# Patient Record
Sex: MALE | Race: WHITE | NOT HISPANIC OR LATINO | Employment: UNEMPLOYED | URBAN - METROPOLITAN AREA
[De-identification: names, ages, dates, MRNs, and addresses within clinical notes are randomized per-mention and may not be internally consistent; named-entity substitution may affect disease eponyms.]

---

## 2020-09-02 ENCOUNTER — HOSPITAL ENCOUNTER (EMERGENCY)
Facility: HOSPITAL | Age: 60
Discharge: HOME | End: 2020-09-02
Attending: FAMILY MEDICINE
Payer: MEDICAID

## 2020-09-02 VITALS
TEMPERATURE: 99 F | HEART RATE: 74 BPM | RESPIRATION RATE: 16 BRPM | HEIGHT: 68 IN | BODY MASS INDEX: 21.98 KG/M2 | DIASTOLIC BLOOD PRESSURE: 90 MMHG | OXYGEN SATURATION: 100 % | SYSTOLIC BLOOD PRESSURE: 166 MMHG | WEIGHT: 145 LBS

## 2020-09-02 DIAGNOSIS — K52.9 GASTROENTERITIS: Primary | ICD-10-CM

## 2020-09-02 LAB — SARS-COV-2 RDRP RESP QL NAA+PROBE: NEGATIVE

## 2020-09-02 PROCEDURE — 25000003 PHARM REV CODE 250: Performed by: FAMILY MEDICINE

## 2020-09-02 PROCEDURE — 96374 THER/PROPH/DIAG INJ IV PUSH: CPT

## 2020-09-02 PROCEDURE — U0002 COVID-19 LAB TEST NON-CDC: HCPCS

## 2020-09-02 PROCEDURE — 99284 EMERGENCY DEPT VISIT MOD MDM: CPT | Mod: 25

## 2020-09-02 PROCEDURE — 63600175 PHARM REV CODE 636 W HCPCS: Performed by: FAMILY MEDICINE

## 2020-09-02 RX ORDER — GLIPIZIDE 10 MG/1
10 TABLET ORAL
COMMUNITY

## 2020-09-02 RX ORDER — GABAPENTIN 300 MG/1
600 CAPSULE ORAL 3 TIMES DAILY
Status: DISCONTINUED | OUTPATIENT
Start: 2020-09-02 | End: 2020-09-02 | Stop reason: HOSPADM

## 2020-09-02 RX ORDER — ONDANSETRON 2 MG/ML
4 INJECTION INTRAMUSCULAR; INTRAVENOUS
Status: COMPLETED | OUTPATIENT
Start: 2020-09-02 | End: 2020-09-02

## 2020-09-02 RX ORDER — PROMETHAZINE HYDROCHLORIDE 25 MG/1
50 TABLET ORAL EVERY 6 HOURS PRN
Qty: 8 TABLET | Refills: 0 | Status: ON HOLD | OUTPATIENT
Start: 2020-09-02 | End: 2023-04-06 | Stop reason: HOSPADM

## 2020-09-02 RX ORDER — METFORMIN HYDROCHLORIDE 1000 MG/1
1000 TABLET ORAL 2 TIMES DAILY WITH MEALS
Status: ON HOLD | COMMUNITY
End: 2023-04-06 | Stop reason: SDUPTHER

## 2020-09-02 RX ORDER — GABAPENTIN 800 MG/1
800 TABLET ORAL 3 TIMES DAILY
Status: ON HOLD | COMMUNITY
End: 2023-04-06 | Stop reason: SDUPTHER

## 2020-09-02 RX ADMIN — ONDANSETRON HYDROCHLORIDE 4 MG: 2 SOLUTION INTRAMUSCULAR; INTRAVENOUS at 09:09

## 2020-09-02 RX ADMIN — SODIUM CHLORIDE 1000 ML: 9 INJECTION, SOLUTION INTRAVENOUS at 09:09

## 2020-09-04 NOTE — ED PROVIDER NOTES
Encounter Date: 9/2/2020       History     Chief Complaint   Patient presents with    Diarrhea     Onset of symptoms yesterday.    Abdominal Cramping     Complaint of upper abdominal cramping. States pain radiates throughout the left and right side. Describes pain as intermittent cramping sensation. Rates pain 4/10. Last oral intake yesterday 1500. LBM x 8 hours ago.    Chills    Nausea     Intermittent.    Emesis     Reports 0 episodes today; 3 episodes yesterday.     60-year-old male presents complaining upper abdominal cramping associated with nausea chills and some loose bowel movements no nicole diarrhea, patient has diabetes hypertension and neuropathy        Review of patient's allergies indicates:  No Known Allergies  Past Medical History:   Diagnosis Date    Diabetes mellitus     Hypertension     Neuropathy      History reviewed. No pertinent surgical history.  No family history on file.  Social History     Tobacco Use    Smoking status: Former Smoker    Smokeless tobacco: Current User   Substance Use Topics    Alcohol use: Not Currently    Drug use: Never     Review of Systems   Constitutional: Negative for fever.   HENT: Negative for sore throat.    Respiratory: Negative for shortness of breath.    Cardiovascular: Negative for chest pain.   Gastrointestinal: Positive for abdominal distention and abdominal pain. Negative for nausea.   Genitourinary: Negative for dysuria.   Musculoskeletal: Negative for back pain.   Skin: Negative for rash.   Neurological: Negative for weakness.   Hematological: Does not bruise/bleed easily.       Physical Exam     Initial Vitals [09/02/20 0814]   BP Pulse Resp Temp SpO2   (!) 166/90 74 16 98.5 °F (36.9 °C) 100 %      MAP       --         Physical Exam    Nursing note and vitals reviewed.  Constitutional: He appears well-developed and well-nourished. He is not diaphoretic. No distress.   HENT:   Head: Normocephalic and atraumatic.   Right Ear: External ear  normal.   Left Ear: External ear normal.   Nose: Nose normal.   Mouth/Throat: Oropharynx is clear and moist. No oropharyngeal exudate.   Eyes: EOM are normal.   Neck: Normal range of motion. Neck supple. No tracheal deviation present.   Cardiovascular: Normal rate and regular rhythm.   No murmur heard.  Pulmonary/Chest: Breath sounds normal. No stridor. No respiratory distress. He has no rales.   Abdominal: Soft. He exhibits no distension and no mass. There is no abdominal tenderness. There is no rebound.   Musculoskeletal: Normal range of motion. No edema.   Lymphadenopathy:     He has no cervical adenopathy.   Neurological: He is alert and oriented to person, place, and time. He has normal strength.   Skin: Skin is warm and dry. Capillary refill takes less than 2 seconds. No pallor.   Psychiatric: He has a normal mood and affect.         ED Course   Procedures  Labs Reviewed   SARS-COV-2 RNA AMPLIFICATION, QUAL          Imaging Results    None                                          Clinical Impression:       ICD-10-CM ICD-9-CM   1. Gastroenteritis  K52.9 558.9             ED Disposition Condition    Discharge Stable        ED Prescriptions     Medication Sig Dispense Start Date End Date Auth. Provider    promethazine (PHENERGAN) 25 MG tablet Take 2 tablets (50 mg total) by mouth every 6 (six) hours as needed for Nausea. 8 tablet 9/2/2020  Khanh Jorge MD        Follow-up Information    None                                    Khanh Jorge MD  09/04/20 7506

## 2023-03-31 ENCOUNTER — HOSPITAL ENCOUNTER (INPATIENT)
Facility: HOSPITAL | Age: 63
LOS: 5 days | Discharge: NURSING, REHAB OR LONG-TERM CARE | End: 2023-04-07
Attending: FAMILY MEDICINE | Admitting: HOSPITALIST
Payer: MEDICAID

## 2023-03-31 DIAGNOSIS — T14.90XA TRAUMA: ICD-10-CM

## 2023-03-31 DIAGNOSIS — I95.1 ORTHOSTATIC HYPOTENSION: ICD-10-CM

## 2023-03-31 DIAGNOSIS — R50.9 FEVER: ICD-10-CM

## 2023-03-31 DIAGNOSIS — L03.031 CELLULITIS AND ABSCESS OF TOE OF RIGHT FOOT: ICD-10-CM

## 2023-03-31 DIAGNOSIS — I21.4 NSTEMI (NON-ST ELEVATED MYOCARDIAL INFARCTION): ICD-10-CM

## 2023-03-31 DIAGNOSIS — L03.115 CELLULITIS OF RIGHT LOWER EXTREMITY: ICD-10-CM

## 2023-03-31 DIAGNOSIS — R09.02 HYPOXIA: ICD-10-CM

## 2023-03-31 DIAGNOSIS — L02.611 CELLULITIS AND ABSCESS OF TOE OF RIGHT FOOT: ICD-10-CM

## 2023-03-31 DIAGNOSIS — S99.191A OTHER PHYSEAL FRACTURE OF RIGHT METATARSAL, INITIAL ENCOUNTER FOR CLOSED FRACTURE: ICD-10-CM

## 2023-03-31 DIAGNOSIS — M86.371 CHRONIC MULTIFOCAL OSTEOMYELITIS OF RIGHT FOOT: Primary | ICD-10-CM

## 2023-03-31 DIAGNOSIS — S93.106A DISLOCATION OF PHALANX OF FOOT, UNSPECIFIED LATERALITY, INITIAL ENCOUNTER: ICD-10-CM

## 2023-03-31 DIAGNOSIS — N17.9 AKI (ACUTE KIDNEY INJURY): ICD-10-CM

## 2023-03-31 DIAGNOSIS — R06.02 SOB (SHORTNESS OF BREATH): ICD-10-CM

## 2023-03-31 DIAGNOSIS — S93.104A TOE DISLOCATION, RIGHT, INITIAL ENCOUNTER: ICD-10-CM

## 2023-03-31 DIAGNOSIS — E11.649 TYPE 2 DIABETES MELLITUS WITH HYPOGLYCEMIA WITHOUT COMA, WITHOUT LONG-TERM CURRENT USE OF INSULIN: ICD-10-CM

## 2023-03-31 DIAGNOSIS — R79.89 ELEVATED TROPONIN: ICD-10-CM

## 2023-03-31 LAB
ALBUMIN SERPL BCP-MCNC: 2.9 G/DL (ref 3.5–5.2)
ALP SERPL-CCNC: 63 U/L (ref 55–135)
ALT SERPL W/O P-5'-P-CCNC: 21 U/L (ref 10–44)
ANION GAP SERPL CALC-SCNC: 8 MMOL/L (ref 8–16)
AST SERPL-CCNC: 30 U/L (ref 10–40)
BASOPHILS # BLD AUTO: 0.03 K/UL (ref 0–0.2)
BASOPHILS NFR BLD: 0.3 % (ref 0–1.9)
BILIRUB SERPL-MCNC: 0.8 MG/DL (ref 0.1–1)
BNP SERPL-MCNC: 55 PG/ML (ref 0–99)
BUN SERPL-MCNC: 32 MG/DL (ref 8–23)
CALCIUM SERPL-MCNC: 9.2 MG/DL (ref 8.7–10.5)
CHLORIDE SERPL-SCNC: 101 MMOL/L (ref 95–110)
CK SERPL-CCNC: 184 U/L (ref 20–200)
CO2 SERPL-SCNC: 24 MMOL/L (ref 23–29)
CREAT SERPL-MCNC: 2.3 MG/DL (ref 0.5–1.4)
DIFFERENTIAL METHOD: ABNORMAL
EOSINOPHIL # BLD AUTO: 0 K/UL (ref 0–0.5)
EOSINOPHIL NFR BLD: 0.3 % (ref 0–8)
ERYTHROCYTE [DISTWIDTH] IN BLOOD BY AUTOMATED COUNT: 13.1 % (ref 11.5–14.5)
EST. GFR  (NO RACE VARIABLE): 30.9 ML/MIN/1.73 M^2
ESTIMATED AVG GLUCOSE: 140 MG/DL (ref 68–131)
GLUCOSE SERPL-MCNC: 151 MG/DL (ref 70–110)
HBA1C MFR BLD: 6.5 % (ref 4–5.6)
HCT VFR BLD AUTO: 31.5 % (ref 40–54)
HGB BLD-MCNC: 10.5 G/DL (ref 14–18)
IMM GRANULOCYTES # BLD AUTO: 0.04 K/UL (ref 0–0.04)
IMM GRANULOCYTES NFR BLD AUTO: 0.3 % (ref 0–0.5)
INFLUENZA A, MOLECULAR: NEGATIVE
INFLUENZA B, MOLECULAR: NEGATIVE
LACTATE SERPL-SCNC: 1.2 MMOL/L (ref 0.5–2.2)
LIPASE SERPL-CCNC: 26 U/L (ref 4–60)
LYMPHOCYTES # BLD AUTO: 0.8 K/UL (ref 1–4.8)
LYMPHOCYTES NFR BLD: 6.8 % (ref 18–48)
MCH RBC QN AUTO: 29.3 PG (ref 27–31)
MCHC RBC AUTO-ENTMCNC: 33.3 G/DL (ref 32–36)
MCV RBC AUTO: 88 FL (ref 82–98)
MONOCYTES # BLD AUTO: 1.1 K/UL (ref 0.3–1)
MONOCYTES NFR BLD: 9.3 % (ref 4–15)
NEUTROPHILS # BLD AUTO: 9.9 K/UL (ref 1.8–7.7)
NEUTROPHILS NFR BLD: 83 % (ref 38–73)
NRBC BLD-RTO: 0 /100 WBC
PLATELET # BLD AUTO: 266 K/UL (ref 150–450)
PMV BLD AUTO: 10.7 FL (ref 9.2–12.9)
POCT GLUCOSE: 189 MG/DL (ref 70–110)
POTASSIUM SERPL-SCNC: 4.4 MMOL/L (ref 3.5–5.1)
PROT SERPL-MCNC: 7.3 G/DL (ref 6–8.4)
RBC # BLD AUTO: 3.58 M/UL (ref 4.6–6.2)
SARS-COV-2 RDRP RESP QL NAA+PROBE: NEGATIVE
SODIUM SERPL-SCNC: 133 MMOL/L (ref 136–145)
SPECIMEN SOURCE: NORMAL
TROPONIN I SERPL DL<=0.01 NG/ML-MCNC: 0.12 NG/ML (ref 0–0.03)
TROPONIN I SERPL DL<=0.01 NG/ML-MCNC: 0.12 NG/ML (ref 0–0.03)
WBC # BLD AUTO: 11.89 K/UL (ref 3.9–12.7)

## 2023-03-31 PROCEDURE — 70450 CT HEAD/BRAIN W/O DYE: CPT | Mod: TC

## 2023-03-31 PROCEDURE — 87502 INFLUENZA DNA AMP PROBE: CPT | Performed by: FAMILY MEDICINE

## 2023-03-31 PROCEDURE — 82550 ASSAY OF CK (CPK): CPT | Performed by: FAMILY MEDICINE

## 2023-03-31 PROCEDURE — 84484 ASSAY OF TROPONIN QUANT: CPT | Performed by: FAMILY MEDICINE

## 2023-03-31 PROCEDURE — 73630 X-RAY EXAM OF FOOT: CPT | Mod: TC,RT

## 2023-03-31 PROCEDURE — G0378 HOSPITAL OBSERVATION PER HR: HCPCS

## 2023-03-31 PROCEDURE — U0002 COVID-19 LAB TEST NON-CDC: HCPCS | Performed by: FAMILY MEDICINE

## 2023-03-31 PROCEDURE — 25000003 PHARM REV CODE 250: Performed by: FAMILY MEDICINE

## 2023-03-31 PROCEDURE — 83605 ASSAY OF LACTIC ACID: CPT | Performed by: FAMILY MEDICINE

## 2023-03-31 PROCEDURE — 85025 COMPLETE CBC W/AUTO DIFF WBC: CPT | Performed by: FAMILY MEDICINE

## 2023-03-31 PROCEDURE — 96360 HYDRATION IV INFUSION INIT: CPT

## 2023-03-31 PROCEDURE — 73630 XR FOOT COMPLETE 3 VIEW RIGHT: ICD-10-PCS | Mod: 26,RT,, | Performed by: RADIOLOGY

## 2023-03-31 PROCEDURE — 71045 X-RAY EXAM CHEST 1 VIEW: CPT | Mod: TC

## 2023-03-31 PROCEDURE — 93010 EKG 12-LEAD: ICD-10-PCS | Mod: ,,, | Performed by: INTERNAL MEDICINE

## 2023-03-31 PROCEDURE — 93005 ELECTROCARDIOGRAM TRACING: CPT

## 2023-03-31 PROCEDURE — 83880 ASSAY OF NATRIURETIC PEPTIDE: CPT | Performed by: FAMILY MEDICINE

## 2023-03-31 PROCEDURE — 70450 CT HEAD WITHOUT CONTRAST: ICD-10-PCS | Mod: 26,,, | Performed by: RADIOLOGY

## 2023-03-31 PROCEDURE — 86803 HEPATITIS C AB TEST: CPT | Performed by: FAMILY MEDICINE

## 2023-03-31 PROCEDURE — 87389 HIV-1 AG W/HIV-1&-2 AB AG IA: CPT | Performed by: FAMILY MEDICINE

## 2023-03-31 PROCEDURE — 70450 CT HEAD/BRAIN W/O DYE: CPT | Mod: 26,,, | Performed by: RADIOLOGY

## 2023-03-31 PROCEDURE — 71045 X-RAY EXAM CHEST 1 VIEW: CPT | Mod: 26,,, | Performed by: RADIOLOGY

## 2023-03-31 PROCEDURE — 83036 HEMOGLOBIN GLYCOSYLATED A1C: CPT | Performed by: FAMILY MEDICINE

## 2023-03-31 PROCEDURE — 81000 URINALYSIS NONAUTO W/SCOPE: CPT | Performed by: FAMILY MEDICINE

## 2023-03-31 PROCEDURE — 83690 ASSAY OF LIPASE: CPT | Performed by: FAMILY MEDICINE

## 2023-03-31 PROCEDURE — 87040 BLOOD CULTURE FOR BACTERIA: CPT | Performed by: FAMILY MEDICINE

## 2023-03-31 PROCEDURE — 99285 EMERGENCY DEPT VISIT HI MDM: CPT | Mod: 25

## 2023-03-31 PROCEDURE — 80053 COMPREHEN METABOLIC PANEL: CPT | Performed by: FAMILY MEDICINE

## 2023-03-31 PROCEDURE — 82962 GLUCOSE BLOOD TEST: CPT

## 2023-03-31 PROCEDURE — 71045 XR CHEST AP PORTABLE: ICD-10-PCS | Mod: 26,,, | Performed by: RADIOLOGY

## 2023-03-31 PROCEDURE — 73630 X-RAY EXAM OF FOOT: CPT | Mod: 26,RT,, | Performed by: RADIOLOGY

## 2023-03-31 PROCEDURE — 93010 ELECTROCARDIOGRAM REPORT: CPT | Mod: ,,, | Performed by: INTERNAL MEDICINE

## 2023-03-31 PROCEDURE — 84484 ASSAY OF TROPONIN QUANT: CPT | Mod: 91 | Performed by: FAMILY MEDICINE

## 2023-03-31 RX ORDER — SODIUM CHLORIDE 9 MG/ML
INJECTION, SOLUTION INTRAVENOUS CONTINUOUS
Status: DISCONTINUED | OUTPATIENT
Start: 2023-03-31 | End: 2023-04-01

## 2023-03-31 RX ORDER — ASPIRIN 325 MG
325 TABLET ORAL
Status: COMPLETED | OUTPATIENT
Start: 2023-03-31 | End: 2023-03-31

## 2023-03-31 RX ORDER — ACETAMINOPHEN 325 MG/1
650 TABLET ORAL
Status: COMPLETED | OUTPATIENT
Start: 2023-03-31 | End: 2023-03-31

## 2023-03-31 RX ADMIN — SODIUM CHLORIDE 1000 ML: 9 INJECTION, SOLUTION INTRAVENOUS at 10:03

## 2023-03-31 RX ADMIN — ACETAMINOPHEN 650 MG: 325 TABLET ORAL at 08:03

## 2023-03-31 RX ADMIN — ASPIRIN 325 MG ORAL TABLET 325 MG: 325 PILL ORAL at 08:03

## 2023-03-31 RX ADMIN — SODIUM CHLORIDE: 9 INJECTION, SOLUTION INTRAVENOUS at 11:03

## 2023-03-31 RX ADMIN — SODIUM CHLORIDE 1000 ML: 9 INJECTION, SOLUTION INTRAVENOUS at 07:03

## 2023-03-31 NOTE — Clinical Note
Diagnosis: SOB (shortness of breath) [[id number]]   Future Attending Provider: LADARIUS GONZALEZ [[id number]]   Admitting Provider:: CHAYITO RODRIGUEZ [[id number]]   Special Needs:: No Special Needs [1]

## 2023-03-31 NOTE — ED PROVIDER NOTES
"Encounter Date: 3/31/2023       History     Chief Complaint   Patient presents with    Dizziness     Pt reports dizziness with associated falls/balance issues for "weeks".      62-year-old male presents the ED ambulatory brought brought in by his brother states that he is had problems with balance dizziness and falls 4 weeks but today he is fallen 3 or 4 times denies any headache he denies any nicole vertigo any pain in the ears tinnitus no nausea vomiting or diarrhea he has a known history of neuropathy a partial amputation of the right foot apparently secondary to diabetic neuropathy and diabetic lesion of the toe    Review of patient's allergies indicates:  No Known Allergies  Past Medical History:   Diagnosis Date    Diabetes mellitus     Hypertension     Neuropathy      History reviewed. No pertinent surgical history.  History reviewed. No pertinent family history.  Social History     Tobacco Use    Smoking status: Former    Smokeless tobacco: Current   Substance Use Topics    Alcohol use: Not Currently    Drug use: Never     Review of Systems   Constitutional:  Positive for fatigue. Negative for fever.   HENT:  Negative for sore throat.    Respiratory:  Negative for shortness of breath.    Cardiovascular:  Negative for chest pain.   Gastrointestinal:  Negative for nausea.   Genitourinary:  Negative for dysuria.   Musculoskeletal:  Negative for back pain.   Skin:  Negative for rash.   Neurological:  Positive for weakness.   Hematological:  Does not bruise/bleed easily.   Psychiatric/Behavioral:  Negative for agitation.      Physical Exam     Initial Vitals [03/31/23 1648]   BP Pulse Resp Temp SpO2   116/69 96 20 (!) 101.9 °F (38.8 °C) 96 %      MAP       --         Physical Exam    Nursing note and vitals reviewed.  Constitutional: He appears well-developed and well-nourished. He is not diaphoretic. No distress.   HENT:   Head: Normocephalic and atraumatic.   Nose: Nose normal.   Mouth/Throat: Oropharynx is " clear and moist. No oropharyngeal exudate.   Eyes: EOM are normal.   Neck: Neck supple. No tracheal deviation present.   Normal range of motion.  Cardiovascular:  Normal rate and regular rhythm.           No murmur heard.  Pulmonary/Chest: Breath sounds normal. No stridor. No respiratory distress. He has no rales.   Abdominal: Abdomen is soft. He exhibits no distension and no mass. There is no abdominal tenderness. There is no rebound.   Musculoskeletal:         General: Edema present. Normal range of motion.      Cervical back: Normal range of motion and neck supple.      Comments: Patient has some edema to the right ankle which she states is chronic he also has a amputation to the right great toe has slight sensation decreased to the right foot compared to the left but has palpable dorsalis pedis pulses     Lymphadenopathy:     He has no cervical adenopathy.   Neurological: He is alert and oriented to person, place, and time. He has normal strength.   Skin: Skin is warm and dry. Capillary refill takes less than 2 seconds. No pallor.   Psychiatric: He has a normal mood and affect.   I have discussed case with incoming ED physician Dr. Mendoza   who will finish care and disposition of patient.     ED Course   Procedures  Labs Reviewed   CBC W/ AUTO DIFFERENTIAL - Abnormal; Notable for the following components:       Result Value    RBC 3.58 (*)     Hemoglobin 10.5 (*)     Hematocrit 31.5 (*)     Gran # (ANC) 9.9 (*)     Lymph # 0.8 (*)     Mono # 1.1 (*)     Gran % 83.0 (*)     Lymph % 6.8 (*)     All other components within normal limits   COMPREHENSIVE METABOLIC PANEL - Abnormal; Notable for the following components:    Sodium 133 (*)     Glucose 151 (*)     BUN 32 (*)     Creatinine 2.3 (*)     Albumin 2.9 (*)     eGFR 30.9 (*)     All other components within normal limits   HEMOGLOBIN A1C - Abnormal; Notable for the following components:    Hemoglobin A1C 6.5 (*)     Estimated Avg Glucose 140 (*)     All other  components within normal limits   TROPONIN I - Abnormal; Notable for the following components:    Troponin I 0.119 (*)     All other components within normal limits   TROPONIN I - Abnormal; Notable for the following components:    Troponin I 0.125 (*)     All other components within normal limits   POCT GLUCOSE - Abnormal; Notable for the following components:    POCT Glucose 189 (*)     All other components within normal limits   INFLUENZA A & B BY MOLECULAR   CULTURE, BLOOD   CULTURE, BLOOD   LIPASE   B-TYPE NATRIURETIC PEPTIDE   SARS-COV-2 RNA AMPLIFICATION, QUAL    Narrative:     Is the patient symptomatic?->No   LACTIC ACID, PLASMA   TROPONIN I   CK   HIV 1 / 2 ANTIBODY   HEPATITIS C ANTIBODY   URINALYSIS, REFLEX TO URINE CULTURE     EKG Readings: (Independently Interpreted)   Initial Reading: No STEMI. Rhythm: Normal Sinus Rhythm. Heart Rate: 90. Ectopy: No Ectopy. Conduction: RBBB. ST Segments: Normal ST Segments. T Waves: Normal.     Imaging Results              X-Ray Foot Complete Right (In process)                      X-Ray Chest AP Portable (In process)                      CT Head Without Contrast (In process)                      Medications   0.9%  NaCl infusion (has no administration in time range)   sodium chloride 0.9% bolus 1,000 mL 1,000 mL (1,000 mLs Intravenous New Bag 3/31/23 2219)   sodium chloride 0.9% bolus 1,000 mL 1,000 mL (0 mLs Intravenous Stopped 3/31/23 2020)   aspirin tablet 325 mg (325 mg Oral Given 3/31/23 2008)   acetaminophen tablet 650 mg (650 mg Oral Given 3/31/23 2015)     Medical Decision Making:   ED Management:  I assume care of the patient who reports recent falls over the last week and several falls today.  Patient states that he was more stumbling with lack of control of his right foot.  His right foot does have neuropathy and does have foot drop.  Patient has previous great toe amputation in his right foot.  On exam, right foot is mildly swollen with ecchymotic 2nd toe.   Patient has pain on standing on right foot despite neuropathy.  X-rays were obtained and showed impacted 3rd metatarsal as well dislocated 2nd toe at the MTP joint.  Patient was also noted to have a fever on initial arrival.  This started resolve spontaneously.  Patient is given Tylenol.  Lactic acid was within normal limits.  Blood cultures were obtained.  Patient was unable to urinate and we are awaiting urinalysis currently.  I have given patient IV fluids.  We will continue give further IV fluids.  Obtain urine sample.  Patient was noted to have orthostatic hypotension with drop in systolic blood pressure greater than 30 on 2 different occasions.  Second occasion was after IV fluid bolus.  Patient denies any chest pain, palpitations or current dizziness when standing.  Additionally, patient had mildly elevated troponin I.  Follow-up troponins showed elevation as well.  Patient does have noted renal insufficiency.  EKG shows no STEMI.  There is no ST depressions.  There is noted T-wave inversions in V1, V3 and V4.    Patient was given aspirin.  Discussed case with hospitalist.  Patient will be admitted for observation due to renal insufficiency, orthostasis, likely dehydration, will trend out his cardiac markers.  We will attempt to have orthopedics evaluate patient while he is being observed.  Patient may likely need echocardiogram.  We will defer orders to hospitalist.                          Clinical Impression:   Final diagnoses:  [R06.02] SOB (shortness of breath)  [R50.9] Fever  [T14.90XA] Trauma  [T14.90XA] Trauma - eval for osteo also  [S99.191A] Other physeal fracture of right metatarsal, initial encounter for closed fracture (Primary)  [S93.104A] Toe dislocation, right, initial encounter  [I95.1] Orthostatic hypotension  [R77.8] Elevated troponin        ED Disposition Condition    Observation Stable                Collin Mendoza MD  03/31/23 1845

## 2023-04-01 PROBLEM — R79.89 ELEVATED TROPONIN: Status: ACTIVE | Noted: 2023-04-01

## 2023-04-01 PROBLEM — S93.106A DISLOCATED TOE: Status: ACTIVE | Noted: 2023-04-01

## 2023-04-01 PROBLEM — R29.6 MULTIPLE FALLS: Status: ACTIVE | Noted: 2023-04-01

## 2023-04-01 PROBLEM — I21.4 NSTEMI (NON-ST ELEVATED MYOCARDIAL INFARCTION): Status: ACTIVE | Noted: 2023-04-01

## 2023-04-01 PROBLEM — N17.9 AKI (ACUTE KIDNEY INJURY): Status: ACTIVE | Noted: 2023-04-01

## 2023-04-01 PROBLEM — I95.1 ORTHOSTATIC HYPOTENSION: Status: ACTIVE | Noted: 2023-04-01

## 2023-04-01 PROBLEM — L03.115 CELLULITIS OF RIGHT LOWER EXTREMITY: Status: ACTIVE | Noted: 2023-04-01

## 2023-04-01 PROBLEM — E11.649 TYPE 2 DIABETES MELLITUS WITH HYPOGLYCEMIA WITHOUT COMA, WITHOUT LONG-TERM CURRENT USE OF INSULIN: Status: ACTIVE | Noted: 2023-04-01

## 2023-04-01 PROBLEM — R50.9 FEVER: Status: ACTIVE | Noted: 2023-04-01

## 2023-04-01 LAB
AMORPH CRY URNS QL MICRO: ABNORMAL
ANION GAP SERPL CALC-SCNC: 10 MMOL/L (ref 8–16)
BACTERIA #/AREA URNS HPF: ABNORMAL /HPF
BASOPHILS # BLD AUTO: 0.03 K/UL (ref 0–0.2)
BASOPHILS NFR BLD: 0.3 % (ref 0–1.9)
BILIRUB UR QL STRIP: NEGATIVE
BUN SERPL-MCNC: 32 MG/DL (ref 8–23)
CALCIUM SERPL-MCNC: 8.1 MG/DL (ref 8.7–10.5)
CHLORIDE SERPL-SCNC: 109 MMOL/L (ref 95–110)
CK SERPL-CCNC: 128 U/L (ref 20–200)
CLARITY UR: CLEAR
CO2 SERPL-SCNC: 21 MMOL/L (ref 23–29)
COLOR UR: YELLOW
CREAT SERPL-MCNC: 2 MG/DL (ref 0.5–1.4)
CRP SERPL-MCNC: 198.6 MG/L (ref 0–8.2)
DIFFERENTIAL METHOD: ABNORMAL
EOSINOPHIL # BLD AUTO: 0.2 K/UL (ref 0–0.5)
EOSINOPHIL NFR BLD: 1.5 % (ref 0–8)
ERYTHROCYTE [DISTWIDTH] IN BLOOD BY AUTOMATED COUNT: 13.1 % (ref 11.5–14.5)
EST. GFR  (NO RACE VARIABLE): 36.6 ML/MIN/1.73 M^2
GLUCOSE SERPL-MCNC: 62 MG/DL (ref 70–110)
GLUCOSE UR QL STRIP: NEGATIVE
HCT VFR BLD AUTO: 26.8 % (ref 40–54)
HCV AB SERPL QL IA: NORMAL
HGB BLD-MCNC: 8.8 G/DL (ref 14–18)
HGB UR QL STRIP: NEGATIVE
HIV 1+2 AB+HIV1 P24 AG SERPL QL IA: NORMAL
HYALINE CASTS #/AREA URNS LPF: 2 /LPF
IMM GRANULOCYTES # BLD AUTO: 0.06 K/UL (ref 0–0.04)
IMM GRANULOCYTES NFR BLD AUTO: 0.5 % (ref 0–0.5)
KETONES UR QL STRIP: NEGATIVE
LEUKOCYTE ESTERASE UR QL STRIP: NEGATIVE
LYMPHOCYTES # BLD AUTO: 1.1 K/UL (ref 1–4.8)
LYMPHOCYTES NFR BLD: 9.6 % (ref 18–48)
MAGNESIUM SERPL-MCNC: 2 MG/DL (ref 1.6–2.6)
MCH RBC QN AUTO: 29.2 PG (ref 27–31)
MCHC RBC AUTO-ENTMCNC: 32.8 G/DL (ref 32–36)
MCV RBC AUTO: 89 FL (ref 82–98)
MICROSCOPIC COMMENT: ABNORMAL
MONOCYTES # BLD AUTO: 1.3 K/UL (ref 0.3–1)
MONOCYTES NFR BLD: 10.7 % (ref 4–15)
NEUTROPHILS # BLD AUTO: 9 K/UL (ref 1.8–7.7)
NEUTROPHILS NFR BLD: 77.4 % (ref 38–73)
NITRITE UR QL STRIP: NEGATIVE
NRBC BLD-RTO: 0 /100 WBC
PH UR STRIP: 6 [PH] (ref 5–8)
PHOSPHATE SERPL-MCNC: 3.7 MG/DL (ref 2.7–4.5)
PLATELET # BLD AUTO: 237 K/UL (ref 150–450)
PMV BLD AUTO: 11.1 FL (ref 9.2–12.9)
POCT GLUCOSE: 111 MG/DL (ref 70–110)
POCT GLUCOSE: 124 MG/DL (ref 70–110)
POCT GLUCOSE: 186 MG/DL (ref 70–110)
POCT GLUCOSE: 65 MG/DL (ref 70–110)
POTASSIUM SERPL-SCNC: 4 MMOL/L (ref 3.5–5.1)
PROT UR QL STRIP: ABNORMAL
RBC # BLD AUTO: 3.01 M/UL (ref 4.6–6.2)
RBC #/AREA URNS HPF: 0 /HPF (ref 0–4)
SODIUM SERPL-SCNC: 140 MMOL/L (ref 136–145)
SP GR UR STRIP: 1.01 (ref 1–1.03)
TROPONIN I SERPL DL<=0.01 NG/ML-MCNC: 0.06 NG/ML (ref 0–0.03)
TROPONIN I SERPL DL<=0.01 NG/ML-MCNC: 0.07 NG/ML (ref 0–0.03)
TROPONIN I SERPL DL<=0.01 NG/ML-MCNC: 0.1 NG/ML (ref 0–0.03)
URN SPEC COLLECT METH UR: ABNORMAL
UROBILINOGEN UR STRIP-ACNC: 1 EU/DL
WBC # BLD AUTO: 11.65 K/UL (ref 3.9–12.7)
WBC #/AREA URNS HPF: 0 /HPF (ref 0–5)

## 2023-04-01 PROCEDURE — 96372 THER/PROPH/DIAG INJ SC/IM: CPT | Performed by: HOSPITALIST

## 2023-04-01 PROCEDURE — 83735 ASSAY OF MAGNESIUM: CPT | Performed by: HOSPITALIST

## 2023-04-01 PROCEDURE — 85025 COMPLETE CBC W/AUTO DIFF WBC: CPT | Performed by: HOSPITALIST

## 2023-04-01 PROCEDURE — 99499 UNLISTED E&M SERVICE: CPT | Mod: ,,, | Performed by: STUDENT IN AN ORGANIZED HEALTH CARE EDUCATION/TRAINING PROGRAM

## 2023-04-01 PROCEDURE — 25000003 PHARM REV CODE 250: Performed by: FAMILY MEDICINE

## 2023-04-01 PROCEDURE — S0030 INJECTION, METRONIDAZOLE: HCPCS | Performed by: STUDENT IN AN ORGANIZED HEALTH CARE EDUCATION/TRAINING PROGRAM

## 2023-04-01 PROCEDURE — 99223 PR INITIAL HOSPITAL CARE,LEVL III: ICD-10-PCS | Mod: GT,,, | Performed by: HOSPITALIST

## 2023-04-01 PROCEDURE — 99223 1ST HOSP IP/OBS HIGH 75: CPT | Mod: GT,,, | Performed by: HOSPITALIST

## 2023-04-01 PROCEDURE — 25000003 PHARM REV CODE 250

## 2023-04-01 PROCEDURE — G0378 HOSPITAL OBSERVATION PER HR: HCPCS

## 2023-04-01 PROCEDURE — 82550 ASSAY OF CK (CPK): CPT | Performed by: HOSPITALIST

## 2023-04-01 PROCEDURE — 63600175 PHARM REV CODE 636 W HCPCS: Performed by: STUDENT IN AN ORGANIZED HEALTH CARE EDUCATION/TRAINING PROGRAM

## 2023-04-01 PROCEDURE — 86140 C-REACTIVE PROTEIN: CPT | Performed by: HOSPITALIST

## 2023-04-01 PROCEDURE — 25000003 PHARM REV CODE 250: Performed by: STUDENT IN AN ORGANIZED HEALTH CARE EDUCATION/TRAINING PROGRAM

## 2023-04-01 PROCEDURE — 63600175 PHARM REV CODE 636 W HCPCS: Performed by: HOSPITALIST

## 2023-04-01 PROCEDURE — 84100 ASSAY OF PHOSPHORUS: CPT | Performed by: HOSPITALIST

## 2023-04-01 PROCEDURE — 25000003 PHARM REV CODE 250: Performed by: HOSPITALIST

## 2023-04-01 PROCEDURE — 80048 BASIC METABOLIC PNL TOTAL CA: CPT | Performed by: HOSPITALIST

## 2023-04-01 PROCEDURE — 84484 ASSAY OF TROPONIN QUANT: CPT | Performed by: HOSPITALIST

## 2023-04-01 PROCEDURE — 99499 NO LOS: ICD-10-PCS | Mod: ,,, | Performed by: STUDENT IN AN ORGANIZED HEALTH CARE EDUCATION/TRAINING PROGRAM

## 2023-04-01 RX ORDER — GABAPENTIN 100 MG/1
CAPSULE ORAL
Status: COMPLETED
Start: 2023-04-01 | End: 2023-04-01

## 2023-04-01 RX ORDER — GABAPENTIN 300 MG/1
CAPSULE ORAL
Status: DISPENSED
Start: 2023-04-01 | End: 2023-04-01

## 2023-04-01 RX ORDER — METRONIDAZOLE 500 MG/100ML
500 INJECTION, SOLUTION INTRAVENOUS
Status: DISCONTINUED | OUTPATIENT
Start: 2023-04-01 | End: 2023-04-02

## 2023-04-01 RX ORDER — CLINDAMYCIN PHOSPHATE 900 MG/50ML
900 INJECTION, SOLUTION INTRAVENOUS
Status: DISCONTINUED | OUTPATIENT
Start: 2023-04-01 | End: 2023-04-01

## 2023-04-01 RX ORDER — SODIUM CHLORIDE 9 MG/ML
INJECTION, SOLUTION INTRAVENOUS CONTINUOUS
Status: ACTIVE | OUTPATIENT
Start: 2023-04-01 | End: 2023-04-02

## 2023-04-01 RX ORDER — DEXTROSE 40 %
30 GEL (GRAM) ORAL
Status: DISCONTINUED | OUTPATIENT
Start: 2023-04-01 | End: 2023-04-07 | Stop reason: HOSPADM

## 2023-04-01 RX ORDER — SODIUM CHLORIDE 0.9 % (FLUSH) 0.9 %
10 SYRINGE (ML) INJECTION
Status: DISCONTINUED | OUTPATIENT
Start: 2023-04-01 | End: 2023-04-07 | Stop reason: HOSPADM

## 2023-04-01 RX ORDER — INSULIN ASPART 100 [IU]/ML
0-5 INJECTION, SOLUTION INTRAVENOUS; SUBCUTANEOUS
Status: DISCONTINUED | OUTPATIENT
Start: 2023-04-01 | End: 2023-04-07 | Stop reason: HOSPADM

## 2023-04-01 RX ORDER — NALOXONE HCL 0.4 MG/ML
0.02 VIAL (ML) INJECTION
Status: DISCONTINUED | OUTPATIENT
Start: 2023-04-01 | End: 2023-04-07 | Stop reason: HOSPADM

## 2023-04-01 RX ORDER — GABAPENTIN 100 MG/1
400 CAPSULE ORAL 3 TIMES DAILY
Status: DISCONTINUED | OUTPATIENT
Start: 2023-04-01 | End: 2023-04-01

## 2023-04-01 RX ORDER — HEPARIN SODIUM 5000 [USP'U]/ML
5000 INJECTION, SOLUTION INTRAVENOUS; SUBCUTANEOUS EVERY 8 HOURS
Status: DISCONTINUED | OUTPATIENT
Start: 2023-04-01 | End: 2023-04-02

## 2023-04-01 RX ORDER — GLUCAGON 1 MG
1 KIT INJECTION
Status: DISCONTINUED | OUTPATIENT
Start: 2023-04-01 | End: 2023-04-01

## 2023-04-01 RX ORDER — CLINDAMYCIN PHOSPHATE 900 MG/50ML
900 INJECTION, SOLUTION INTRAVENOUS
Status: DISCONTINUED | OUTPATIENT
Start: 2023-04-01 | End: 2023-04-02

## 2023-04-01 RX ORDER — DEXTROSE 40 %
15 GEL (GRAM) ORAL
Status: DISCONTINUED | OUTPATIENT
Start: 2023-04-01 | End: 2023-04-07 | Stop reason: HOSPADM

## 2023-04-01 RX ADMIN — GABAPENTIN 400 MG: 100 CAPSULE ORAL at 08:04

## 2023-04-01 RX ADMIN — VANCOMYCIN HYDROCHLORIDE 750 MG: 750 INJECTION, POWDER, LYOPHILIZED, FOR SOLUTION INTRAVENOUS at 05:04

## 2023-04-01 RX ADMIN — GABAPENTIN 400 MG: 100 CAPSULE ORAL at 03:04

## 2023-04-01 RX ADMIN — SODIUM CHLORIDE: 9 INJECTION, SOLUTION INTRAVENOUS at 06:04

## 2023-04-01 RX ADMIN — GABAPENTIN 400 MG: 100 CAPSULE ORAL at 02:04

## 2023-04-01 RX ADMIN — HEPARIN SODIUM 5000 UNITS: 5000 INJECTION, SOLUTION INTRAVENOUS; SUBCUTANEOUS at 02:04

## 2023-04-01 RX ADMIN — HEPARIN SODIUM 5000 UNITS: 5000 INJECTION, SOLUTION INTRAVENOUS; SUBCUTANEOUS at 05:04

## 2023-04-01 RX ADMIN — CEFEPIME 2 G: 2 INJECTION, POWDER, FOR SOLUTION INTRAMUSCULAR; INTRAVENOUS at 09:04

## 2023-04-01 RX ADMIN — SODIUM CHLORIDE: 9 INJECTION, SOLUTION INTRAVENOUS at 05:04

## 2023-04-01 RX ADMIN — GABAPENTIN 400 MG: 100 CAPSULE ORAL at 07:04

## 2023-04-01 RX ADMIN — METRONIDAZOLE 500 MG: 5 INJECTION, SOLUTION INTRAVENOUS at 06:04

## 2023-04-01 RX ADMIN — CLINDAMYCIN IN 5 PERCENT DEXTROSE 900 MG: 18 INJECTION, SOLUTION INTRAVENOUS at 07:04

## 2023-04-01 RX ADMIN — SODIUM CHLORIDE: 9 INJECTION, SOLUTION INTRAVENOUS at 12:04

## 2023-04-01 NOTE — HOSPITAL COURSE
Admitted to hospital for orthostatic hypotension, elevated troponin, and multiple falls several times a day as well.  He had a mildly elvated troponin which down trended, TTE ordered and pending. On further questioning, patient is adamant he is falling because he is unstable on his feet, is not passing out, and denies chest pain.  He has history of diabetic foot infection s/p amputation of right great toe with diabetic neuropathy.  His 2nd toe found to be ecchymotic, swollen, warm to touch but not painful.  Had fever noted in ED visit, BCx drawn, lactate wnl, antibiotics deferred on admission. Xrays showed 2nd digit dislocation as well as soft tissue swelling and questionable gas concerning for possible cellulitis or early necrotizing fasciitis.  Orthopedic surgery consulted in am, recommended IV abx, MRI, and vascular surgery evaluation for possible deep space infection and surgical debridement/amputation.  Obtained inflammatory markers, which were significantly elevated.  Started on broad spectrum antibiotics, CT foot ordered to evaluate deep space infection, which showed abscess and possible early osteomyelitis, but no evidence of gas gangrene or necrotizing infection.  Continued to spike fevers while on antibiotics. Podiatry consulted for surgical evaluation and infection source control, and plan to taken to OR for transmetatarsal amputation of right foot. Cultures taken. Eventually grew staph sensitive to moxifloxacin. Podiatry recommended patient being sent out on this antibiotic for 4 weeks. Patient with evidence of volume overload with acute on chronic HFpEF exacerbation causing acute hypoxic respiratory failure. This has resolved with IV lasix with cardiology guidance. Blood pressure medications have been uptitrated while patient has been admitted. Will require further uptitration in outpatient setting. Patient is cleared medically for discharge. Needs repeat BMP, Mg in 1 week to assess electrolytes and  renal function. Results should be faxed to PCP and to cardiologist if able.

## 2023-04-01 NOTE — HPI
The patient is a 63 y/o male with PMH of T2DM, neuropathy, and HTN who presented with dizziness and falls. He states falling about 8 times today. He reports dizziness and balance issues over the past 4 weeks. On presentation to the ER he was found to be orthostatic. Patient also found to have 3rd metatarsal fracture and dislocation of the 2nd toe. The second toe also appears to be ecchymotic and dusky. He reports that the toe had been like this for several months. He also had a temp of 101 in the ER.

## 2023-04-01 NOTE — NURSING
Report received.  Assessment done.  VSS.  Awake and oriented.  Respirations even and unlabored.  No complaints at this time.  A.M.  assessment unchanged.

## 2023-04-01 NOTE — PROGRESS NOTES
[facility] Medicine  Progress Note    Patient Name: Juan Pablo Chang  MRN: [id number]  Patient Class: OP- Observation   Admission Date: 3/31/2023  Length of Stay: 0 days  Attending Physician: Lary Cabrera MD  Primary Care Provider: EDNA Butler        Subjective:     Principal Problem:Cellulitis of right lower extremity        HPI:  The patient is a 61 y/o male with PMH of T2DM, neuropathy, and HTN who presented with dizziness and falls. He states falling about 8 times today. He reports dizziness and balance issues over the past 4 weeks. On presentation to the ER he was found to be orthostatic. Patient also found to have 3rd metatarsal fracture and dislocation of the 2nd toe. The second toe also appears to be ecchymotic and dusky. He reports that the toe had been like this for several months. He also had a temp of 101 in the ER.       Overview/Hospital Course:  Admitted to hospital for orthostatic hypotension, elevated troponin, and multiple falls several times a day as well.  He had a mildly elvated troponin which down trended, TTE ordered and pending. On further questioning, patient is adamant he is falling because he is unstable on his feet, is not passing out, and denies chest pain.  He has history of diabetic foot infection s/p amputation of right great toe with diabetic neuropathy.  His 2nd toe found to be ecchymotic, swollen, warm to touch but not painful.  Had fever noted in ED visit, BCx drawn, lactate wnl, but antibiotics deferred. Xrays showed 2nd digit dislocation as well as soft tissue swelling and questionable gas concerning for possible cellulitis or early necrotizing fasciitis.  Orthopedic surgery consulted in am, recommended IV abx, MRI, and vascular surgery evaluation for possible deep space infection and surgical debridement/amputation.  Obtained inflammatory markers, which were significantly elevated.  Started on broad spectrum antibiotics, CT and MRI foot ordered  to evaluate deep space infection, US arterial ordered for vascular status but has easily palpable pulses.  Podiatry consulted for surgical evaluation.      Interval History: This morning denies chest pain, worsening foot pain or swelling.  Actually asking if he can be discharged. BP normal, no subjective ortho stasis this morning.  Episode of asymptomatic hypoglycemia this am that corrected with oral sugar. Did spike another fever this afternoon.    Review of Systems  Objective:     Vital Signs (Most Recent):  Temp: (!) 100.6 °F (38.1 °C) (04/01/23 1645)  Pulse: 85 (04/01/23 1645)  Resp: 18 (04/01/23 1645)  BP: (!) 151/67 (04/01/23 1645)  SpO2: 96 % (04/01/23 1645)   Vital Signs (24h Range):  Temp:  [96.9 °F (36.1 °C)-100.6 °F (38.1 °C)] 100.6 °F (38.1 °C)  Pulse:  [64-90] 85  Resp:  [12-20] 18  SpO2:  [94 %-99 %] 96 %  BP: ()/(44-89) 151/67     Weight: 69 kg (152 lb 1.9 oz)  Body mass index is 23.13 kg/m².    Intake/Output Summary (Last 24 hours) at 4/1/2023 1716  Last data filed at 4/1/2023 1157  Gross per 24 hour   Intake 60 ml   Output 1250 ml   Net -1190 ml      Physical Exam  Vitals reviewed  General: NAD, Well developed, Well Nourished  Head: NC/AT  Eyes: EOMI, JAY  Cardiovascular: Pulses intact distally, Regular Rate and rhythm  Pulmonary: Normal Respiratory Rate, No respiratory distress  Gi: Soft, Non-tender  Extremities: Warm, surgical stump from RGT amputation well healed, right second toe ecchymotic, swollen, erythematous, no crepitus or bullae on palpation  Skin: Warm, dry  Neuro: Alert, Oriented x3, No focal Deficit  Psych: Appropriate mood and affect    Significant Labs: All pertinent labs within the past 24 hours have been reviewed.    Significant Imaging: I have reviewed all pertinent imaging results/findings within the past 24 hours.      Assessment/Plan:      * Cellulitis of right lower extremity  Time course of swollen foot/toe uncertain, it is not acutely worsening since admission   BCx  ordered and pending  Broad spectrum abx (vanc, cefepime, flagyl renally dosed)  Xrays with soft tissue swelling, questionable gas  CT, MRI ordered to evaluate deep space infection (nec fas, osteo)  Podiatry consults for surgical evaluation, will eval in am, appreciate assistance  LINREC score 6 pts, intermediate risk, added clinda pending CT      Multiple falls  Orthostasis improved.   Suspect mechanical component due to neuropathy and RGT amputation   PT/OT eval on Monday       Type 2 diabetes mellitus with hypoglycemia without coma, without long-term current use of insulin  Patient's FSGs are controlled on current medication regimen.  Last A1c reviewed-   Lab Results   Component Value Date    HGBA1C 6.5 (H) 03/31/2023     Most recent fingerstick glucose reviewed-   Recent Labs   Lab 04/01/23  0730 04/01/23  1114 04/01/23  1623   POCTGLUCOSE 65* 124* 111*     Current correctional scale  Low  Maintain anti-hyperglycemic dose as follows-   Antihyperglycemics (From admission, onward)    Start     Stop Route Frequency Ordered    04/01/23 1214  insulin aspart U-100 pen 0-5 Units         -- SubQ Before meals & nightly PRN 04/01/23 1115        Hold Oral hypoglycemics while patient is in the hospital.    Episode of asymptomatic hypoglycemia this am that corrected with oral sugar    Dislocated toe  Orthopedics consult, appreciate recs  NWB RLE for now.       Orthostatic hypotension  Possibly in setting of sepsis/infection   Now improved after IVF  Hold lisinopril-HCTZ, restart as able with STERLING  Telemetry     Some T wave inversions noted on EKG  Troponin peaked, asymptomatic   Low suspicion for STEMI/NSTEMI  Given ASA  2D echo     STERLING (acute kidney injury)  Lab Results   Component Value Date    CREATININE 2.0 (H) 04/01/2023     Sr Cr elevated, unknown baseline (suspect some CKD), improved with IVF  Daily CMP  Avoid nephrotoxins.   Renally dose all medications   Monitor events that may lead to decreased renal perfusion  (hypovolemia, hypotension, sepsis).   Monitor urine output (goal 0.5 mL/kg/hr) to assure that no obstruction precipitates worsening in GFR.          Fever  See cellulitis management      Elevated troponin  See orthostatic hypotension      VTE Risk Mitigation (From admission, onward)         Ordered     heparin (porcine) injection 5,000 Units  Every 8 hours         04/01/23 0025                Discharge Planning   MARION:      Code Status: Full Code   Is the patient medically ready for discharge?:     Reason for patient still in hospital (select all that apply): Treatment, Imaging, Consult recommendations and PT / OT recommendations             Lary Cabrera MD  Department of Hospital Medicine   [facility]

## 2023-04-01 NOTE — CONSULTS
Past Medical History:   Diagnosis Date    Diabetes mellitus     Hypertension     Neuropathy        History reviewed. No pertinent surgical history.      Current Facility-Administered Medications:     0.9%  NaCl infusion, , Intravenous, Continuous, Collin Mendoza MD, Last Rate: 150 mL/hr at 04/01/23 0606, New Bag at 04/01/23 0606    dextrose 10% bolus 125 mL 125 mL, 12.5 g, Intravenous, PRN, Sindy Anderson MD    dextrose 10% bolus 250 mL 250 mL, 25 g, Intravenous, PRN, Sindy Anderson MD    dextrose 40 % gel 15,000 mg, 15 g, Oral, PRN, Sindy Anderson MD    dextrose 40 % gel 30,000 mg, 30 g, Oral, PRN, Sindy Anderson MD    gabapentin (NEURONTIN) 300 MG capsule, , , ,     gabapentin capsule 400 mg, 400 mg, Oral, TID, Sindy Anderson MD, 400 mg at 04/01/23 0857    glucagon (human recombinant) injection 1 mg, 1 mg, Intramuscular, PRN, Sindy Anderson MD    heparin (porcine) injection 5,000 Units, 5,000 Units, Subcutaneous, Q8H, Sindy Anderson MD, 5,000 Units at 04/01/23 0556    naloxone 0.4 mg/mL injection 0.02 mg, 0.02 mg, Intravenous, PRN, Sindy Anderson MD    sodium chloride 0.9% flush 10 mL, 10 mL, Intravenous, PRN, Sindy Anderson MD    Allergies as of 03/31/2023    (No Known Allergies)       History reviewed. No pertinent family history.    Social History     Socioeconomic History    Marital status: Single   Tobacco Use    Smoking status: Former    Smokeless tobacco: Current   Substance and Sexual Activity    Alcohol use: Not Currently    Drug use: Never         CC:  Chronic right foot pain    HPI:  62-year-old male with history of diabetic neuropathy status post amputation of his right great toe he states approximately 2 years ago performed in [place].  He states it is since that time he is had pain swelling in his right foot.  Her pain is not really so much an issue as he has very little sensation in his foot he states that he is takes care of his foot was daily bathing and lotions and creams.   He is had no drainage from his foot but he is had swelling and redness for many months.  There has been no trauma recently they can appreciate.    Review of Systems   Constitution: Negative for chills and fever.   HENT: Negative for headaches and blurry vision.   Cardiovascular: Negative for chest pain, irregular heartbeat, leg swelling and palpitations.   Respiratory: Negative for cough and shortness of breath.   Endocrine: Negative for polyuria.   Hematologic/Lymphatic: Negative for bleeding problem. Does not bruise/bleed easily.   Skin: Negative for poor wound healing and rash.   Musculoskeletal: Negative for joint pain. Negative for arthritis, joint swelling, muscle weakness and myalgias.   Gastrointestinal: Negative for abdominal pain, heartburn, melena, nausea and vomiting.   Genitourinary: Negative for bladder incontinence and dysuria.   Neurological: Negative for numbness.   Psychiatric/Behavioral: Negative for depression. The patient is not nervous/anxious.     PE:  Temp:  [96.9 °F (36.1 °C)-101.9 °F (38.8 °C)] 98 °F (36.7 °C)  Pulse:  [64-96] 83  Resp:  [12-20] 18  SpO2:  [94 %-99 %] 94 %  BP: ()/(44-89) 139/64    Constitutional:   Patient is alert  and oriented in no acute distress  HEENT:  normocephalic atraumatic; PERRL EOMI  Neck:  Supple without adenopathy  Cardiovascular:  Normal rate and rhythm  Pulmonary:  Normal respiratory effort normal chest wall expansion  Abdominal:  Nonprotuberant nondistended  Musculoskeletal:  Patient has obvious absence of his great toe.  There swelling and faint erythema of the 2nd toe callus on the plantar aspect of his foot overlying the 2nd MTP joint there is no fluctuance or drainage there is no lymphangitis or proximal adenopathy.  Neurological:  No focal defect; cranial nerves 2-12 grossly intact  Psychiatric/behavioral:  Mood and behavior normal    Xrays:  Radiographs consistent with absence of the great toe probable chronic dislocation of his 2nd MTP joint  chronic degenerative changes of the 3rd metatarsal mild degenerative changes generally within the foot.  No acute abnormalities noted.    Labs:    Recent Results (from the past 24 hour(s))   POCT glucose    Collection Time: 03/31/23  4:51 PM   Result Value Ref Range    POCT Glucose 189 (H) 70 - 110 mg/dL   Complete Blood Count (CBC)    Collection Time: 03/31/23  5:50 PM   Result Value Ref Range    WBC 11.89 3.90 - 12.70 K/uL    RBC 3.58 (L) 4.60 - 6.20 M/uL    Hemoglobin 10.5 (L) 14.0 - 18.0 g/dL    Hematocrit 31.5 (L) 40.0 - 54.0 %    MCV 88 82 - 98 fL    MCH 29.3 27.0 - 31.0 pg    MCHC 33.3 32.0 - 36.0 g/dL    RDW 13.1 11.5 - 14.5 %    Platelets 266 150 - 450 K/uL    MPV 10.7 9.2 - 12.9 fL    Immature Granulocytes 0.3 0.0 - 0.5 %    Gran # (ANC) 9.9 (H) 1.8 - 7.7 K/uL    Immature Grans (Abs) 0.04 0.00 - 0.04 K/uL    Lymph # 0.8 (L) 1.0 - 4.8 K/uL    Mono # 1.1 (H) 0.3 - 1.0 K/uL    Eos # 0.0 0.0 - 0.5 K/uL    Baso # 0.03 0.00 - 0.20 K/uL    nRBC 0 0 /100 WBC    Gran % 83.0 (H) 38.0 - 73.0 %    Lymph % 6.8 (L) 18.0 - 48.0 %    Mono % 9.3 4.0 - 15.0 %    Eosinophil % 0.3 0.0 - 8.0 %    Basophil % 0.3 0.0 - 1.9 %    Differential Method Automated    Comprehensive Metabolic Panel (CMP)    Collection Time: 03/31/23  5:50 PM   Result Value Ref Range    Sodium 133 (L) 136 - 145 mmol/L    Potassium 4.4 3.5 - 5.1 mmol/L    Chloride 101 95 - 110 mmol/L    CO2 24 23 - 29 mmol/L    Glucose 151 (H) 70 - 110 mg/dL    BUN 32 (H) 8 - 23 mg/dL    Creatinine 2.3 (H) 0.5 - 1.4 mg/dL    Calcium 9.2 8.7 - 10.5 mg/dL    Total Protein 7.3 6.0 - 8.4 g/dL    Albumin 2.9 (L) 3.5 - 5.2 g/dL    Total Bilirubin 0.8 0.1 - 1.0 mg/dL    Alkaline Phosphatase 63 55 - 135 U/L    AST 30 10 - 40 U/L    ALT 21 10 - 44 U/L    Anion Gap 8 8 - 16 mmol/L    eGFR 30.9 (A) >60 mL/min/1.73 m^2   Lipase    Collection Time: 03/31/23  5:50 PM   Result Value Ref Range    Lipase 26 4 - 60 U/L   Brain natriuretic peptide    Collection Time: 03/31/23  5:50 PM    Result Value Ref Range    BNP 55 0 - 99 pg/mL   Hemoglobin A1c    Collection Time: 03/31/23  5:50 PM   Result Value Ref Range    Hemoglobin A1C 6.5 (H) 4.0 - 5.6 %    Estimated Avg Glucose 140 (H) 68 - 131 mg/dL   Troponin I    Collection Time: 03/31/23  5:50 PM   Result Value Ref Range    Troponin I 0.119 (H) 0.000 - 0.026 ng/mL   Influenza A & B by Molecular    Collection Time: 03/31/23  7:21 PM    Specimen: Nasopharyngeal Swab   Result Value Ref Range    Influenza A, Molecular Negative Negative    Influenza B, Molecular Negative Negative    Flu A & B Source Nasal Swab    COVID-19 Rapid Screening    Collection Time: 03/31/23  7:21 PM   Result Value Ref Range    SARS-CoV-2 RNA, Amplification, Qual Negative Negative   Lactic acid, plasma    Collection Time: 03/31/23  7:25 PM   Result Value Ref Range    Lactate (Lactic Acid) 1.2 0.5 - 2.2 mmol/L   CPK    Collection Time: 03/31/23  8:09 PM   Result Value Ref Range     20 - 200 U/L   Troponin I    Collection Time: 03/31/23  8:09 PM   Result Value Ref Range    Troponin I 0.125 (H) 0.000 - 0.026 ng/mL   Urinalysis, Reflex to Urine Culture Urine, Clean Catch    Collection Time: 03/31/23 11:38 PM    Specimen: Urine, Clean Catch   Result Value Ref Range    Specimen UA Urine, Unspecified     Color, UA Yellow Yellow, Straw, Dalila    Appearance, UA Clear Clear    pH, UA 6.0 5.0 - 8.0    Specific Gravity, UA 1.010 1.005 - 1.030    Protein, UA 1+ (A) Negative    Glucose, UA Negative Negative    Ketones, UA Negative Negative    Bilirubin (UA) Negative Negative    Occult Blood UA Negative Negative    Nitrite, UA Negative Negative    Urobilinogen, UA 1.0 Negative EU/dL    Leukocytes, UA Negative Negative   Urinalysis Microscopic    Collection Time: 03/31/23 11:38 PM   Result Value Ref Range    RBC, UA 0 0 - 4 /hpf    WBC, UA 0 0 - 5 /hpf    Bacteria Occasional None-Occ /hpf    Hyaline Casts, UA 2 (A) 0-1/lpf /lpf    Amorphous, UA Few None-Moderate    Microscopic Comment  SEE COMMENT    Troponin I    Collection Time: 04/01/23  2:45 AM   Result Value Ref Range    Troponin I 0.096 (H) 0.000 - 0.026 ng/mL   CBC with Automated Differential    Collection Time: 04/01/23  4:35 AM   Result Value Ref Range    WBC 11.65 3.90 - 12.70 K/uL    RBC 3.01 (L) 4.60 - 6.20 M/uL    Hemoglobin 8.8 (L) 14.0 - 18.0 g/dL    Hematocrit 26.8 (L) 40.0 - 54.0 %    MCV 89 82 - 98 fL    MCH 29.2 27.0 - 31.0 pg    MCHC 32.8 32.0 - 36.0 g/dL    RDW 13.1 11.5 - 14.5 %    Platelets 237 150 - 450 K/uL    MPV 11.1 9.2 - 12.9 fL    Immature Granulocytes 0.5 0.0 - 0.5 %    Gran # (ANC) 9.0 (H) 1.8 - 7.7 K/uL    Immature Grans (Abs) 0.06 (H) 0.00 - 0.04 K/uL    Lymph # 1.1 1.0 - 4.8 K/uL    Mono # 1.3 (H) 0.3 - 1.0 K/uL    Eos # 0.2 0.0 - 0.5 K/uL    Baso # 0.03 0.00 - 0.20 K/uL    nRBC 0 0 /100 WBC    Gran % 77.4 (H) 38.0 - 73.0 %    Lymph % 9.6 (L) 18.0 - 48.0 %    Mono % 10.7 4.0 - 15.0 %    Eosinophil % 1.5 0.0 - 8.0 %    Basophil % 0.3 0.0 - 1.9 %    Differential Method Automated    Basic Metabolic Panel (BMP)    Collection Time: 04/01/23  4:35 AM   Result Value Ref Range    Sodium 140 136 - 145 mmol/L    Potassium 4.0 3.5 - 5.1 mmol/L    Chloride 109 95 - 110 mmol/L    CO2 21 (L) 23 - 29 mmol/L    Glucose 62 (L) 70 - 110 mg/dL    BUN 32 (H) 8 - 23 mg/dL    Creatinine 2.0 (H) 0.5 - 1.4 mg/dL    Calcium 8.1 (L) 8.7 - 10.5 mg/dL    Anion Gap 10 8 - 16 mmol/L    eGFR 36.6 (A) >60 mL/min/1.73 m^2   Magnesium    Collection Time: 04/01/23  4:35 AM   Result Value Ref Range    Magnesium 2.0 1.6 - 2.6 mg/dL   Phosphorus    Collection Time: 04/01/23  4:35 AM   Result Value Ref Range    Phosphorus 3.7 2.7 - 4.5 mg/dL   POCT glucose    Collection Time: 04/01/23  7:30 AM   Result Value Ref Range    POCT Glucose 65 (L) 70 - 110 mg/dL       A:  Chronic right foot pain/diabetic neuropathy      P:  I have discussed medical condition treatment options with him at length.  I have suggested IV antibiotics close observation elevation of  extremity and an MRI of his right foot.  He will likely need an evaluation by a foot and ankle vascular specialist for consideration for more definitive treatment of a likely chronic infection in his foot to determine whether suppressive antibiotics is reasonable for him whether this might require amputation to help determine the amputation level.

## 2023-04-01 NOTE — ASSESSMENT & PLAN NOTE
One time fever noted but unclear source  2nd R toe does not appear to have drainage  UA pending  Will defer antibiotic therapy at this time and monitor

## 2023-04-01 NOTE — NURSING
Dr. Donaldson consulted for possible amputation of second toe, right foot. He stated that he will come in the morning to see the patient.

## 2023-04-01 NOTE — ASSESSMENT & PLAN NOTE
Lab Results   Component Value Date    CREATININE 2.3 (H) 03/31/2023     Sr Cr elevated  Secondary to volume depletion  Continue IVF   Cont to monitor with daily labs   Avoid nephrotoxins.   Renally dose all medications   Monitor events that may lead to decreased renal perfusion (hypovolemia, hypotension, sepsis).   Monitor urine output (goal 0.5 mL/kg/hr) to assure that no obstruction precipitates worsening in GFR.

## 2023-04-01 NOTE — H&P
"[facility] Medicine  History & Physical    Patient Name: Juan Pablo Chang  MRN: [id number]  Admission Date: 3/31/2023  Attending Physician: Devin Baker MD   Primary Care Provider: Primary Doctor No         Patient information was obtained from patient and ER records.       Subjective:     Principal Problem:Orthostatic hypotension    Chief Complaint:   Chief Complaint   Patient presents with    Dizziness     Pt reports dizziness with associated falls/balance issues for "weeks".         HPI: The patient is a 63 y/o male with PMH of T2DM, neuropathy, and HTN who presented with dizziness and falls. He states falling about 8 times today. He reports dizziness and balance issues over the past 4 weeks. On presentation to the ER he was found to be orthostatic. Patient also found to have 3rd metatarsal fracture and dislocation of the 2nd toe. The second toe also appears to be ecchymotic and dusky. He reports that the toe had been like this for several months. He also had a temp of 101 in the ER.       Past Medical History:   Diagnosis Date    Diabetes mellitus     Hypertension     Neuropathy        History reviewed. No pertinent surgical history.    Review of patient's allergies indicates:  No Known Allergies    No current facility-administered medications on file prior to encounter.     Current Outpatient Medications on File Prior to Encounter   Medication Sig    gabapentin (NEURONTIN) 800 MG tablet Take 800 mg by mouth 3 (three) times daily.    glipiZIDE (GLUCOTROL) 10 MG tablet Take 10 mg by mouth 2 (two) times daily before meals.    INV LISINOPRIL-HCTZ 20-25 Take 1 tablet by mouth once daily. FOR INVESTIGATIONAL USE ONLY    metFORMIN (GLUCOPHAGE) 1000 MG tablet Take 1,000 mg by mouth 2 (two) times daily with meals.    promethazine (PHENERGAN) 25 MG tablet Take 2 tablets (50 mg total) by mouth every 6 (six) hours as needed for Nausea.     Family History    None       Tobacco Use    Smoking " status: Former    Smokeless tobacco: Current   Substance and Sexual Activity    Alcohol use: Not Currently    Drug use: Never    Sexual activity: Not on file     Review of Systems   Cardiovascular:  Negative for chest pain.   Neurological:  Positive for dizziness and syncope.   Objective:     Vital Signs (Most Recent):  Temp: 96.9 °F (36.1 °C) (03/31/23 2331)  Pulse: 64 (03/31/23 2331)  Resp: 14 (03/31/23 2331)  BP: (!) 110/56 (03/31/23 2331)  SpO2: 97 % (03/31/23 2331)   Vital Signs (24h Range):  Temp:  [96.9 °F (36.1 °C)-101.9 °F (38.8 °C)] 96.9 °F (36.1 °C)  Pulse:  [64-96] 64  Resp:  [12-20] 14  SpO2:  [95 %-99 %] 97 %  BP: ()/(44-89) 110/56     Weight: 69 kg (152 lb 1.9 oz)  Body mass index is 23.13 kg/m².    Physical Exam  Cardiovascular:      Rate and Rhythm: Normal rate.   Pulmonary:      Effort: Pulmonary effort is normal. No respiratory distress.   Musculoskeletal:      Comments: Amputation of R great toe  Ecchymotic appearance to 2nd R toe    Neurological:      Mental Status: He is alert.           Significant Labs: All pertinent labs within the past 24 hours have been reviewed.    Significant Imaging: I have reviewed all pertinent imaging results/findings within the past 24 hours.    Assessment/Plan:     * Orthostatic hypotension  Hold lisinopril-HCTZ  Continue IVF  Telemetry       STERLING (acute kidney injury)  Lab Results   Component Value Date    CREATININE 2.3 (H) 03/31/2023     Sr Cr elevated  Secondary to volume depletion  Continue IVF   Cont to monitor with daily labs   Avoid nephrotoxins.   Renally dose all medications   Monitor events that may lead to decreased renal perfusion (hypovolemia, hypotension, sepsis).   Monitor urine output (goal 0.5 mL/kg/hr) to assure that no obstruction precipitates worsening in GFR.          Fever  One time fever noted but unclear source  2nd R toe does not appear to have drainage  UA pending  Will defer antibiotic therapy at this time and monitor         Elevated troponin  Some T wave inversions noted on EKG  Trend troponin   Given ASA  No CP reported  2D echo     Dislocated toe     Orthopedics consult     VTE Risk Mitigation (From admission, onward)           Ordered     heparin (porcine) injection 5,000 Units  Every 8 hours         04/01/23 0025                         The attending portion of this evaluation, treatment, and documentation was performed per Sindy Anderson MD via Telemedicine AudioVisual using the secure [employer] software platform with 2 way audio/video. The provider was located off-site and the patient is located in the hospital. The aforementioned video software was utilized to document the relevant history and physical exam      Sindy Anderson MD  Department of Hospital Medicine   [facility] - Intensive Care

## 2023-04-01 NOTE — ASSESSMENT & PLAN NOTE
Lab Results   Component Value Date    CREATININE 2.0 (H) 04/01/2023     Sr Cr elevated, unknown baseline (suspect some CKD), improved with IVF  Daily CMP  Avoid nephrotoxins.   Renally dose all medications   Monitor events that may lead to decreased renal perfusion (hypovolemia, hypotension, sepsis).   Monitor urine output (goal 0.5 mL/kg/hr) to assure that no obstruction precipitates worsening in GFR.

## 2023-04-01 NOTE — SUBJECTIVE & OBJECTIVE
Interval History: This morning denies chest pain, worsening foot pain or swelling.  Actually asking if he can be discharged. BP normal, no subjective ortho stasis this morning.  Episode of asymptomatic hypoglycemia this am that corrected with oral sugar. Did spike another fever this afternoon.    Review of Systems  Objective:     Vital Signs (Most Recent):  Temp: (!) 100.6 °F (38.1 °C) (04/01/23 1645)  Pulse: 85 (04/01/23 1645)  Resp: 18 (04/01/23 1645)  BP: (!) 151/67 (04/01/23 1645)  SpO2: 96 % (04/01/23 1645)   Vital Signs (24h Range):  Temp:  [96.9 °F (36.1 °C)-100.6 °F (38.1 °C)] 100.6 °F (38.1 °C)  Pulse:  [64-90] 85  Resp:  [12-20] 18  SpO2:  [94 %-99 %] 96 %  BP: ()/(44-89) 151/67     Weight: 69 kg (152 lb 1.9 oz)  Body mass index is 23.13 kg/m².    Intake/Output Summary (Last 24 hours) at 4/1/2023 1716  Last data filed at 4/1/2023 1157  Gross per 24 hour   Intake 60 ml   Output 1250 ml   Net -1190 ml      Physical Exam  Vitals reviewed  General: NAD, Well developed, Well Nourished  Head: NC/AT  Eyes: EOMI, JAY  Cardiovascular: Pulses intact distally, Regular Rate and rhythm  Pulmonary: Normal Respiratory Rate, No respiratory distress  Gi: Soft, Non-tender  Extremities: Warm, surgical stump from RGT amputation well healed, right second toe ecchymotic, swollen, erythematous, no crepitus or bullae on palpation, easily palpable DP and PT pulses  Skin: Warm, dry  Neuro: Alert, Oriented x3, No focal Deficit  Psych: Appropriate mood and affect    Significant Labs: All pertinent labs within the past 24 hours have been reviewed.    Significant Imaging: I have reviewed all pertinent imaging results/findings within the past 24 hours.

## 2023-04-01 NOTE — ASSESSMENT & PLAN NOTE
Time course of swollen foot/toe uncertain, it is not acutely worsening since admission   BCx ordered and pending  Xrays with soft tissue swelling, questionable gas  Broad spectrum abx (vanc, cefepime, flagyl renally dosed)  CT, MRI ordered to evaluate deep space infection (nec fas, osteo)  Podiatry consulted for surgical evaluation, will eval in am, appreciate assistance  LINREC score 6 pts, intermediate risk, added clinda pending CT

## 2023-04-01 NOTE — PROGRESS NOTES
Pharmacokinetic Initial Assessment: IV Vancomycin    Assessment/Plan:    Initiate intravenous vancomycin with loading dose of 750  mg once followed by a maintenance dose of vancomycin 750mg IV every 24 hours  Desired empiric serum trough concentration is 10 to 15 mcg/mL  Draw vancomycin trough level 30 min prior to third dose on 04/03/2023 at approximately 1700  Pharmacy will continue to follow and monitor vancomycin.      Please contact pharmacy at extension [phone] with any questions regarding this assessment.     Thank you for the consult,   Erik Gurrola       Patient brief summary:  Juan Pablo Chang is a 62 y.o. male initiated on antimicrobial therapy with IV Vancomycin for treatment of suspected skin & soft tissue infection    Drug Allergies:   Review of patient's allergies indicates:  No Known Allergies    Actual Body Weight:   69 Kg    Renal Function:   Estimated Creatinine Clearance: 36.1 mL/min (A) (based on SCr of 2 mg/dL (H)).,     Dialysis Method (if applicable):  N/A    CBC (last 72 hours):  Recent Labs   Lab Result Units 03/31/23  1750 04/01/23  0435   WBC K/uL 11.89 11.65   Hemoglobin g/dL 10.5* 8.8*   Hemoglobin A1C % 6.5*  --    Hematocrit % 31.5* 26.8*   Platelets K/uL 266 237   Gran % % 83.0* 77.4*   Lymph % % 6.8* 9.6*   Mono % % 9.3 10.7   Eosinophil % % 0.3 1.5   Basophil % % 0.3 0.3   Differential Method  Automated Automated       Metabolic Panel (last 72 hours):  Recent Labs   Lab Result Units 03/31/23  1750 03/31/23  2338 04/01/23  0435   Sodium mmol/L 133*  --  140   Potassium mmol/L 4.4  --  4.0   Chloride mmol/L 101  --  109   CO2 mmol/L 24  --  21*   Glucose mg/dL 151*  --  62*   Glucose, UA   --  Negative  --    BUN mg/dL 32*  --  32*   Creatinine mg/dL 2.3*  --  2.0*   Albumin g/dL 2.9*  --   --    Total Bilirubin mg/dL 0.8  --   --    Alkaline Phosphatase U/L 63  --   --    AST U/L 30  --   --    ALT U/L 21  --   --    Magnesium mg/dL  --   --  2.0   Phosphorus mg/dL  --   --  3.7        Drug levels (last 3 results):  No results for input(s): VANCOMYCINRA, VANCORANDOM, VANCOMYCINPE, VANCOPEAK, VANCOMYCINTR, VANCOTROUGH in the last 72 hours.    Microbiologic Results:  Microbiology Results (last 7 days)       Procedure Component Value Units Date/Time    Blood Culture #2 **CANNOT BE ORDERED STAT** [[id number]] Collected: 03/31/23 1924    Order Status: Sent Specimen: Blood from Peripheral, Antecubital, Left Updated: 04/01/23 1543    Blood Culture #1 **CANNOT BE ORDERED STAT** [[id number]] Collected: 03/31/23 1925    Order Status: Sent Specimen: Blood from Peripheral, Antecubital, Right Updated: 04/01/23 1543    Influenza A & B by Molecular [[id number]] Collected: 03/31/23 1921    Order Status: Completed Specimen: Nasopharyngeal Swab Updated: 03/31/23 2015     Influenza A, Molecular Negative     Influenza B, Molecular Negative     Flu A & B Source Nasal Swab

## 2023-04-01 NOTE — NURSING
Per radiology, unable to obtain CT with contrast at this time.  GFR 36.6.  Dr. Cabrera notified.  Orders received.

## 2023-04-01 NOTE — SUBJECTIVE & OBJECTIVE
Past Medical History:   Diagnosis Date    Diabetes mellitus     Hypertension     Neuropathy        History reviewed. No pertinent surgical history.    Review of patient's allergies indicates:  No Known Allergies    No current facility-administered medications on file prior to encounter.     Current Outpatient Medications on File Prior to Encounter   Medication Sig    gabapentin (NEURONTIN) 800 MG tablet Take 800 mg by mouth 3 (three) times daily.    glipiZIDE (GLUCOTROL) 10 MG tablet Take 10 mg by mouth 2 (two) times daily before meals.    INV LISINOPRIL-HCTZ 20-25 Take 1 tablet by mouth once daily. FOR INVESTIGATIONAL USE ONLY    metFORMIN (GLUCOPHAGE) 1000 MG tablet Take 1,000 mg by mouth 2 (two) times daily with meals.    promethazine (PHENERGAN) 25 MG tablet Take 2 tablets (50 mg total) by mouth every 6 (six) hours as needed for Nausea.     Family History    None       Tobacco Use    Smoking status: Former    Smokeless tobacco: Current   Substance and Sexual Activity    Alcohol use: Not Currently    Drug use: Never    Sexual activity: Not on file     Review of Systems   Cardiovascular:  Negative for chest pain.   Neurological:  Positive for dizziness and syncope.   Objective:     Vital Signs (Most Recent):  Temp: 96.9 °F (36.1 °C) (03/31/23 2331)  Pulse: 64 (03/31/23 2331)  Resp: 14 (03/31/23 2331)  BP: (!) 110/56 (03/31/23 2331)  SpO2: 97 % (03/31/23 2331)   Vital Signs (24h Range):  Temp:  [96.9 °F (36.1 °C)-101.9 °F (38.8 °C)] 96.9 °F (36.1 °C)  Pulse:  [64-96] 64  Resp:  [12-20] 14  SpO2:  [95 %-99 %] 97 %  BP: ()/(44-89) 110/56     Weight: 69 kg (152 lb 1.9 oz)  Body mass index is 23.13 kg/m².    Physical Exam  Cardiovascular:      Rate and Rhythm: Normal rate.   Pulmonary:      Effort: Pulmonary effort is normal. No respiratory distress.   Musculoskeletal:      Comments: Amputation of R great toe  Ecchymotic appearance to 2nd R toe    Neurological:      Mental Status: He is alert.            Significant Labs: All pertinent labs within the past 24 hours have been reviewed.    Significant Imaging: I have reviewed all pertinent imaging results/findings within the past 24 hours.

## 2023-04-01 NOTE — PLAN OF CARE
Problem: Adult Inpatient Plan of Care  Goal: Plan of Care Review  Outcome: Ongoing, Progressing  Goal: Patient-Specific Goal (Individualized)  Outcome: Ongoing, Progressing  Goal: Absence of Hospital-Acquired Illness or Injury  Outcome: Ongoing, Progressing  Goal: Optimal Comfort and Wellbeing  Outcome: Ongoing, Progressing  Intervention: Monitor Pain and Promote Comfort  Flowsheets (Taken 4/1/2023 5671)  Pain Management Interventions: medication offered     Problem: Impaired Wound Healing  Goal: Optimal Wound Healing  Outcome: Ongoing, Progressing     Problem: Infection  Goal: Absence of Infection Signs and Symptoms  Outcome: Ongoing, Progressing     Problem: Fluid and Electrolyte Imbalance (Acute Kidney Injury/Impairment)  Goal: Fluid and Electrolyte Balance  Outcome: Ongoing, Progressing

## 2023-04-01 NOTE — ASSESSMENT & PLAN NOTE
Orthostasis improved.   Suspect mechanical component due to neuropathy and RGT amputation   PT/OT eval on Monday

## 2023-04-01 NOTE — ASSESSMENT & PLAN NOTE
Possibly in setting of sepsis/infection   Now improved after IVF  Hold lisinopril-HCTZ, restart as able with STERLING  Telemetry     Some T wave inversions noted on EKG  Troponin peaked, asymptomatic   Low suspicion for STEMI/NSTEMI  Given ASA  2D echo

## 2023-04-01 NOTE — PROGRESS NOTES
Pharmacist Renal Dose Adjustment Note    Juan Pablo Chang is a 62 y.o. male being treated with the medication gabapentin    Patient Data:    Vital Signs (Most Recent):  Temp: 96.9 °F (36.1 °C) (03/31/23 2331)  Pulse: 64 (03/31/23 2331)  Resp: 14 (03/31/23 2331)  BP: (!) 110/56 (03/31/23 2331)  SpO2: 97 % (03/31/23 2331)   Vital Signs (72h Range):  Temp:  [96.9 °F (36.1 °C)-101.9 °F (38.8 °C)]   Pulse:  [64-96]   Resp:  [12-20]   BP: ()/(44-89)   SpO2:  [95 %-99 %]      Recent Labs   Lab 03/31/23 1750   CREATININE 2.3*     Serum creatinine: 2.3 mg/dL (H) 03/31/23 1750  Estimated creatinine clearance: 31.4 mL/min (A)    Medication:gabapentin dose: 800mg frequency tid will be changed to medication:gabapentin dose:400mg frequency:tid    Pharmacist's Name: Hi Ayon

## 2023-04-01 NOTE — ASSESSMENT & PLAN NOTE
Patient's FSGs are controlled on current medication regimen.  Last A1c reviewed-   Lab Results   Component Value Date    HGBA1C 6.5 (H) 03/31/2023     Most recent fingerstick glucose reviewed-   Recent Labs   Lab 04/01/23  0730 04/01/23  1114 04/01/23  1623   POCTGLUCOSE 65* 124* 111*     Current correctional scale  Low  Maintain anti-hyperglycemic dose as follows-   Antihyperglycemics (From admission, onward)    Start     Stop Route Frequency Ordered    04/01/23 1214  insulin aspart U-100 pen 0-5 Units         -- SubQ Before meals & nightly PRN 04/01/23 1115        Hold Oral hypoglycemics while patient is in the hospital.    Episode of asymptomatic hypoglycemia this am that corrected with oral sugar

## 2023-04-02 ENCOUNTER — ANESTHESIA EVENT (OUTPATIENT)
Dept: SURGERY | Facility: HOSPITAL | Age: 63
End: 2023-04-02
Payer: MEDICAID

## 2023-04-02 ENCOUNTER — ANESTHESIA (OUTPATIENT)
Dept: SURGERY | Facility: HOSPITAL | Age: 63
End: 2023-04-02
Payer: MEDICAID

## 2023-04-02 PROBLEM — L03.115 CELLULITIS OF RIGHT LOWER EXTREMITY: Status: ACTIVE | Noted: 2023-04-02

## 2023-04-02 PROBLEM — L02.611 CELLULITIS AND ABSCESS OF TOE OF RIGHT FOOT: Status: ACTIVE | Noted: 2023-04-01

## 2023-04-02 PROBLEM — L03.031 CELLULITIS AND ABSCESS OF TOE OF RIGHT FOOT: Status: ACTIVE | Noted: 2023-04-01

## 2023-04-02 PROBLEM — J20.9 ACUTE BRONCHITIS: Status: ACTIVE | Noted: 2023-04-02

## 2023-04-02 PROBLEM — M71.071 ABSCESS OF BURSA OF RIGHT FOOT: Status: ACTIVE | Noted: 2023-04-02

## 2023-04-02 PROBLEM — M86.371 CHRONIC MULTIFOCAL OSTEOMYELITIS OF RIGHT FOOT: Status: ACTIVE | Noted: 2023-04-02

## 2023-04-02 PROBLEM — J40 BRONCHITIS: Status: ACTIVE | Noted: 2023-04-02

## 2023-04-02 LAB
ALBUMIN SERPL BCP-MCNC: 2.4 G/DL (ref 3.5–5.2)
ALP SERPL-CCNC: 58 U/L (ref 55–135)
ALT SERPL W/O P-5'-P-CCNC: 18 U/L (ref 10–44)
ANION GAP SERPL CALC-SCNC: 12 MMOL/L (ref 8–16)
AST SERPL-CCNC: 24 U/L (ref 10–40)
BASOPHILS # BLD AUTO: 0.05 K/UL (ref 0–0.2)
BASOPHILS NFR BLD: 0.3 % (ref 0–1.9)
BILIRUB SERPL-MCNC: 0.7 MG/DL (ref 0.1–1)
BUN SERPL-MCNC: 21 MG/DL (ref 8–23)
CALCIUM SERPL-MCNC: 8.5 MG/DL (ref 8.7–10.5)
CHLORIDE SERPL-SCNC: 110 MMOL/L (ref 95–110)
CO2 SERPL-SCNC: 18 MMOL/L (ref 23–29)
CREAT SERPL-MCNC: 1.7 MG/DL (ref 0.5–1.4)
DIFFERENTIAL METHOD: ABNORMAL
EOSINOPHIL # BLD AUTO: 0.1 K/UL (ref 0–0.5)
EOSINOPHIL NFR BLD: 0.4 % (ref 0–8)
ERYTHROCYTE [DISTWIDTH] IN BLOOD BY AUTOMATED COUNT: 13.1 % (ref 11.5–14.5)
ERYTHROCYTE [SEDIMENTATION RATE] IN BLOOD BY WESTERGREN METHOD: >90 MM/HR (ref 0–10)
EST. GFR  (NO RACE VARIABLE): 44.5 ML/MIN/1.73 M^2
GLUCOSE SERPL-MCNC: 128 MG/DL (ref 70–110)
HCT VFR BLD AUTO: 28.8 % (ref 40–54)
HGB BLD-MCNC: 9.6 G/DL (ref 14–18)
IMM GRANULOCYTES # BLD AUTO: 0.05 K/UL (ref 0–0.04)
IMM GRANULOCYTES NFR BLD AUTO: 0.3 % (ref 0–0.5)
LYMPHOCYTES # BLD AUTO: 0.8 K/UL (ref 1–4.8)
LYMPHOCYTES NFR BLD: 5.4 % (ref 18–48)
MCH RBC QN AUTO: 29.3 PG (ref 27–31)
MCHC RBC AUTO-ENTMCNC: 33.3 G/DL (ref 32–36)
MCV RBC AUTO: 88 FL (ref 82–98)
MONOCYTES # BLD AUTO: 1 K/UL (ref 0.3–1)
MONOCYTES NFR BLD: 6.1 % (ref 4–15)
NEUTROPHILS # BLD AUTO: 13.7 K/UL (ref 1.8–7.7)
NEUTROPHILS NFR BLD: 87.5 % (ref 38–73)
NRBC BLD-RTO: 0 /100 WBC
PLATELET # BLD AUTO: 300 K/UL (ref 150–450)
PMV BLD AUTO: 11 FL (ref 9.2–12.9)
POCT GLUCOSE: 131 MG/DL (ref 70–110)
POCT GLUCOSE: 140 MG/DL (ref 70–110)
POCT GLUCOSE: 159 MG/DL (ref 70–110)
POTASSIUM SERPL-SCNC: 4.4 MMOL/L (ref 3.5–5.1)
PROT SERPL-MCNC: 6.4 G/DL (ref 6–8.4)
RBC # BLD AUTO: 3.28 M/UL (ref 4.6–6.2)
SODIUM SERPL-SCNC: 140 MMOL/L (ref 136–145)
TROPONIN I SERPL DL<=0.01 NG/ML-MCNC: 0.35 NG/ML (ref 0–0.03)
TROPONIN I SERPL DL<=0.01 NG/ML-MCNC: 0.58 NG/ML (ref 0–0.03)
WBC # BLD AUTO: 15.64 K/UL (ref 3.9–12.7)

## 2023-04-02 PROCEDURE — 85651 RBC SED RATE NONAUTOMATED: CPT | Performed by: STUDENT IN AN ORGANIZED HEALTH CARE EDUCATION/TRAINING PROGRAM

## 2023-04-02 PROCEDURE — D9220A PRA ANESTHESIA: ICD-10-PCS | Mod: ANES,,, | Performed by: ANESTHESIOLOGY

## 2023-04-02 PROCEDURE — D9220A PRA ANESTHESIA: Mod: CRNA,,, | Performed by: NURSE ANESTHETIST, CERTIFIED REGISTERED

## 2023-04-02 PROCEDURE — 25000003 PHARM REV CODE 250: Performed by: HOSPITALIST

## 2023-04-02 PROCEDURE — 88305 TISSUE EXAM BY PATHOLOGIST: CPT | Mod: 26,,, | Performed by: STUDENT IN AN ORGANIZED HEALTH CARE EDUCATION/TRAINING PROGRAM

## 2023-04-02 PROCEDURE — 99223 1ST HOSP IP/OBS HIGH 75: CPT | Mod: 57,,, | Performed by: PODIATRIST

## 2023-04-02 PROCEDURE — 14041 TIS TRNFR F/C/C/M/N/A/G/H/F: CPT | Mod: ,,, | Performed by: PODIATRIST

## 2023-04-02 PROCEDURE — 63600175 PHARM REV CODE 636 W HCPCS: Mod: UD | Performed by: PODIATRIST

## 2023-04-02 PROCEDURE — 87070 CULTURE OTHR SPECIMN AEROBIC: CPT | Performed by: STUDENT IN AN ORGANIZED HEALTH CARE EDUCATION/TRAINING PROGRAM

## 2023-04-02 PROCEDURE — 88305 TISSUE EXAM BY PATHOLOGIST: CPT | Performed by: STUDENT IN AN ORGANIZED HEALTH CARE EDUCATION/TRAINING PROGRAM

## 2023-04-02 PROCEDURE — 87186 SC STD MICRODIL/AGAR DIL: CPT | Performed by: STUDENT IN AN ORGANIZED HEALTH CARE EDUCATION/TRAINING PROGRAM

## 2023-04-02 PROCEDURE — 93005 ELECTROCARDIOGRAM TRACING: CPT

## 2023-04-02 PROCEDURE — 84484 ASSAY OF TROPONIN QUANT: CPT | Performed by: STUDENT IN AN ORGANIZED HEALTH CARE EDUCATION/TRAINING PROGRAM

## 2023-04-02 PROCEDURE — 99233 PR SUBSEQUENT HOSPITAL CARE,LEVL III: ICD-10-PCS | Mod: ,,, | Performed by: STUDENT IN AN ORGANIZED HEALTH CARE EDUCATION/TRAINING PROGRAM

## 2023-04-02 PROCEDURE — 25000003 PHARM REV CODE 250: Performed by: PODIATRIST

## 2023-04-02 PROCEDURE — 63600175 PHARM REV CODE 636 W HCPCS: Performed by: PODIATRIST

## 2023-04-02 PROCEDURE — 99223 PR INITIAL HOSPITAL CARE,LEVL III: ICD-10-PCS | Mod: 57,,, | Performed by: PODIATRIST

## 2023-04-02 PROCEDURE — 28805 PR AMPUTATION FOOT,TRANSMETATARSAL: ICD-10-PCS | Mod: 51,RT,, | Performed by: PODIATRIST

## 2023-04-02 PROCEDURE — 36415 COLL VENOUS BLD VENIPUNCTURE: CPT | Performed by: STUDENT IN AN ORGANIZED HEALTH CARE EDUCATION/TRAINING PROGRAM

## 2023-04-02 PROCEDURE — 71000033 HC RECOVERY, INTIAL HOUR: Performed by: PODIATRIST

## 2023-04-02 PROCEDURE — 93010 ELECTROCARDIOGRAM REPORT: CPT | Mod: ,,, | Performed by: INTERNAL MEDICINE

## 2023-04-02 PROCEDURE — 37000009 HC ANESTHESIA EA ADD 15 MINS: Performed by: PODIATRIST

## 2023-04-02 PROCEDURE — 85025 COMPLETE CBC W/AUTO DIFF WBC: CPT | Performed by: STUDENT IN AN ORGANIZED HEALTH CARE EDUCATION/TRAINING PROGRAM

## 2023-04-02 PROCEDURE — 27201423 OPTIME MED/SURG SUP & DEVICES STERILE SUPPLY: Performed by: PODIATRIST

## 2023-04-02 PROCEDURE — 14041 PR ADJ TISS XFER HEAD,FAC,HAND 10.1-30 SQCM: ICD-10-PCS | Mod: ,,, | Performed by: PODIATRIST

## 2023-04-02 PROCEDURE — 99233 SBSQ HOSP IP/OBS HIGH 50: CPT | Mod: ,,, | Performed by: STUDENT IN AN ORGANIZED HEALTH CARE EDUCATION/TRAINING PROGRAM

## 2023-04-02 PROCEDURE — 63600175 PHARM REV CODE 636 W HCPCS: Performed by: NURSE ANESTHETIST, CERTIFIED REGISTERED

## 2023-04-02 PROCEDURE — 25000003 PHARM REV CODE 250: Performed by: INTERNAL MEDICINE

## 2023-04-02 PROCEDURE — 36000707: Performed by: PODIATRIST

## 2023-04-02 PROCEDURE — 25000003 PHARM REV CODE 250: Performed by: STUDENT IN AN ORGANIZED HEALTH CARE EDUCATION/TRAINING PROGRAM

## 2023-04-02 PROCEDURE — 28805 AMPUTATION THRU METATARSAL: CPT | Mod: 51,RT,, | Performed by: PODIATRIST

## 2023-04-02 PROCEDURE — 20000000 HC ICU ROOM

## 2023-04-02 PROCEDURE — D9220A PRA ANESTHESIA: ICD-10-PCS | Mod: CRNA,,, | Performed by: NURSE ANESTHETIST, CERTIFIED REGISTERED

## 2023-04-02 PROCEDURE — 88305 TISSUE EXAM BY PATHOLOGIST: ICD-10-PCS | Mod: 26,,, | Performed by: STUDENT IN AN ORGANIZED HEALTH CARE EDUCATION/TRAINING PROGRAM

## 2023-04-02 PROCEDURE — 93010 EKG 12-LEAD: ICD-10-PCS | Mod: ,,, | Performed by: INTERNAL MEDICINE

## 2023-04-02 PROCEDURE — S0030 INJECTION, METRONIDAZOLE: HCPCS | Performed by: STUDENT IN AN ORGANIZED HEALTH CARE EDUCATION/TRAINING PROGRAM

## 2023-04-02 PROCEDURE — 80053 COMPREHEN METABOLIC PANEL: CPT | Performed by: STUDENT IN AN ORGANIZED HEALTH CARE EDUCATION/TRAINING PROGRAM

## 2023-04-02 PROCEDURE — 87077 CULTURE AEROBIC IDENTIFY: CPT | Performed by: STUDENT IN AN ORGANIZED HEALTH CARE EDUCATION/TRAINING PROGRAM

## 2023-04-02 PROCEDURE — 27000221 HC OXYGEN, UP TO 24 HOURS

## 2023-04-02 PROCEDURE — D9220A PRA ANESTHESIA: Mod: ANES,,, | Performed by: ANESTHESIOLOGY

## 2023-04-02 PROCEDURE — 37000008 HC ANESTHESIA 1ST 15 MINUTES: Performed by: PODIATRIST

## 2023-04-02 PROCEDURE — 87075 CULTR BACTERIA EXCEPT BLOOD: CPT | Performed by: STUDENT IN AN ORGANIZED HEALTH CARE EDUCATION/TRAINING PROGRAM

## 2023-04-02 PROCEDURE — 36000706: Performed by: PODIATRIST

## 2023-04-02 RX ORDER — MORPHINE SULFATE 2 MG/ML
2 INJECTION, SOLUTION INTRAMUSCULAR; INTRAVENOUS EVERY 5 MIN PRN
Status: DISCONTINUED | OUTPATIENT
Start: 2023-04-02 | End: 2023-04-02 | Stop reason: HOSPADM

## 2023-04-02 RX ORDER — GUAIFENESIN 100 MG/5ML
200 SOLUTION ORAL EVERY 4 HOURS PRN
Status: DISCONTINUED | OUTPATIENT
Start: 2023-04-02 | End: 2023-04-07 | Stop reason: HOSPADM

## 2023-04-02 RX ORDER — LIDOCAINE HYDROCHLORIDE 10 MG/ML
1 INJECTION, SOLUTION EPIDURAL; INFILTRATION; INTRACAUDAL; PERINEURAL ONCE
Status: DISCONTINUED | OUTPATIENT
Start: 2023-04-02 | End: 2023-04-02 | Stop reason: HOSPADM

## 2023-04-02 RX ORDER — SODIUM CHLORIDE, SODIUM LACTATE, POTASSIUM CHLORIDE, CALCIUM CHLORIDE 600; 310; 30; 20 MG/100ML; MG/100ML; MG/100ML; MG/100ML
INJECTION, SOLUTION INTRAVENOUS CONTINUOUS PRN
Status: DISCONTINUED | OUTPATIENT
Start: 2023-04-02 | End: 2023-04-02

## 2023-04-02 RX ORDER — OXYCODONE HYDROCHLORIDE 5 MG/1
5 TABLET ORAL EVERY 6 HOURS PRN
Status: DISCONTINUED | OUTPATIENT
Start: 2023-04-02 | End: 2023-04-02

## 2023-04-02 RX ORDER — FENTANYL CITRATE 50 UG/ML
INJECTION, SOLUTION INTRAMUSCULAR; INTRAVENOUS
Status: DISCONTINUED | OUTPATIENT
Start: 2023-04-02 | End: 2023-04-02

## 2023-04-02 RX ORDER — DIPHENHYDRAMINE HYDROCHLORIDE 50 MG/ML
12.5 INJECTION INTRAMUSCULAR; INTRAVENOUS
Status: DISCONTINUED | OUTPATIENT
Start: 2023-04-02 | End: 2023-04-02 | Stop reason: HOSPADM

## 2023-04-02 RX ORDER — BUPIVACAINE HYDROCHLORIDE 5 MG/ML
INJECTION, SOLUTION EPIDURAL; INTRACAUDAL
Status: DISCONTINUED | OUTPATIENT
Start: 2023-04-02 | End: 2023-04-02 | Stop reason: HOSPADM

## 2023-04-02 RX ORDER — ACETAMINOPHEN 325 MG/1
650 TABLET ORAL EVERY 6 HOURS PRN
Status: DISCONTINUED | OUTPATIENT
Start: 2023-04-02 | End: 2023-04-07 | Stop reason: HOSPADM

## 2023-04-02 RX ORDER — SODIUM CHLORIDE, SODIUM LACTATE, POTASSIUM CHLORIDE, CALCIUM CHLORIDE 600; 310; 30; 20 MG/100ML; MG/100ML; MG/100ML; MG/100ML
INJECTION, SOLUTION INTRAVENOUS CONTINUOUS
Status: DISCONTINUED | OUTPATIENT
Start: 2023-04-02 | End: 2023-04-02

## 2023-04-02 RX ORDER — OXYCODONE HYDROCHLORIDE 5 MG/1
5 TABLET ORAL EVERY 4 HOURS PRN
Status: DISCONTINUED | OUTPATIENT
Start: 2023-04-02 | End: 2023-04-07 | Stop reason: HOSPADM

## 2023-04-02 RX ORDER — FUROSEMIDE 20 MG/1
20 TABLET ORAL ONCE
Status: COMPLETED | OUTPATIENT
Start: 2023-04-02 | End: 2023-04-02

## 2023-04-02 RX ORDER — ONDANSETRON 2 MG/ML
4 INJECTION INTRAMUSCULAR; INTRAVENOUS DAILY PRN
Status: DISCONTINUED | OUTPATIENT
Start: 2023-04-02 | End: 2023-04-02 | Stop reason: HOSPADM

## 2023-04-02 RX ORDER — MIDAZOLAM HYDROCHLORIDE 1 MG/ML
INJECTION INTRAMUSCULAR; INTRAVENOUS
Status: DISCONTINUED | OUTPATIENT
Start: 2023-04-02 | End: 2023-04-02

## 2023-04-02 RX ORDER — METRONIDAZOLE 250 MG/1
500 TABLET ORAL EVERY 8 HOURS
Status: DISCONTINUED | OUTPATIENT
Start: 2023-04-02 | End: 2023-04-05

## 2023-04-02 RX ORDER — SODIUM CHLORIDE, SODIUM LACTATE, POTASSIUM CHLORIDE, CALCIUM CHLORIDE 600; 310; 30; 20 MG/100ML; MG/100ML; MG/100ML; MG/100ML
125 INJECTION, SOLUTION INTRAVENOUS CONTINUOUS
Status: DISCONTINUED | OUTPATIENT
Start: 2023-04-02 | End: 2023-04-02

## 2023-04-02 RX ORDER — ONDANSETRON 2 MG/ML
INJECTION INTRAMUSCULAR; INTRAVENOUS
Status: DISCONTINUED | OUTPATIENT
Start: 2023-04-02 | End: 2023-04-02

## 2023-04-02 RX ORDER — LIDOCAINE HYDROCHLORIDE 10 MG/ML
INJECTION INFILTRATION; PERINEURAL
Status: DISCONTINUED | OUTPATIENT
Start: 2023-04-02 | End: 2023-04-02 | Stop reason: HOSPADM

## 2023-04-02 RX ADMIN — MIDAZOLAM HYDROCHLORIDE 1 MG: 1 INJECTION, SOLUTION INTRAMUSCULAR; INTRAVENOUS at 10:04

## 2023-04-02 RX ADMIN — FENTANYL CITRATE 25 MCG: 50 INJECTION, SOLUTION INTRAMUSCULAR; INTRAVENOUS at 11:04

## 2023-04-02 RX ADMIN — INSULIN ASPART 1 UNITS: 100 INJECTION, SOLUTION INTRAVENOUS; SUBCUTANEOUS at 08:04

## 2023-04-02 RX ADMIN — FENTANYL CITRATE 25 MCG: 50 INJECTION, SOLUTION INTRAMUSCULAR; INTRAVENOUS at 10:04

## 2023-04-02 RX ADMIN — GABAPENTIN 400 MG: 100 CAPSULE ORAL at 08:04

## 2023-04-02 RX ADMIN — METRONIDAZOLE 500 MG: 250 TABLET ORAL at 08:04

## 2023-04-02 RX ADMIN — ACETAMINOPHEN 650 MG: 325 TABLET ORAL at 08:04

## 2023-04-02 RX ADMIN — METRONIDAZOLE 500 MG: 5 INJECTION, SOLUTION INTRAVENOUS at 02:04

## 2023-04-02 RX ADMIN — CEFEPIME 2 G: 2 INJECTION, POWDER, FOR SOLUTION INTRAVENOUS at 08:04

## 2023-04-02 RX ADMIN — ACETAMINOPHEN 650 MG: 325 TABLET ORAL at 11:04

## 2023-04-02 RX ADMIN — VANCOMYCIN HYDROCHLORIDE 750 MG: 750 INJECTION, POWDER, LYOPHILIZED, FOR SOLUTION INTRAVENOUS at 04:04

## 2023-04-02 RX ADMIN — METRONIDAZOLE 500 MG: 5 INJECTION, SOLUTION INTRAVENOUS at 09:04

## 2023-04-02 RX ADMIN — ONDANSETRON 4 MG: 2 INJECTION INTRAMUSCULAR; INTRAVENOUS at 10:04

## 2023-04-02 RX ADMIN — CLINDAMYCIN IN 5 PERCENT DEXTROSE 900 MG: 18 INJECTION, SOLUTION INTRAVENOUS at 03:04

## 2023-04-02 RX ADMIN — SODIUM CHLORIDE, POTASSIUM CHLORIDE, SODIUM LACTATE AND CALCIUM CHLORIDE: 600; 310; 30; 20 INJECTION, SOLUTION INTRAVENOUS at 10:04

## 2023-04-02 RX ADMIN — OXYCODONE HYDROCHLORIDE 5 MG: 5 TABLET ORAL at 04:04

## 2023-04-02 RX ADMIN — FUROSEMIDE 20 MG: 20 TABLET ORAL at 02:04

## 2023-04-02 RX ADMIN — GABAPENTIN 400 MG: 100 CAPSULE ORAL at 09:04

## 2023-04-02 NOTE — ASSESSMENT & PLAN NOTE
Possibly in setting of sepsis/infection   Now improved after IVF  Hold lisinopril-HCTZ, restart as able with STERLING  Telemetry

## 2023-04-02 NOTE — PLAN OF CARE
Pt meets criteria for transfer back to room. Pt transported via hospital bed, with O2 via NC. Awake and alert with no complaints. Bedside report given to DENNISE Christy. Call light placed in reach. Connected pt to O2 in room. Right foot elevated and cold packs present on foot.

## 2023-04-02 NOTE — ANESTHESIA POSTPROCEDURE EVALUATION
Anesthesia Post Evaluation    Patient: Juan Pablo Chang    Procedure(s) Performed: Procedure(s) (LRB):  AMPUTATION, FOOT, TRANSMETATARSAL (Right)    Final Anesthesia Type: general      Patient location during evaluation: PACU  Patient participation: Yes- Able to Participate  Level of consciousness: awake and alert  Post-procedure vital signs: reviewed and stable  Pain management: adequate  Airway patency: patent    PONV status at discharge: No PONV  Anesthetic complications: no      Cardiovascular status: blood pressure returned to baseline  Respiratory status: unassisted  Hydration status: euvolemic  Follow-up not needed.          Vitals Value Taken Time   /63 04/02/23 1227   Temp 36.6 °C (97.9 °F) 04/02/23 1210   Pulse 72 04/02/23 1231   Resp 35 04/02/23 1231   SpO2 93 % 04/02/23 1231   Vitals shown include unvalidated device data.      No case tracking events are documented in the log.      Pain/Maria Esther Score: Pain Rating Prior to Med Admin: 0 (4/2/2023  8:04 AM)  Pain Rating Post Med Admin: 2 (4/2/2023  8:46 AM)  Maria Esther Score: 9 (4/2/2023 12:25 PM)

## 2023-04-02 NOTE — PLAN OF CARE
"Received pt to pre-op area. Connected to monitors. Pt O2 sat 83-88% on RA, increasing to 91-92% with encouragement of deep breathing. Dry cough on command. (pt had not been using O2 prior to transport to dept). Notified Dr Feliciano and order rec'd for O2 via NC. Pt reports " I feel good with this O2 on".   "

## 2023-04-02 NOTE — BRIEF OP NOTE
[facility] - Surgery  Brief Operative Note    SUMMARY     Surgery Date: 4/2/2023     Surgeon(s) and Role:     * Deni Donaldson, DPM - Primary    Assisting Surgeon: None    Pre-op Diagnosis:  Type 2 diabetes mellitus with hypoglycemia without coma, without long-term current use of insulin [E11.649]  Chronic multifocal osteomyelitis of right foot [M86.371]    Post-op Diagnosis:  Post-Op Diagnosis Codes:     * Type 2 diabetes mellitus with hypoglycemia without coma, without long-term current use of insulin [E11.649]     * Chronic multifocal osteomyelitis of right foot [M86.371]    Procedure(s) (LRB):  AMPUTATION, FOOT, TRANSMETATARSAL (Right)    Anesthesia: Choice    Operative Findings:     Estimated Blood Loss: * No values recorded between 4/2/2023 11:04 AM and 4/2/2023 12:07 PM *    Estimated Blood Loss has been documented.         Specimens:   Specimen (24h ago, onward)       Start     Ordered    04/02/23 1122  Specimen to Pathology, Surgery Other (Podiatry)  Once        Comments: Pre-op Diagnosis: Type 2 diabetes mellitus with hypoglycemia without coma, without long-term current use of insulin [E11.649]Chronic multifocal osteomyelitis of right foot [M86.371]Procedure(s):AMPUTATION, FOOT, TRANSMETATARSAL Number of specimens: 1Name of specimens: 1. Right Forefoot Osteomyelitis     References:    Click here for ordering Quick Tip   Question Answer Comment   Procedure Type: Other Podiatry   Which provider would you like to cc? DENI DONALDSON    Release to patient Immediate        04/02/23 1126                    [id number]

## 2023-04-02 NOTE — ANESTHESIA PREPROCEDURE EVALUATION
04/02/2023  Juan Pablo Chang is a 62 y.o., male.      Pre-op Assessment    I have reviewed the Patient Summary Reports.     I have reviewed the Nursing Notes. I have reviewed the NPO Status.   I have reviewed the Medications.     Review of Systems  Social:  Former Smoker  Tobacco Use:  of chewing tobacco / snuff use   Hematology/Oncology:         -- Anemia:   EENT/Dental:EENT/Dental Normal   Cardiovascular:   Hypertension Past MI (Elevated troponin now, NSTEMI)     Pulmonary:  Pulmonary Normal    Renal/:   Chronic Renal Disease, ARF, CKD    Musculoskeletal:  Musculoskeletal Normal    Neurological:   Orthostatic near syncope  Peripheral Neuropathy    Endocrine:   Diabetes    Dermatological:  Skin Normal    Psych:  Psychiatric Normal           Physical Exam    Airway:  Mallampati: II   Mouth Opening: Normal  TM Distance: Normal  Tongue: Normal  Neck ROM: Normal ROM    Dental:Carious, very poor repair  Chest/Lungs:  Clear to auscultation    Heart:  Rate: Normal  Rhythm: Regular Rhythm        Anesthesia Plan  Type of Anesthesia, risks & benefits discussed:    Anesthesia Type: Gen Natural Airway  Intra-op Monitoring Plan: Standard ASA Monitors  Post Op Pain Control Plan: multimodal analgesia and peripheral nerve block  Induction:  IV  Informed Consent: Informed consent signed with the Patient and all parties understand the risks and agree with anesthesia plan.  All questions answered.   ASA Score: 4 Emergent  Day of Surgery Review of History & Physical: H&P Update referred to the surgeon/provider.    Ready For Surgery From Anesthesia Perspective.     .

## 2023-04-02 NOTE — ASSESSMENT & PLAN NOTE
Patient's FSGs are controlled on current medication regimen.  Last A1c reviewed-   Lab Results   Component Value Date    HGBA1C 6.5 (H) 03/31/2023     Most recent fingerstick glucose reviewed-   Recent Labs   Lab 04/01/23  1114 04/01/23  1623 04/01/23 2028 04/02/23  0723   POCTGLUCOSE 124* 111* 186* 140*     Current correctional scale  Low  Maintain anti-hyperglycemic dose as follows-   Antihyperglycemics (From admission, onward)    Start     Stop Route Frequency Ordered    04/01/23 1214  insulin aspart U-100 pen 0-5 Units         -- SubQ Before meals & nightly PRN 04/01/23 1115        Hold Oral hypoglycemics while patient is in the hospital.    Not requiring any insulin

## 2023-04-02 NOTE — ASSESSMENT & PLAN NOTE
Lab Results   Component Value Date    CREATININE 1.7 (H) 04/02/2023     Sr Cr elevated, unknown baseline (suspect some CKD), improved with IVF  Daily CMP  Avoid nephrotoxins.   Renally dose all medications   Monitor events that may lead to decreased renal perfusion (hypovolemia, hypotension, sepsis).   Monitor urine output (goal 0.5 mL/kg/hr) to assure that no obstruction precipitates worsening in GFR.

## 2023-04-02 NOTE — TRANSFER OF CARE
"Anesthesia Transfer of Care Note    Patient: Juan Pablo Chang    Procedure(s) Performed: Procedure(s) (LRB):  AMPUTATION, FOOT, TRANSMETATARSAL (Right)    Patient location: PACU    Anesthesia Type: MAC    Transport from OR: Transported from OR on room air with adequate spontaneous ventilation    Post pain: adequate analgesia    Post assessment: no apparent anesthetic complications and tolerated procedure well    Post vital signs: stable    Level of consciousness: awake, alert and oriented    Nausea/Vomiting: no nausea/vomiting    Complications: none    Transfer of care protocol was followed      Last vitals:   Visit Vitals  /60   Pulse 80   Temp 36.6 °C (97.9 °F)   Resp 18   Ht 5' 8" (1.727 m)   Wt 69 kg (152 lb 1.9 oz)   SpO2 (!) 94%   BMI 23.13 kg/m²     "

## 2023-04-02 NOTE — NURSING
Called Dr. Marsh as on call for cardiology consultation.  Dr. Marsh stated he would be right over.

## 2023-04-02 NOTE — SUBJECTIVE & OBJECTIVE
Interval History: Worsening cough overnight, started on symptomatic treatment.  States he continues to have pain in his feet that he attributes to neuropathy, restarted gabapentin and added taylor prn.  Spiked fever again this am while on antibiotics. CT foot concerning for abscess and osteo (no gas), podiatry evaluated patient this am with plan to take to surgery today for source control.  Troponin trended up, EKG with stable TWI, cards consulted to come evaluate patient.       Review of Systems  Objective:     Vital Signs (Most Recent):  Temp: 97.9 °F (36.6 °C) (04/02/23 0910)  Pulse: 79 (04/02/23 0910)  Resp: 16 (04/02/23 0910)  BP: (!) 121/56 (04/02/23 0736)  SpO2: (!) 94 % (04/02/23 0910)   Vital Signs (24h Range):  Temp:  [97.8 °F (36.6 °C)-101.7 °F (38.7 °C)] 97.9 °F (36.6 °C)  Pulse:  [] 79  Resp:  [16-19] 16  SpO2:  [93 %-97 %] 94 %  BP: (121-160)/(56-76) 121/56     Weight: 69 kg (152 lb 1.9 oz)  Body mass index is 23.13 kg/m².    Intake/Output Summary (Last 24 hours) at 4/2/2023 1009  Last data filed at 4/2/2023 0914  Gross per 24 hour   Intake 2799.71 ml   Output 2000 ml   Net 799.71 ml      Physical Exam  Vitals reviewed  General: NAD, Well developed, Well Nourished  Head: NC/AT  Eyes: EOMI, JAY  Cardiovascular: Pulses intact distally, Regular Rate and rhythm  Pulmonary: Normal Respiratory Rate, frequent dry cough with mild O2 sats, able to speak in full sentences on RA  Gi: Soft, Non-tender  Extremities: Warm, surgical stump from RGT amputation well healed, right second toe ecchymotic, swollen, erythematous, no crepitus or bullae on palpation, callus to 2nd met head unroofed to reveal plantar ulcer that tracks distally  Skin: Warm, dry  Neuro: Alert, Oriented x3, No focal Deficit  Psych: Appropriate mood and affect        Significant Labs: All pertinent labs within the past 24 hours have been reviewed.    Significant Imaging: I have reviewed all pertinent imaging results/findings within the past 24  hours.

## 2023-04-02 NOTE — OP NOTE
[facility] - Surgery  Operative Note     SUMMARY     Surgery Date: 4/2/2023       Pre-op Diagnosis:  Type 2 diabetes mellitus with hypoglycemia without coma, without long-term current use of insulin [E11.649]  Chronic multifocal osteomyelitis of right foot [M86.371]    Post-op Diagnosis:  Post-Op Diagnosis Codes:     * Type 2 diabetes mellitus with hypoglycemia without coma, without long-term current use of insulin [E11.649]     * Chronic multifocal osteomyelitis of right foot [M86.371]    Procedure(s) (LRB):  AMPUTATION, FOOT, TRANSMETATARSAL (Right)  Transmetatarsal amputation right foot procedure code 97845  Rotational skin flap for primary closure following amputation right foot procedure code 86045  Surgeon(s) and Role:     * Orion Donaldson DPM - Primary      Estimated Blood Loss:  20 cc  Hemostasis:  Ankle tourniquet placed at 250 mmHg for a period of 30 minutes    Anesthesia:  LMA in conjunction with local infiltrate equaling 30 cc of 1% lidocaine plain  Injectables 30 cc of 0.5% Marcaine plain  Materials sterile irrigant 2.0 nylon # 2 Nylon bone wax Surgicel powder  Pathology bone and soft tissue consistent with forefoot right  Condition stable  Complications none    Description of the findings of the procedure:  Type 2 diabetes mellitus with hypoglycemia without coma, without long-term current use of insulin [E11.649]  Chronic multifocal osteomyelitis of right foot [M86.371]         Specimens (From admission, onward)       Start     Ordered    04/02/23 1122  Specimen to Pathology, Surgery Other (Podiatry)  Once        Comments: Pre-op Diagnosis: Type 2 diabetes mellitus with hypoglycemia without coma, without long-term current use of insulin [E11.649]Chronic multifocal osteomyelitis of right foot [M86.371]Procedure(s):AMPUTATION, FOOT, TRANSMETATARSAL Number of specimens: 1Name of specimens: 1. Right Forefoot Osteomyelitis     References:    Click here for ordering Quick Tip   Question Answer Comment    Procedure Type: Other Podiatry   Which provider would you like to cc? DENI WESTBROOK    Release to patient Immediate        04/02/23 1125                     Procedure:  Patient can and placed in supine position on the OR table attention was then directed towards the patient's right ankle where Webril cast padding was placed on the patient's right ankle as was an ankle tourniquet.  Following this patient was locally anesthetized in a regional block of the forefoot right utilizing a total of 30 cc of 1% lidocaine plain.  Patient's foot was then prepped and draped in the usual sterile manner patient's foot was not exsanguinated utilizing an Esmarch bandage rather the ankle tourniquet was raised to 250 mmHg.  An initial incision was created overlying the plantar ulceration upon making the incision copious amounts of purulent drainage emitted from the area approximately 15 cc of purulent drainage came out of this area initially a deep culture and sensitivity both aerobic and anaerobic were performed even with the patient on the antibiotics any additional information as to bacterial infection will be helpful following this the area was evaluated there was significant amount of nonviable liquified necrotic tissue in this area therefore it was deemed necessary to proceed with a transmetatarsal amputation as there was significant tracking both dorsally medial and lateral from the plantar ulceration.  Because of the amount of tracking including all the way to the 4th metatarsal the transmetatarsal amputation was necessary a large fishmouth incision was created to create a dorsal and plantar flap of viable tissue for primary closure following dissection down to the metatarsals a sagittal saw was used to resect the metatarsals as far proximal as deemed appropriate of the 1st through 5th metatarsals of the right foot the infected bone and soft tissue of the forefoot was removed and sent to pathology for further  evaluation following bony resection utilizing the sagittal saw any visualized vessels were cauterized utilizing the Bovie a Pulsavac was used to thoroughly flush and irrigate the surgical site bone wax was applied to the area where the bone had been resected of each of the metatarsals to control bleeding the tourniquet was subsequently lowered and the harmonic focus was used to cauterize in control areas of bleeding Surgicel powder was also utilized to help control bleeding at the surgical site.  Once bleeding had been well-controlled I did fabricate a rotational skin flap utilizing both a dorsal and plantar skin flap to facilitate primary closure at the site of amputation.  The skin flap itself was approximately 12 cm long by 4 cm wide and was utilized for primary closure utilizing simple interrupted sutures of 2.0 nylon and # 2 Nylon.  Complete closure was achieved with minimal tension on the incision site dog ears both medially and laterally were resected to allow for primary closure.  Following this patient was injected with additional 30 cc of 0.5% Marcaine plain to create a long-term postoperative anesthetic.  The patient's right foot was then cleaned with sterile saline thoroughly dried Adaptic nonadhesive dressing was applied over the incision as were sterile 4x4s multiple ABDs multiple rolls of Kerlix and a lightly applied Ace bandage.  Patient left the operating room with vital signs stable and patient is vascular status having return to his preoperative levels.  Patient understands he will be completely nonweightbearing for the next 4 weeks and must keep the dressing dry and intact I anticipate patient being ready for discharge in the next 24-48 hours depending his progress I will follow up with the patient for initial postoperative dressing changes as an inpatient than subsequent follow-up care in clinic.  Separate evaluation and management was performed initially today to evaluate the patient for a new  patient inpatient consultation before making the decision to pursue surgery due to the severity of his current infection.  This note was created using [employer] voice recognition software that occasionally misinterpreted phrases or words.

## 2023-04-02 NOTE — ASSESSMENT & PLAN NOTE
Repeat trop did increase overnight, patient remains asymptomatic   Repeat EKG with stable TWI and non pathologic q waves per my read, no STEMI criteria  TTE ordered and pending  Cards consulted, appreciate recs  May need stress

## 2023-04-02 NOTE — CONSULTS
Consults  [facility] Intensive Care  Podiatry  H&P    Patient Name: Juan Pablo Chang  MRN: [id number]  Admission Date: 3/31/2023  Attending Physician: Lary Cabrera MD  Primary Care Provider: EDNA Butler     Subjective:     History of Present Illness: Patient presents today for an initial inpatient consultation.  Patient is a type 2 diabetic states he has been diabetic for 8 years he relates he is had neuropathy for about 6 years he is not having any pain or discomfort in his feet due to his neuropathy.  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  Patient relates having had a previous amputation of the right hallux approximately 2 years ago he states he developed bone infection and the toe had turned black when it was amputated.  Patient does not relate any specific episode of trauma to the right foot but states that he is constantly hitting his feet.    Scheduled Meds:   ceFEPime (MAXIPIME) IVPB  2 g Intravenous Q24H    clindamycin (CLEOCIN) IVPB  900 mg Intravenous Q8H    gabapentin  400 mg Oral BID    metronidazole  500 mg Intravenous Q8H    vancomycin (VANCOCIN) IVPB  750 mg Intravenous Q24H     Continuous Infusions:  PRN Meds:acetaminophen, dextrose 10%, dextrose 10%, dextrose, dextrose, guaiFENesin 100 mg/5 ml, insulin aspart U-100, naloxone, oxyCODONE, sodium chloride 0.9%, Pharmacy to dose Vancomycin consult **AND** vancomycin - pharmacy to dose    Review of patient's allergies indicates:  No Known Allergies     Past Medical History:   Diagnosis Date    Diabetes mellitus     Hypertension     Neuropathy      History reviewed. No pertinent surgical history.    Family History    None       Tobacco Use    Smoking status: Former    Smokeless tobacco: Current   Substance and Sexual Activity     Alcohol use: Not Currently    Drug use: Never    Sexual activity: Not on file     Review of Systems  Objective:     Vital Signs (Most Recent):  Temp: 97.9 °F (36.6 °C) (04/02/23 0910)  Pulse: 79 (04/02/23 0910)  Resp: 16 (04/02/23 0910)  BP: (!) 121/56 (04/02/23 0736)  SpO2: (!) 94 % (04/02/23 0910)   Vital Signs (24h Range):  Temp:  [97.8 °F (36.6 °C)-101.7 °F (38.7 °C)] 97.9 °F (36.6 °C)  Pulse:  [] 79  Resp:  [16-19] 16  SpO2:  [93 %-97 %] 94 %  BP: (121-160)/(56-76) 121/56     Weight: 69 kg (152 lb 1.9 oz)  Body mass index is 23.13 kg/m².    Foot Exam    Right Foot/Ankle     Inspection and Palpation  Skin Exam: blister, callus, maceration, ulcer and erythema;     Neurovascular  Dorsalis pedis: 2+  Posterior tibial: 1+      Left Foot/Ankle      Neurovascular  Dorsalis pedis: 2+  Posterior tibial: 1+      Physical Exam  Vitals and nursing note reviewed.   Constitutional:       Appearance: Normal appearance.   Cardiovascular:      Rate and Rhythm: Normal rate and regular rhythm.      Pulses:           Dorsalis pedis pulses are 2+ on the right side and 2+ on the left side.        Posterior tibial pulses are 1+ on the right side and 1+ on the left side.   Pulmonary:      Effort: Pulmonary effort is normal.   Musculoskeletal:         General: Swelling and signs of injury present.      Right lower leg: Edema present.      Right foot: Decreased range of motion. Deformity and prominent metatarsal heads present.      Left foot: Prominent metatarsal heads present.        Feet:    Feet:      Right foot:      Protective Sensation: 5 sites tested.  0 sites sensed.      Skin integrity: Ulcer, blister, skin breakdown, erythema, warmth and callus present.      Left foot:      Protective Sensation: 5 sites tested.  0 sites sensed.   Skin:     Capillary Refill: Capillary refill takes 2 to 3 seconds.      Findings: Erythema present.   Neurological:      Mental Status: He is alert.      Sensory: Sensory deficit present.    Psychiatric:         Mood and Affect: Mood normal.         Behavior: Behavior normal.         Laboratory:  All pertinent labs reviewed within the last 24 hours.    Diagnostic Results:  I have reviewed all pertinent imaging results/findings within the past 24 hours.  CT Foot Without Contrast Right  Order: [id number]  Status: Edited Result - FINAL     Visible to patient: No (inaccessible in Patient Portal)     Next appt: None     0 Result Notes  Details    Reading Physician Reading Date Result Priority   Kasi Lipscomb MD  [phone]  [phone] 4/2/2023 STAT     Addenda     Discrepant findings including concern for early osteomyelitis discussed with Dr. Cabrera via telephone at 08:50 a.m. on 04/02/2023.        Electronically signed by: Kasi Lipscomb  Date:                                            04/02/2023  Time:                                           08:56  Signed by Kasi Lipscomb MD on 4/2/2023 08:59  Narrative & Impression  EXAMINATION:  CT FOOT WITHOUT CONTRAST RIGHT     CLINICAL HISTORY:  Soft tissue mass, foot, deep;     TECHNIQUE:  Transaxial images through the right foot without the use of IV contrast.  Multiplanar reformats.     COMPARISON:  03/31/2023     FINDINGS:  There is a view taken of the great toe at the level of the metatarsal head.  There is dorsal dislocation of the 2nd toe MTP joint.  The 2nd MTP head as subtle cortical deformity and periostitis as can be seen sagittal series 6 images 74-77.  The 3rd metatarsal head and neck exhibit a subacute to chronic ununited fracture and there is slight widening of the 3rd MTP joint with lateral subluxation of the 3rd proximal phalanx.  Mild degenerative change at the 4th and 5th MTP joints.     Dorsal forefoot soft tissue swelling.  Focal fluid collection.  There is a focal fluid collection in the forefoot which interdigitates around several structures.  This surrounds the 2nd metatarsal head and also extends dorsal to the 3rd MTP  joint.  Size of the collection is 3.8 by 2.4 x 2.7 cm TV by AP by CC dimensions.  No gas in the collection.     There are skin ulcerations at a few sites along the plantar medial forefoot near the 1st and 2nd MTP joints.     Impression:     1. Thick walled focal fluid collection in the forefoot is concerning for abscess.  This directly apposes bone at the 2nd and 3rd MTP joints, with bony changes of both of these sites (periostitis 2nd metatarsal head and ununited fracture at 3rd metatarsal head).  Early osteomyelitis is not excluded.  2. Postsurgical change in the forefoot with multifocal malalignment.        Electronically signed by: Kasi Lipscomb  Date:                                            04/02/2023  Time:                                           08:52     Clinical Findings:                Assessment/Plan:     Active Diagnoses:    Diagnosis Date Noted POA    PRINCIPAL PROBLEM:  Cellulitis of right lower extremity [L03.115] 04/01/2023 Yes    Abscess of bursa of right foot [M71.071] 04/02/2023 Yes    Chronic multifocal osteomyelitis of right foot [M86.371] 04/02/2023 Yes    Elevated troponin [R77.8] 04/01/2023 Yes    Fever [R50.9] 04/01/2023 Yes    STERLING (acute kidney injury) [N17.9] 04/01/2023 Yes    Orthostatic hypotension [I95.1] 04/01/2023 Yes    Dislocated toe [S93.106A] 04/01/2023 Yes    Type 2 diabetes mellitus with hypoglycemia without coma, without long-term current use of insulin [E11.649] 04/01/2023 Yes    Multiple falls [R29.6] 04/01/2023 Not Applicable      Problems Resolved During this Admission:       Patient presents today for an initial inpatient consultation.  Patient is a type 2 diabetic states he has been diabetic for 8 years he relates he is had neuropathy for about 6 years he is not having any pain or discomfort in his feet due to his neuropathy.  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  Patient relates having had a previous amputation of the right hallux approximately 2 years ago he states he developed bone infection and the toe had turned black when it was amputated.  Patient does not relate any specific episode of trauma to the right foot but states that he is constantly hitting his feet.  A comprehensive new patient evaluation was performed today patient's pulses are palpable bilateral he does have significant cellulitis of the forefoot the focal point of infection is obviously the 2nd digit and the plantar forefoot underlying the 2nd metatarsal right.  No active drainage was noted plantar forefoot however the patient does have a significant ulceration that the hospitalist previously de roofed it is not actively draining but it is fluctuant on evaluation.  Patient's foot has gotten significantly worse in the past 24 hours his WBCs at the time of admission were 11.65 they have increased to 15.64 indicating advancement of infection even with antibiotic therapy.  Patient is currently on clindamycin Maxipime and vancomycin.  X-rays were evaluated patient's 2nd digit is completely dislocated and riding on the head of the 2nd metatarsal it is very likely the toes been dislocated causing plantar flexion of the 2nd metatarsal subsequent ulceration and infection and with the patient's neuropathy patient was unaware because he was not having any pain or discomfort until the foot became red and swollen.  CT was evaluated patient does have what appears to be a fluid collection in the plantar forefoot encompassing the 2nd metatarsal and 3rd metatarsal region with the likelihood of early osteomyelitis previously non healed fracture is noted of the 3rd metatarsal head.  Following the patient evaluation I had a lengthy discussion with the patient I also had poor performed a lengthy evaluation of  the patient's chart past medical history current medications I did advised the patient he is going to need surgical intervention at very minimum amputation of the 2nd digit incision and drainage of the plantar forefoot underlying the 2nd metatarsal however I explained to the patient depending upon the condition and status of the soft tissue and the abscess he may require a transmetatarsal amputation because of the extent of the infection certainly if it is spreading to surrounding metatarsals a trip and metatarsal amputation will be necessary the patient stated that he was fine with this and he wanted whenever needed to be done to resolve the infection performed he was made aware that he will be nonweightbearing for 4 weeks following this procedure whether it is an amputation of the digit an incision and drainage of the forefoot or a transmetatarsal amputation he must maintain nonweightbearing status in order for this area to heal properly especially with his past medical history of comorbidities.  Patient has been NPO since last night.  All aspects of surgery were discussed at length and in detail with the patient no guarantees were given written or implied all potential complications including but not limited to delayed healing nonhealing postoperative pain infection recurrence in the need for additional surgical intervention were discussed in detail patient did consent to partial amputation right foot as needed.  I did discuss the patient at length and in detail with hospitalist medicine today.  Hospitalist medicine felt that the patient was stable for surgery.  Patient will be taking to surgery today as this is an emergent situation with the patient's worsening condition.  Unrelated initial evaluation and management performed today separate from the surgical procedure that was determined necessary at the time of evaluation.  Patient will likely need at minimum several days in hospital with continued antibiotic  therapy and monitoring before discharge even though the patient is currently on antibiotic therapy I will perform a culture and sensitivity both aerobic and anaerobic at the time of surgery.  Total face-to-face time including discussion evaluation discussion of treatment plan treatment options surgical consultation preoperative history and physical review of the patient's chart including imaging studies equaled 60 minutes.  This note was created using [employer] voice recognition software that occasionally misinterpreted phrases or words.     Orion Donaldson DPM  Podiatry  [facility] - Intensive Care

## 2023-04-02 NOTE — CONSULTS
CARDIOLOGY CONSULTATION    Patient Name:  Juan Pablo Chang    :  1960    Medical Record:  [id number]    DATE OF SERVICE: 2023    ATTENDING PHYSICIAN: Lary Cabrera MD    REASON FOR CONSULT:   Increased troponin  HISTORY OF PRESENT ILLNESS  The patient is a 62 y.o. y/o male who is hospitalized for Chronic multifocal osteomyelitis of right foot.  Status post amputation of toes today.  No chest pain or shortness of breath.  Type 2 diabetes with neuropathy.  Increased troponin 0.58.  Concern for decreased sat on room air 94% on oxygen.  Renal failure BUN 21 creatinine 1.7 improved.  Anemia hemoglobin 9.6 .  Cardiology consult for increased troponin      PAST MEDICAL HISTORY  Past Medical History:   Diagnosis Date    Diabetes mellitus     Hypertension     Neuropathy        PAST SURGICAL HISTORY  History reviewed. No pertinent surgical history.    SOCIAL HISTORY   reports that he has quit smoking. He uses smokeless tobacco. He reports that he does not currently use alcohol. He reports that he does not use drugs.      FAMILY HISTORY  History reviewed. No pertinent family history.      ALLERGIES   Review of patient's allergies indicates:  No Known Allergies    HOME  MEDICATIONS  Prior to Admission medications    Medication Sig Start Date End Date Taking? Authorizing Provider   gabapentin (NEURONTIN) 800 MG tablet Take 800 mg by mouth 3 (three) times daily.   Yes Historical Provider   glipiZIDE (GLUCOTROL) 10 MG tablet Take 10 mg by mouth 2 (two) times daily before meals.   Yes Historical Provider   INV LISINOPRIL-HCTZ 20-25 Take 1 tablet by mouth once daily. FOR INVESTIGATIONAL USE ONLY   Yes Historical Provider   metFORMIN (GLUCOPHAGE) 1000 MG tablet Take 1,000 mg by mouth 2 (two) times daily with meals.   Yes Historical Provider   promethazine (PHENERGAN) 25 MG tablet Take 2 tablets (50 mg total) by mouth every 6 (six) hours as needed for Nausea. 20   Khanh Jorge MD     CURRENT MEDICATIONS    ceFEPime (MAXIPIME) IVPB  2 g Intravenous Q24H    gabapentin  400 mg Oral BID    LIDOcaine (PF) 10 mg/ml (1%)  1 mL Intradermal Once    metronidazole  500 mg Intravenous Q8H    vancomycin (VANCOCIN) IVPB  750 mg Intravenous Q24H     acetaminophen, dextrose 10%, dextrose 10%, dextrose, dextrose, diphenhydrAMINE, guaiFENesin 100 mg/5 ml, insulin aspart U-100, morphine, naloxone, ondansetron, oxyCODONE, sodium chloride 0.9%, Pharmacy to dose Vancomycin consult **AND** vancomycin - pharmacy to dose      REVIEW OF SYSTEMS  General: no weight loss, no fever, no chills, no anorexia  Skin: no rash  Eyes: no blurry vision  Ears/Nose/Throat: no nasal congestion, no sore throat  Respiratory: no cough, no dyspnea, no wheezing  Cardiovascular: no chest pain, no ankle swelling  Gastrointestinal: no nausea, no vomiting, no diarrhea, no constipation, no abdominal pain. No melena, no bright red blood per rectum  Genitourinary: no dysuria, no hematuria  Musculoskeletal: no joint pain, no myalgia, no muscle weakness  Neurologic: no seizures, no tremors, no fainting  Hematologic/Lymphatic: no abnormal bleeding, no abnormal bruising  Endocrine: no heat or cold intolerance, no polydipsia, no polyuria  Psychiatric: no anxiety, no depression  Allergy/Immunology: no seasonal allergies, no joint stiffness in morning      PHYSICAL EXAM  Vital Signs:  Temp:  [97.1 °F (36.2 °C)-101.7 °F (38.7 °C)] 97.1 °F (36.2 °C)  Pulse:  [] 73  Resp:  [10-20] 18  SpO2:  [93 %-97 %] 93 %  BP: (107-160)/(56-76) 110/64  Constitutional:  Healthy, no distress  HENT:  Normocephalic, Atraumatic, Bilateral external ears normal, Oropharynx moist, No oral exudates, No tenderness, neck supple.  Eyes:  PERRL, EOMI, Conjunctiva normal, No discharge.   Lymphatic:  No cervical or supraclavicular lymphadenopathy noted.   Cardiovascular:  Normal heart rate, Normal rhythm, 2/6 systolic ejection murmur left sternal border, No rubs, No gallops.   Respiratory:  Normal  breath sounds, No respiratory distress, No wheezing, No rhonchi, No rales, No chest tenderness.   GI:  Bowel sounds normal, Soft, No tenderness, No rebound or guarding, No masses.   Integument:  Warm, Dry, No erythema, No rash.   Musculoskeletal/Extremities:  Intact distal pulses, No edema, No tenderness, No cyanosis, No clubbing. Good range of motion in all major joints.  Right foot bandage  Neurologic:  Alert & oriented x 3, cranial nerves grossly intact, No focal deficits.    LABS    CBC  Recent Labs   Lab 03/31/23 1750 04/01/23 0435 04/02/23  0331   WBC 11.89 11.65 15.64*   RBC 3.58* 3.01* 3.28*   HGB 10.5* 8.8* 9.6*   HCT 31.5* 26.8* 28.8*   MCV 88 89 88   MCH 29.3 29.2 29.3   MCHC 33.3 32.8 33.3   RDW 13.1 13.1 13.1   MPV 10.7 11.1 11.0    237 300       Chemistry  Recent Labs   Lab 03/31/23 1750 04/01/23 0435 04/02/23  0331   * 140 140   K 4.4 4.0 4.4    109 110   CO2 24 21* 18*   BUN 32* 32* 21   CREATININE 2.3* 2.0* 1.7*   * 62* 128*   PROT 7.3  --  6.4   CALCIUM 9.2 8.1* 8.5*   BILITOT 0.8  --  0.7   AST 30  --  24   ALT 21  --  18       ABG  No results for input(s): PHART, MQB4RGE, PO2ART, NWH9HAR, G5TLGNBB, BEART, Y1HEYWTH in the last 168 hours.    IMAGING STUDIES & OTHER STUDIES  Reviewed          ASSESSMENT  Active Hospital Problems    Diagnosis  POA    *Chronic multifocal osteomyelitis of right foot [M86.371]  Yes    Abscess of bursa of right foot [M71.071]  Yes    Acute bronchitis [J20.9]  Yes    Cellulitis of right lower extremity [L03.115]  Yes    Elevated troponin [R77.8]  Yes    Fever [R50.9]  Yes    STERLING (acute kidney injury) [N17.9]  Yes    Orthostatic hypotension [I95.1]  Yes    Dislocated toe [S93.106A]  Yes    Cellulitis and abscess of toe of right foot [L03.031, L02.611]  Yes    Type 2 diabetes mellitus with hypoglycemia without coma, without long-term current use of insulin [E11.649]  Yes    Multiple falls [R29.6]  Not Applicable      Resolved Hospital Problems    No resolved problems to display.       PLAN  Elevated troponin  Increased troponin 0.58  No chest pain    Decreased saturation room air  Chest x-ray interstitial disease      Partial amputation of foot right  Dr. Donaldson  04/02/2023    Type 2 diabetes    Renal failure  BUN 21 creatinine 1.7 4/2    Anemia  Hemoglobin 9.6 4/2    Plan  EKG  Venous evaluate for DVT  Arterial Doppler pending  Lasix 20 mg p.o. x1  CBC CMP BNP Mag in a.m.    The plan was discussed with the patient and/or family.    Thank you for the Consult.    Electronically signed by: Rohit Marsh, 4/2/2023 12:54 PM     Time spent on counseling/coordination of care:   Total time spent with patient:

## 2023-04-02 NOTE — ASSESSMENT & PLAN NOTE
Time course of swollen foot/toe uncertain  BCx NGTD  Xrays with soft tissue swelling, questionable gas  Broad spectrum abx (vanc, cefepime renally dosed, flagyl) pending surgical wound and bone cultures  LINREC score 6 pts, intermediate risk, initially added clinda pending CT, now discontinued  CT foot with abscess, cellulitis, likely early osteo  To OR with podiatry today for source control (ray vs TMA)  Likely needs ID consult for extended abx recs   US Arterial BLE for vascular status

## 2023-04-02 NOTE — ASSESSMENT & PLAN NOTE
Flu negative in ED  Worsening cough, did desat but not requiring O2   Repeat CXR concerning for interstitial dx  Symptomatic treatment with duonebs and mucinex  On broad spectrum abx for foot infection, consider adding atypical coverage if respiratory sx worsen

## 2023-04-02 NOTE — PROGRESS NOTES
[facility] Medicine  Progress Note    Patient Name: Juan Pablo Chang  MRN: [id number]  Patient Class: IP- Inpatient   Admission Date: 3/31/2023  Length of Stay: 0 days  Attending Physician: Lary Cabrera MD  Primary Care Provider: EDNA Butler        Subjective:     Principal Problem:Cellulitis and abscess of toe of right foot        HPI:  The patient is a 61 y/o male with PMH of T2DM, neuropathy, and HTN who presented with dizziness and falls. He states falling about 8 times today. He reports dizziness and balance issues over the past 4 weeks. On presentation to the ER he was found to be orthostatic. Patient also found to have 3rd metatarsal fracture and dislocation of the 2nd toe. The second toe also appears to be ecchymotic and dusky. He reports that the toe had been like this for several months. He also had a temp of 101 in the ER.       Overview/Hospital Course:  Admitted to hospital for orthostatic hypotension, elevated troponin, and multiple falls several times a day as well.  He had a mildly elvated troponin which down trended, TTE ordered and pending. On further questioning, patient is adamant he is falling because he is unstable on his feet, is not passing out, and denies chest pain.  He has history of diabetic foot infection s/p amputation of right great toe with diabetic neuropathy.  His 2nd toe found to be ecchymotic, swollen, warm to touch but not painful.  Had fever noted in ED visit, BCx drawn, lactate wnl, antibiotics deferred on admission. Xrays showed 2nd digit dislocation as well as soft tissue swelling and questionable gas concerning for possible cellulitis or early necrotizing fasciitis.  Orthopedic surgery consulted in am, recommended IV abx, MRI, and vascular surgery evaluation for possible deep space infection and surgical debridement/amputation.  Obtained inflammatory markers, which were significantly elevated.  Started on broad spectrum antibiotics, CT foot  ordered to evaluate deep space infection, which showed abscess and possible early osteomyelitis, but no evidence of gas gangrene or necrotizing infection.  Continued to spike fevers while on antibiotics. Podiatry consulted for surgical evaluation and infection source control, and plan to take patient to OR today for ray vs TMA.  ABIs and US arterial ordered for vascular status but has easily palpable pulses.  Did complain of increased cough overnight, repeat CXR concerning for pulmonary interstitial disease, started on breathing treatments and mucinex.  Troponin rechecked overnight and increased with stable TWI on EKG, denied any new chest pain, cardiology consulted.        Interval History: Worsening cough overnight, started on symptomatic treatment.  States he continues to have pain in his feet that he attributes to neuropathy, restarted gabapentin and added taylor prn.  Spiked fever again this am while on antibiotics. CT foot concerning for abscess and osteo (no gas), podiatry evaluated patient this am with plan to take to surgery today for source control.  Troponin trended up, EKG with stable TWI, cards consulted to come evaluate patient.       Review of Systems  Objective:     Vital Signs (Most Recent):  Temp: 97.9 °F (36.6 °C) (04/02/23 0910)  Pulse: 79 (04/02/23 0910)  Resp: 16 (04/02/23 0910)  BP: (!) 121/56 (04/02/23 0736)  SpO2: (!) 94 % (04/02/23 0910)   Vital Signs (24h Range):  Temp:  [97.8 °F (36.6 °C)-101.7 °F (38.7 °C)] 97.9 °F (36.6 °C)  Pulse:  [] 79  Resp:  [16-19] 16  SpO2:  [93 %-97 %] 94 %  BP: (121-160)/(56-76) 121/56     Weight: 69 kg (152 lb 1.9 oz)  Body mass index is 23.13 kg/m².    Intake/Output Summary (Last 24 hours) at 4/2/2023 1009  Last data filed at 4/2/2023 0914  Gross per 24 hour   Intake 2799.71 ml   Output 2000 ml   Net 799.71 ml      Physical Exam  Vitals reviewed  General: NAD, Well developed, Well Nourished  Head: NC/AT  Eyes: EOMI, JAY  Cardiovascular: Pulses intact  distally, Regular Rate and rhythm  Pulmonary: Normal Respiratory Rate, frequent dry cough with mild O2 sats, able to speak in full sentences on RA  Gi: Soft, Non-tender  Extremities: Warm, surgical stump from RGT amputation well healed, right second toe ecchymotic, swollen, erythematous, no crepitus or bullae on palpation, callus to 2nd met head unroofed to reveal plantar ulcer that tracks distally  Skin: Warm, dry  Neuro: Alert, Oriented x3, No focal Deficit  Psych: Appropriate mood and affect        Significant Labs: All pertinent labs within the past 24 hours have been reviewed.    Significant Imaging: I have reviewed all pertinent imaging results/findings within the past 24 hours.      Assessment/Plan:      * Cellulitis and abscess of toe of right foot  Time course of swollen foot/toe uncertain  BCx NGTD  Xrays with soft tissue swelling, questionable gas  Broad spectrum abx (vanc, cefepime renally dosed, flagyl) pending surgical wound and bone cultures  LINREC score 6 pts, intermediate risk, initially added clinda pending CT, now discontinued  CT foot with abscess, cellulitis, likely early osteo  To OR with podiatry today for source control (ray vs TMA)  Likely needs ID consult for extended abx recs   US Arterial BLE for vascular status    Acute bronchitis  Flu negative in ED  Worsening cough, did desat but not requiring O2   Repeat CXR concerning for interstitial dx  Symptomatic treatment with duonebs and mucinex  On broad spectrum abx for foot infection, consider adding atypical coverage if respiratory sx worsen      Multiple falls  Orthostasis improved.   Suspect mechanical component due to neuropathy and RGT amputation   NWB RLE  PT/OT eval on Monday      Type 2 diabetes mellitus with hypoglycemia without coma, without long-term current use of insulin  Patient's FSGs are controlled on current medication regimen.  Last A1c reviewed-   Lab Results   Component Value Date    HGBA1C 6.5 (H) 03/31/2023     Most  recent fingerstick glucose reviewed-   Recent Labs   Lab 04/01/23  1114 04/01/23  1623 04/01/23 2028 04/02/23  0723   POCTGLUCOSE 124* 111* 186* 140*     Current correctional scale  Low  Maintain anti-hyperglycemic dose as follows-   Antihyperglycemics (From admission, onward)    Start     Stop Route Frequency Ordered    04/01/23 1214  insulin aspart U-100 pen 0-5 Units         -- SubQ Before meals & nightly PRN 04/01/23 1115        Hold Oral hypoglycemics while patient is in the hospital.    Not requiring any insulin    Dislocated toe  See cellulitis/abscess      Orthostatic hypotension  Possibly in setting of sepsis/infection   Now improved after IVF  Hold lisinopril-HCTZ, restart as able with STERLING  Telemetry     STERLING (acute kidney injury)  Lab Results   Component Value Date    CREATININE 1.7 (H) 04/02/2023     Sr Cr elevated, unknown baseline (suspect some CKD), improved with IVF  Daily CMP  Avoid nephrotoxins.   Renally dose all medications   Monitor events that may lead to decreased renal perfusion (hypovolemia, hypotension, sepsis).   Monitor urine output (goal 0.5 mL/kg/hr) to assure that no obstruction precipitates worsening in GFR.          Fever  See cellulitis management      Elevated troponin  Repeat trop did increase overnight, patient remains asymptomatic   Repeat EKG with stable TWI and non pathologic q waves per my read, no STEMI criteria  TTE ordered and pending  Cards consulted, appreciate recs  May need stress      NSTEMI (non-ST elevated myocardial infarction)          VTE Risk Mitigation (From admission, onward)    None          Discharge Planning   MARION:      Code Status: Full Code   Is the patient medically ready for discharge?:     Reason for patient still in hospital (select all that apply): Treatment           Lary Cabrera MD  Department of Hospital Medicine   [facility] Intensive Care

## 2023-04-02 NOTE — NURSING
Complete bath and linen changed. Urinated in the bed. Labs obtained and sent to lab.  Trop 0.348--Notified Dr CAMILA Anderson. EKG obtained. Orders Received.

## 2023-04-02 NOTE — ASSESSMENT & PLAN NOTE
Orthostasis improved.   Suspect mechanical component due to neuropathy and RGT amputation   NWB RLE  PT/OT eval on Monday

## 2023-04-03 DIAGNOSIS — S98.911D AMPUTATION OF RIGHT FOOT, SUBSEQUENT ENCOUNTER: Primary | ICD-10-CM

## 2023-04-03 PROBLEM — J96.01 ACUTE RESPIRATORY FAILURE WITH HYPOXIA: Status: ACTIVE | Noted: 2023-04-03

## 2023-04-03 LAB
ALBUMIN SERPL BCP-MCNC: 2.1 G/DL (ref 3.5–5.2)
ALBUMIN SERPL BCP-MCNC: 2.1 G/DL (ref 3.5–5.2)
ALP SERPL-CCNC: 55 U/L (ref 55–135)
ALP SERPL-CCNC: 55 U/L (ref 55–135)
ALT SERPL W/O P-5'-P-CCNC: 15 U/L (ref 10–44)
ALT SERPL W/O P-5'-P-CCNC: 15 U/L (ref 10–44)
ANION GAP SERPL CALC-SCNC: 11 MMOL/L (ref 8–16)
ANION GAP SERPL CALC-SCNC: 8 MMOL/L (ref 8–16)
ANION GAP SERPL CALC-SCNC: 8 MMOL/L (ref 8–16)
AST SERPL-CCNC: 18 U/L (ref 10–40)
AST SERPL-CCNC: 18 U/L (ref 10–40)
BASOPHILS # BLD AUTO: 0.06 K/UL (ref 0–0.2)
BASOPHILS # BLD AUTO: 0.06 K/UL (ref 0–0.2)
BASOPHILS NFR BLD: 0.4 % (ref 0–1.9)
BASOPHILS NFR BLD: 0.4 % (ref 0–1.9)
BILIRUB SERPL-MCNC: 0.5 MG/DL (ref 0.1–1)
BILIRUB SERPL-MCNC: 0.5 MG/DL (ref 0.1–1)
BNP SERPL-MCNC: 667 PG/ML (ref 0–99)
BUN SERPL-MCNC: 22 MG/DL (ref 8–23)
BUN SERPL-MCNC: 22 MG/DL (ref 8–23)
BUN SERPL-MCNC: 24 MG/DL (ref 8–23)
CALCIUM SERPL-MCNC: 8.4 MG/DL (ref 8.7–10.5)
CHLORIDE SERPL-SCNC: 104 MMOL/L (ref 95–110)
CHLORIDE SERPL-SCNC: 109 MMOL/L (ref 95–110)
CHLORIDE SERPL-SCNC: 109 MMOL/L (ref 95–110)
CO2 SERPL-SCNC: 21 MMOL/L (ref 23–29)
CREAT SERPL-MCNC: 1.6 MG/DL (ref 0.5–1.4)
CREAT SERPL-MCNC: 1.6 MG/DL (ref 0.5–1.4)
CREAT SERPL-MCNC: 1.8 MG/DL (ref 0.5–1.4)
DIFFERENTIAL METHOD: ABNORMAL
DIFFERENTIAL METHOD: ABNORMAL
EOSINOPHIL # BLD AUTO: 0 K/UL (ref 0–0.5)
EOSINOPHIL # BLD AUTO: 0 K/UL (ref 0–0.5)
EOSINOPHIL NFR BLD: 0.3 % (ref 0–8)
EOSINOPHIL NFR BLD: 0.3 % (ref 0–8)
ERYTHROCYTE [DISTWIDTH] IN BLOOD BY AUTOMATED COUNT: 13.2 % (ref 11.5–14.5)
ERYTHROCYTE [DISTWIDTH] IN BLOOD BY AUTOMATED COUNT: 13.2 % (ref 11.5–14.5)
EST. GFR  (NO RACE VARIABLE): 41.5 ML/MIN/1.73 M^2
EST. GFR  (NO RACE VARIABLE): 47.8 ML/MIN/1.73 M^2
EST. GFR  (NO RACE VARIABLE): 47.8 ML/MIN/1.73 M^2
GLUCOSE SERPL-MCNC: 155 MG/DL (ref 70–110)
GLUCOSE SERPL-MCNC: 155 MG/DL (ref 70–110)
GLUCOSE SERPL-MCNC: 225 MG/DL (ref 70–110)
HCT VFR BLD AUTO: 24.9 % (ref 40–54)
HCT VFR BLD AUTO: 24.9 % (ref 40–54)
HGB BLD-MCNC: 8.1 G/DL (ref 14–18)
HGB BLD-MCNC: 8.1 G/DL (ref 14–18)
IMM GRANULOCYTES # BLD AUTO: 0.08 K/UL (ref 0–0.04)
IMM GRANULOCYTES # BLD AUTO: 0.08 K/UL (ref 0–0.04)
IMM GRANULOCYTES NFR BLD AUTO: 0.6 % (ref 0–0.5)
IMM GRANULOCYTES NFR BLD AUTO: 0.6 % (ref 0–0.5)
LYMPHOCYTES # BLD AUTO: 1 K/UL (ref 1–4.8)
LYMPHOCYTES # BLD AUTO: 1 K/UL (ref 1–4.8)
LYMPHOCYTES NFR BLD: 7.1 % (ref 18–48)
LYMPHOCYTES NFR BLD: 7.1 % (ref 18–48)
MAGNESIUM SERPL-MCNC: 1.8 MG/DL (ref 1.6–2.6)
MAGNESIUM SERPL-MCNC: 1.8 MG/DL (ref 1.6–2.6)
MCH RBC QN AUTO: 28.6 PG (ref 27–31)
MCH RBC QN AUTO: 28.6 PG (ref 27–31)
MCHC RBC AUTO-ENTMCNC: 32.5 G/DL (ref 32–36)
MCHC RBC AUTO-ENTMCNC: 32.5 G/DL (ref 32–36)
MCV RBC AUTO: 88 FL (ref 82–98)
MCV RBC AUTO: 88 FL (ref 82–98)
MONOCYTES # BLD AUTO: 1.2 K/UL (ref 0.3–1)
MONOCYTES # BLD AUTO: 1.2 K/UL (ref 0.3–1)
MONOCYTES NFR BLD: 8.4 % (ref 4–15)
MONOCYTES NFR BLD: 8.4 % (ref 4–15)
NEUTROPHILS # BLD AUTO: 12.1 K/UL (ref 1.8–7.7)
NEUTROPHILS # BLD AUTO: 12.1 K/UL (ref 1.8–7.7)
NEUTROPHILS NFR BLD: 83.2 % (ref 38–73)
NEUTROPHILS NFR BLD: 83.2 % (ref 38–73)
NRBC BLD-RTO: 0 /100 WBC
NRBC BLD-RTO: 0 /100 WBC
PLATELET # BLD AUTO: 318 K/UL (ref 150–450)
PLATELET # BLD AUTO: 318 K/UL (ref 150–450)
PMV BLD AUTO: 11.1 FL (ref 9.2–12.9)
PMV BLD AUTO: 11.1 FL (ref 9.2–12.9)
POCT GLUCOSE: 127 MG/DL (ref 70–110)
POCT GLUCOSE: 143 MG/DL (ref 70–110)
POCT GLUCOSE: 240 MG/DL (ref 70–110)
POTASSIUM SERPL-SCNC: 4.2 MMOL/L (ref 3.5–5.1)
POTASSIUM SERPL-SCNC: 4.6 MMOL/L (ref 3.5–5.1)
POTASSIUM SERPL-SCNC: 4.6 MMOL/L (ref 3.5–5.1)
PROT SERPL-MCNC: 6 G/DL (ref 6–8.4)
PROT SERPL-MCNC: 6 G/DL (ref 6–8.4)
RBC # BLD AUTO: 2.83 M/UL (ref 4.6–6.2)
RBC # BLD AUTO: 2.83 M/UL (ref 4.6–6.2)
SODIUM SERPL-SCNC: 136 MMOL/L (ref 136–145)
SODIUM SERPL-SCNC: 138 MMOL/L (ref 136–145)
SODIUM SERPL-SCNC: 138 MMOL/L (ref 136–145)
TROPONIN I SERPL DL<=0.01 NG/ML-MCNC: 0.92 NG/ML (ref 0–0.03)
VANCOMYCIN TROUGH SERPL-MCNC: 8.7 UG/ML (ref 10–22)
WBC # BLD AUTO: 14.51 K/UL (ref 3.9–12.7)
WBC # BLD AUTO: 14.51 K/UL (ref 3.9–12.7)

## 2023-04-03 PROCEDURE — 25000003 PHARM REV CODE 250: Performed by: STUDENT IN AN ORGANIZED HEALTH CARE EDUCATION/TRAINING PROGRAM

## 2023-04-03 PROCEDURE — 99233 SBSQ HOSP IP/OBS HIGH 50: CPT | Mod: ,,, | Performed by: STUDENT IN AN ORGANIZED HEALTH CARE EDUCATION/TRAINING PROGRAM

## 2023-04-03 PROCEDURE — 20000000 HC ICU ROOM

## 2023-04-03 PROCEDURE — 80202 ASSAY OF VANCOMYCIN: CPT | Performed by: PODIATRIST

## 2023-04-03 PROCEDURE — 94761 N-INVAS EAR/PLS OXIMETRY MLT: CPT

## 2023-04-03 PROCEDURE — 25000003 PHARM REV CODE 250: Performed by: INTERNAL MEDICINE

## 2023-04-03 PROCEDURE — 21400001 HC TELEMETRY ROOM

## 2023-04-03 PROCEDURE — 83735 ASSAY OF MAGNESIUM: CPT | Mod: 91 | Performed by: STUDENT IN AN ORGANIZED HEALTH CARE EDUCATION/TRAINING PROGRAM

## 2023-04-03 PROCEDURE — 27000221 HC OXYGEN, UP TO 24 HOURS

## 2023-04-03 PROCEDURE — 99233 PR SUBSEQUENT HOSPITAL CARE,LEVL III: ICD-10-PCS | Mod: ,,, | Performed by: STUDENT IN AN ORGANIZED HEALTH CARE EDUCATION/TRAINING PROGRAM

## 2023-04-03 PROCEDURE — 83735 ASSAY OF MAGNESIUM: CPT | Performed by: INTERNAL MEDICINE

## 2023-04-03 PROCEDURE — 84484 ASSAY OF TROPONIN QUANT: CPT | Performed by: STUDENT IN AN ORGANIZED HEALTH CARE EDUCATION/TRAINING PROGRAM

## 2023-04-03 PROCEDURE — 85025 COMPLETE CBC W/AUTO DIFF WBC: CPT | Performed by: PODIATRIST

## 2023-04-03 PROCEDURE — 63600175 PHARM REV CODE 636 W HCPCS: Performed by: INTERNAL MEDICINE

## 2023-04-03 PROCEDURE — 25000003 PHARM REV CODE 250: Performed by: PODIATRIST

## 2023-04-03 PROCEDURE — 83880 ASSAY OF NATRIURETIC PEPTIDE: CPT | Performed by: INTERNAL MEDICINE

## 2023-04-03 PROCEDURE — 99024 PR POST-OP FOLLOW-UP VISIT: ICD-10-PCS | Mod: ,,, | Performed by: PODIATRIST

## 2023-04-03 PROCEDURE — 36415 COLL VENOUS BLD VENIPUNCTURE: CPT | Performed by: STUDENT IN AN ORGANIZED HEALTH CARE EDUCATION/TRAINING PROGRAM

## 2023-04-03 PROCEDURE — 99024 POSTOP FOLLOW-UP VISIT: CPT | Mod: ,,, | Performed by: PODIATRIST

## 2023-04-03 PROCEDURE — 80048 BASIC METABOLIC PNL TOTAL CA: CPT | Mod: XB | Performed by: STUDENT IN AN ORGANIZED HEALTH CARE EDUCATION/TRAINING PROGRAM

## 2023-04-03 PROCEDURE — 63600175 PHARM REV CODE 636 W HCPCS: Performed by: PODIATRIST

## 2023-04-03 PROCEDURE — 80053 COMPREHEN METABOLIC PANEL: CPT | Performed by: PODIATRIST

## 2023-04-03 PROCEDURE — 63600175 PHARM REV CODE 636 W HCPCS: Performed by: STUDENT IN AN ORGANIZED HEALTH CARE EDUCATION/TRAINING PROGRAM

## 2023-04-03 RX ORDER — METRONIDAZOLE 250 MG/1
TABLET ORAL
Status: COMPLETED
Start: 2023-04-03 | End: 2023-04-04

## 2023-04-03 RX ORDER — MAGNESIUM SULFATE 1 G/100ML
1 INJECTION INTRAVENOUS ONCE
Status: COMPLETED | OUTPATIENT
Start: 2023-04-03 | End: 2023-04-03

## 2023-04-03 RX ORDER — FAMOTIDINE 20 MG/1
20 TABLET, FILM COATED ORAL DAILY
Status: DISCONTINUED | OUTPATIENT
Start: 2023-04-03 | End: 2023-04-07 | Stop reason: HOSPADM

## 2023-04-03 RX ORDER — HEPARIN SODIUM 5000 [USP'U]/ML
5000 INJECTION, SOLUTION INTRAVENOUS; SUBCUTANEOUS ONCE
Status: COMPLETED | OUTPATIENT
Start: 2023-04-03 | End: 2023-04-03

## 2023-04-03 RX ORDER — MUPIROCIN 20 MG/G
OINTMENT TOPICAL 2 TIMES DAILY
Status: DISCONTINUED | OUTPATIENT
Start: 2023-04-03 | End: 2023-04-07 | Stop reason: HOSPADM

## 2023-04-03 RX ORDER — FUROSEMIDE 10 MG/ML
40 INJECTION INTRAMUSCULAR; INTRAVENOUS ONCE
Status: COMPLETED | OUTPATIENT
Start: 2023-04-03 | End: 2023-04-03

## 2023-04-03 RX ADMIN — INSULIN ASPART 2 UNITS: 100 INJECTION, SOLUTION INTRAVENOUS; SUBCUTANEOUS at 04:04

## 2023-04-03 RX ADMIN — OXYCODONE 5 MG: 5 TABLET ORAL at 02:04

## 2023-04-03 RX ADMIN — MUPIROCIN: 20 OINTMENT TOPICAL at 08:04

## 2023-04-03 RX ADMIN — INSULIN ASPART 1 UNITS: 100 INJECTION, SOLUTION INTRAVENOUS; SUBCUTANEOUS at 09:04

## 2023-04-03 RX ADMIN — OXYCODONE 5 MG: 5 TABLET ORAL at 10:04

## 2023-04-03 RX ADMIN — METRONIDAZOLE 500 MG: 250 TABLET ORAL at 03:04

## 2023-04-03 RX ADMIN — GABAPENTIN 400 MG: 100 CAPSULE ORAL at 09:04

## 2023-04-03 RX ADMIN — METRONIDAZOLE 500 MG: 250 TABLET ORAL at 05:04

## 2023-04-03 RX ADMIN — CEFEPIME 2 G: 2 INJECTION, POWDER, FOR SOLUTION INTRAVENOUS at 09:04

## 2023-04-03 RX ADMIN — FAMOTIDINE 20 MG: 20 TABLET ORAL at 02:04

## 2023-04-03 RX ADMIN — GABAPENTIN 400 MG: 100 CAPSULE ORAL at 08:04

## 2023-04-03 RX ADMIN — MUPIROCIN: 20 OINTMENT TOPICAL at 09:04

## 2023-04-03 RX ADMIN — FUROSEMIDE 40 MG: 10 INJECTION, SOLUTION INTRAMUSCULAR; INTRAVENOUS at 09:04

## 2023-04-03 RX ADMIN — OXYCODONE 5 MG: 5 TABLET ORAL at 05:04

## 2023-04-03 RX ADMIN — MAGNESIUM SULFATE IN DEXTROSE 1 G: 10 INJECTION, SOLUTION INTRAVENOUS at 08:04

## 2023-04-03 RX ADMIN — HEPARIN SODIUM 5000 UNITS: 5000 INJECTION, SOLUTION INTRAVENOUS; SUBCUTANEOUS at 02:04

## 2023-04-03 RX ADMIN — VANCOMYCIN HYDROCHLORIDE 1000 MG: 1 INJECTION, POWDER, LYOPHILIZED, FOR SOLUTION INTRAVENOUS at 07:04

## 2023-04-03 RX ADMIN — METRONIDAZOLE 500 MG: 250 TABLET ORAL at 09:04

## 2023-04-03 RX ADMIN — MAGNESIUM SULFATE IN DEXTROSE 1 G: 10 INJECTION, SOLUTION INTRAVENOUS at 09:04

## 2023-04-03 NOTE — ASSESSMENT & PLAN NOTE
Repeat trop did increase overnight, patient remains asymptomatic   Repeat EKG with stable TWI and non pathologic q waves per my read, no STEMI criteria  TTE ordered and pending  Cards consulted, appreciate recs

## 2023-04-03 NOTE — NURSING
1700  Dr. Donaldson at bedside doing 1st post-op dressing change.  He took pics of wound for EMR.  Wound is clean and dry with approximated nylon sutures.  Dr. Donaldson no longer feels ice packs are necessary.  Non-weight bearing is to continue until further notice.  Informed Dr. Donaldson we are considering rehab in which he was pleased.  Dr. Donaldson interested in a rollator and crutches for patient. Dr. Donaldson does not want patient to put ointment on wound but keep as dry as possible.

## 2023-04-03 NOTE — PROGRESS NOTES
[facility] Medicine  Progress Note    Patient Name: Juan Pablo Chang  MRN: [id number]  Patient Class: IP- Inpatient   Admission Date: 3/31/2023  Length of Stay: 1 days  Attending Physician: Devin Baker MD  Primary Care Provider: EDNA Butler        Subjective:     Principal Problem:Chronic multifocal osteomyelitis of right foot        HPI:  The patient is a 61 y/o male with PMH of T2DM, neuropathy, and HTN who presented with dizziness and falls. He states falling about 8 times today. He reports dizziness and balance issues over the past 4 weeks. On presentation to the ER he was found to be orthostatic. Patient also found to have 3rd metatarsal fracture and dislocation of the 2nd toe. The second toe also appears to be ecchymotic and dusky. He reports that the toe had been like this for several months. He also had a temp of 101 in the ER.       Overview/Hospital Course:  Admitted to hospital for orthostatic hypotension, elevated troponin, and multiple falls several times a day as well.  He had a mildly elvated troponin which down trended, TTE ordered and pending. On further questioning, patient is adamant he is falling because he is unstable on his feet, is not passing out, and denies chest pain.  He has history of diabetic foot infection s/p amputation of right great toe with diabetic neuropathy.  His 2nd toe found to be ecchymotic, swollen, warm to touch but not painful.  Had fever noted in ED visit, BCx drawn, lactate wnl, antibiotics deferred on admission. Xrays showed 2nd digit dislocation as well as soft tissue swelling and questionable gas concerning for possible cellulitis or early necrotizing fasciitis.  Orthopedic surgery consulted in am, recommended IV abx, MRI, and vascular surgery evaluation for possible deep space infection and surgical debridement/amputation.  Obtained inflammatory markers, which were significantly elevated.  Started on broad spectrum antibiotics, CT  foot ordered to evaluate deep space infection, which showed abscess and possible early osteomyelitis, but no evidence of gas gangrene or necrotizing infection.  Continued to spike fevers while on antibiotics. Podiatry consulted for surgical evaluation and infection source control, and plan to take patient to OR today for ray vs TMA.  ABIs and US arterial ordered for vascular status but has easily palpable pulses.  Did complain of increased cough overnight, repeat CXR concerning for pulmonary interstitial disease, started on breathing treatments and mucinex.  Troponin rechecked overnight and increased with stable TWI on EKG, denied any new chest pain, cardiology consulted.        Interval History: Febrile overnight but otherwise stable. Requiring 3L LFNC at rest to maintain O2 saturations >92%. BNP elevated. 40 IV lasix ordered. Cardiology following. TTE completed. Awaiting arterial duplex.    Review of Systems   All other systems reviewed and are negative.  Objective:     Vital Signs (Most Recent):  Temp: 99.1 °F (37.3 °C) (04/03/23 0706)  Pulse: 80 (04/03/23 0706)  Resp: (!) 21 (04/03/23 1049)  BP: 135/64 (04/03/23 0706)  SpO2: (!) 94 % (04/03/23 0840)   Vital Signs (24h Range):  Temp:  [96.8 °F (36 °C)-101.8 °F (38.8 °C)] 99.1 °F (37.3 °C)  Pulse:  [71-93] 80  Resp:  [10-21] 21  SpO2:  [90 %-97 %] 94 %  BP: (104-166)/(57-78) 135/64     Weight: 69 kg (152 lb 1.9 oz)  Body mass index is 23.13 kg/m².    Intake/Output Summary (Last 24 hours) at 4/3/2023 1056  Last data filed at 4/3/2023 0827  Gross per 24 hour   Intake 1727.74 ml   Output 1500 ml   Net 227.74 ml      Physical Exam  Vitals reviewed  General: NAD, Well developed, Well Nourished  Head: NC/AT  Eyes: EOMI, JAY  Cardiovascular: Pulses intact distally, Regular Rate and rhythm  Pulmonary: Normal Respiratory Rate, No respiratory distress  Gi: Soft, Non-tender  Extremities: Warm, No edema present, right foot wrapped with surgical dressing  Skin: Warm,  dry  Neuro: Alert, No focal Deficit  Psych: Appropriate mood and affect      Significant Labs: All pertinent labs within the past 24 hours have been reviewed.    Significant Imaging: I have reviewed all pertinent imaging results/findings within the past 24 hours.      Assessment/Plan:      * Cellulitis and abscess of toe of right foot  S/P AMPUTATION, FOOT, TRANSMETATARSAL (Right) for acute on chronic osteomelitis  BCx NGTD  Xrays with soft tissue swelling, questionable gas  Broad spectrum abx (vanc, cefepime renally dosed, flagyl) pending surgical wound and bone cultures  LINREC score 6 pts, intermediate risk, initially added clinda pending CT, now discontinued  CT foot with abscess, cellulitis, likely early osteo  US Arterial BLE for vascular status    Acute respiratory failure with hypoxia  Acute hypoxic respiratory failure likely 2/2 volume overload  BNP elevated and patient w/ O2 requirement  TTE pending  Continue IV lasix for goal net negative 1.5-2L daily  K>4, Mg>2  Continuous tele w/ pulse ox  Wean O2 as tolerated to maintain O2>92    Acute bronchitis  Flu negative in ED  Worsening cough, did desat but not requiring O2   Repeat CXR concerning for interstitial dx  Symptomatic treatment with duonebs and mucinex  On broad spectrum abx for foot infection, consider adding atypical coverage if respiratory sx worsen      Multiple falls  Orthostasis improved.   Suspect mechanical component due to neuropathy and RGT amputation   NWB RLE  PT/OT eval on Monday      Type 2 diabetes mellitus with hypoglycemia without coma, without long-term current use of insulin  Patient's FSGs are controlled on current medication regimen.  Last A1c reviewed-   Lab Results   Component Value Date    HGBA1C 6.5 (H) 03/31/2023     Most recent fingerstick glucose reviewed-   Recent Labs   Lab 04/01/23  1114 04/01/23  1623 04/01/23 2028 04/02/23  0723   POCTGLUCOSE 124* 111* 186* 140*     Current correctional scale  Low  Maintain  anti-hyperglycemic dose as follows-   Antihyperglycemics (From admission, onward)    Start     Stop Route Frequency Ordered    04/01/23 1214  insulin aspart U-100 pen 0-5 Units         -- SubQ Before meals & nightly PRN 04/01/23 1115        Hold Oral hypoglycemics while patient is in the hospital.    Not requiring any insulin    Dislocated toe  See cellulitis/abscess      Orthostatic hypotension  Possibly in setting of sepsis/infection   Now improved after IVF  Hold lisinopril-HCTZ, restart as able with STERLING  Telemetry     STERLING (acute kidney injury)  Lab Results   Component Value Date    CREATININE 1.6 (H) 04/03/2023    CREATININE 1.6 (H) 04/03/2023     Sr Cr elevated, unknown baseline (suspect some CKD), improved with IVF  Daily CMP  Avoid nephrotoxins.   Renally dose all medications   Monitor events that may lead to decreased renal perfusion (hypovolemia, hypotension, sepsis).   Monitor urine output (goal 0.5 mL/kg/hr) to assure that no obstruction precipitates worsening in GFR.          Fever  See cellulitis management      Elevated troponin  Repeat trop did increase overnight, patient remains asymptomatic   Repeat EKG with stable TWI and non pathologic q waves per my read, no STEMI criteria  TTE ordered and pending  Cards consulted, appreciate recs      NSTEMI (non-ST elevated myocardial infarction)          VTE Risk Mitigation (From admission, onward)         Ordered     heparin (porcine) injection 5,000 Units  Once         04/03/23 1049                Discharge Planning   MARION:      Code Status: Full Code   Is the patient medically ready for discharge?:     Reason for patient still in hospital (select all that apply): Treatment                     Devin Baker MD  Department of Hospital Medicine   [place] - Intensive Care

## 2023-04-03 NOTE — PLAN OF CARE
Problem: Adult Inpatient Plan of Care  Goal: Plan of Care Review  Outcome: Ongoing, Progressing  Goal: Patient-Specific Goal (Individualized)  Outcome: Ongoing, Progressing  Goal: Absence of Hospital-Acquired Illness or Injury  Outcome: Ongoing, Progressing  Goal: Optimal Comfort and Wellbeing  Outcome: Ongoing, Progressing  Goal: Readiness for Transition of Care  Outcome: Ongoing, Progressing     Problem: Impaired Wound Healing  Goal: Optimal Wound Healing  Outcome: Ongoing, Progressing     Problem: Infection  Goal: Absence of Infection Signs and Symptoms  Outcome: Ongoing, Progressing     Problem: Fluid and Electrolyte Imbalance (Acute Kidney Injury/Impairment)  Goal: Fluid and Electrolyte Balance  Outcome: Ongoing, Progressing     Problem: Oral Intake Inadequate (Acute Kidney Injury/Impairment)  Goal: Optimal Nutrition Intake  Outcome: Ongoing, Progressing     Problem: Renal Function Impairment (Acute Kidney Injury/Impairment)  Goal: Effective Renal Function  Outcome: Ongoing, Progressing     Problem: Diabetes Comorbidity  Goal: Blood Glucose Level Within Targeted Range  Outcome: Ongoing, Progressing     Problem: Fall Injury Risk  Goal: Absence of Fall and Fall-Related Injury  Outcome: Ongoing, Progressing     Problem: Skin Injury Risk Increased  Goal: Skin Health and Integrity  Outcome: Ongoing, Progressing

## 2023-04-03 NOTE — PLAN OF CARE
[facility] Intensive Care  Initial Discharge Assessment       Primary Care Provider: EDNA Butler    Admission Diagnosis: Orthostatic hypotension [I95.1]  Trauma [T14.90XA]  SOB (shortness of breath) [R06.02]  Elevated troponin [R77.8]  Fever [R50.9]  Toe dislocation, right, initial encounter [S93.104A]  Other physeal fracture of right metatarsal, initial encounter for closed fracture [S99.191A]    Admission Date: 3/31/2023  Expected Discharge Date:    completed assessment with patient who was alert and oriented. Patient's brother, John Chang was present at bedside. Patient does not use any equipment at home to assist with ADLs. Before admit into hospital he was moving in with his brother, John Chang. Patient and his brother discussed him going to a rehab facility upon discharge. Patient is in agreement. He has [place] Medicaid. His PCP is EDNA Arias. Patient denies any additional barriers at this time. Case management will continue to follow.    Discharge Barriers Identified: None    Payor: [place] [employer]ICA[employer] / Plan: [place] [employer] / Product Type: Government /     Extended Emergency Contact Information  Primary Emergency Contact: Earlene Chang  Mobile Phone: [phone]  Relation: Brother   needed? No    Discharge Plan A: Rehab  Discharge Plan B: Home with family      [id number] [place] [id number] [address]  [address] [address]  Phone: [phone] Fax: [phone]      Initial Assessment (most recent)       Adult Discharge Assessment - 04/03/23 1105          Discharge Assessment    Assessment Type Discharge Planning Assessment     Confirmed/corrected address, phone number and insurance Yes     Confirmed Demographics Correct on Facesheet     Source of Information patient;family     Does patient/caregiver understand observation status Yes     Communicated MARION with patient/caregiver Date not  available/Unable to determine     Reason For Admission orthostatic hypotension     People in Home sibling(s);alone     Do you expect to return to your current living situation? Yes     Do you have help at home or someone to help you manage your care at home? Yes     Who are your caregiver(s) and their phone number(s)? John scott [phone]     Prior to hospitilization cognitive status: Alert/Oriented     Current cognitive status: Alert/Oriented     Equipment Currently Used at Home none     Readmission within 30 days? No     Patient currently being followed by outpatient case management? No     Do you currently have service(s) that help you manage your care at home? No     Do you take prescription medications? Yes     Who is going to help you get home at discharge? John scott [phone]     How do you get to doctors appointments? family or friend will provide     Are you on dialysis? No     Do you take coumadin? No     Discharge Plan A Rehab     Discharge Plan B Home with family     DME Needed Upon Discharge  none     Discharge Plan discussed with: Sibling;Patient     Name(s) and Number(s) John scott [phone]     Discharge Barriers Identified None        Physical Activity    On average, how many days per week do you engage in moderate to strenuous exercise (like a brisk walk)? 5 days     On average, how many minutes do you engage in exercise at this level? 30 min        Financial Resource Strain    How hard is it for you to pay for the very basics like food, housing, medical care, and heating? Patient refused        Housing Stability    In the last 12 months, was there a time when you were not able to pay the mortgage or rent on time? Patient refused        Transportation Needs    In the past 12 months, has lack of transportation kept you from medical appointments or from getting medications? No     In the past 12 months, has lack of transportation kept you from  meetings, work, or from getting things needed for daily living? No        Food Insecurity    Within the past 12 months, you worried that your food would run out before you got the money to buy more. Patient refused     Within the past 12 months, the food you bought just didn't last and you didn't have money to get more. Patient refused        Social Connections    How often do you get together with friends or relatives? More than three times a week     How often do you attend Confucianist or Orthodoxy services? Never     Do you belong to any clubs or organizations such as Confucianist groups, unions, fraternal or athletic groups, or school groups? No     How often do you attend meetings of the clubs or organizations you belong to? Never     Are you , , , , never , or living with a partner? Patient refused        Alcohol Use    Q1: How often do you have a drink containing alcohol? Patient refused     Q2: How many drinks containing alcohol do you have on a typical day when you are drinking? Patient refused     Q3: How often do you have six or more drinks on one occasion? Patient refused        OTHER    Name(s) of People in Home brotherJohn [phone]                                 27-Feb-2020

## 2023-04-03 NOTE — PROGRESS NOTES
[facility] Intensive Care  Podiatry  Progress Note    Patient Name: Juan Pablo Chang  MRN: [id number]  Admission Date: 3/31/2023  Hospital Length of Stay: 1 days  Attending Physician: Devin Baker MD  Primary Care Provider: EDNA Butler     Subjective:     Interval History:  Patient presents status post transmetatarsal amputation right.  Postop day 1.    Follow-up For: Procedure(s) (LRB):  AMPUTATION, FOOT, TRANSMETATARSAL (Right)    Post-Operative Day: 1 Day Post-Op    Scheduled Meds:   ceFEPime (MAXIPIME) IVPB  2 g Intravenous Q24H    famotidine  20 mg Oral Daily    gabapentin  400 mg Oral BID    metroNIDAZOLE  500 mg Oral Q8H    mupirocin   Nasal BID    vancomycin (VANCOCIN) IVPB  750 mg Intravenous Q24H     Continuous Infusions:  PRN Meds:acetaminophen, dextrose 10%, dextrose 10%, dextrose, dextrose, guaiFENesin 100 mg/5 ml, insulin aspart U-100, naloxone, oxyCODONE, sodium chloride 0.9%, Pharmacy to dose Vancomycin consult **AND** vancomycin - pharmacy to dose    Review of Systems  Objective:     Vital Signs (Most Recent):  Temp: 98.8 °F (37.1 °C) (04/03/23 1608)  Pulse: 79 (04/03/23 1608)  Resp: 18 (04/03/23 1608)  BP: (!) 113/54 (04/03/23 1608)  SpO2: (!) 92 % (04/03/23 1608)   Vital Signs (24h Range):  Temp:  [96.8 °F (36 °C)-101.8 °F (38.8 °C)] 98.8 °F (37.1 °C)  Pulse:  [73-93] 79  Resp:  [18-21] 18  SpO2:  [90 %-97 %] 92 %  BP: (113-166)/(54-78) 113/54     Weight: 68.9 kg (152 lb)  Body mass index is 23.11 kg/m².    Foot Exam    Laboratory:  All pertinent labs reviewed within the last 24 hours.    Diagnostic Results:  None    Clinical Findings:                  Assessment/Plan:     Active Diagnoses:    Diagnosis Date Noted POA    PRINCIPAL PROBLEM:  Cellulitis and abscess of toe of right foot [L03.031, L02.611] 04/01/2023 Yes    Acute respiratory failure with hypoxia [J96.01] 04/03/2023 No    Abscess of bursa of right foot [M71.071] 04/02/2023 Yes    Chronic multifocal osteomyelitis of right foot  [M86.371] 04/02/2023 Yes    Acute bronchitis [J20.9] 04/02/2023 Yes    Cellulitis of right lower extremity [L03.115] 04/02/2023 Yes    Elevated troponin [R77.8] 04/01/2023 Yes    Fever [R50.9] 04/01/2023 Yes    STERLING (acute kidney injury) [N17.9] 04/01/2023 Yes    Orthostatic hypotension [I95.1] 04/01/2023 Yes    Dislocated toe [S93.106A] 04/01/2023 Yes    Type 2 diabetes mellitus with hypoglycemia without coma, without long-term current use of insulin [E11.649] 04/01/2023 Yes    Multiple falls [R29.6] 04/01/2023 Not Applicable      Problems Resolved During this Admission:       Patient presents status post transmetatarsal amputation right.  Postop day 1.  Patient is resting comfortably with no significant pain or discomfort.  On evaluation the patient's dressing is dry and intact upon removal there is no active bleeding there is minimal blood on the patient's dressing no drainage patient has little to no edema and no erythema noted of the patient's right foot at the surgical site.  Patient was advised everything looks very good he still has ice and has been elevating I am going to discontinue the ice at this time however I do want the patient to elevate to control the swelling clearly this has made a significant difference in the postoperative inflammation.  Patient's WBCs are still elevated at 14.51 I do expect a significant decrease over the next 24 hours postop.  A new well-padded thick protective dressing was applied to the right lower extremity I did explain to the patient at length that he needs to maintain complete nonweightbearing status on the right lower extremity for likely 4 weeks and then will allow him to start weight-bearing he will likely be able to get the area wet at that time in the meantime the patient is to keep the dressing dry and intact.  Nothing was applied to the incision as the areas completely dry the dressing consisted of sterile 4x4s multiple ABDs multiple rolls of Kerlix and a lightly  applied Ace bandage placed overlying the dressing.  Patient is currently awaiting placement in rehab he has agreed to rehab I do feel that this would be the best for the patient I have put in orders for a knee scooter or a roll later walker with the use of crutches to aid the patient and nonweightbearing.  Because of the patient's minimal inflammation no drainage no moisture on the incision the dressing does not need to be changed tomorrow I will change the dressing in 48 hours assuming the patient is still here is not been discharged to rehab otherwise I will follow up with him in clinic as appropriate.  Patient does have culture and sensitivity both aerobic and anaerobic pending this was taken at the time of surgery even with the patient on antibiotic therapy.  Continue current antibiotic therapy pending culture and sensitivity results will follow.This note was created using [employer] voice recognition software that occasionally misinterpreted phrases or words.     Orion Donaldson DPM  Podiatry  [place] - Intensive Care

## 2023-04-03 NOTE — ASSESSMENT & PLAN NOTE
S/P AMPUTATION, FOOT, TRANSMETATARSAL (Right) for acute on chronic osteomelitis  BCx NGTD  Xrays with soft tissue swelling, questionable gas  Broad spectrum abx (vanc, cefepime renally dosed, flagyl) pending surgical wound and bone cultures  LINREC score 6 pts, intermediate risk, initially added clinda pending CT, now discontinued  CT foot with abscess, cellulitis, likely early osteo  US Arterial BLE for vascular status

## 2023-04-03 NOTE — ASSESSMENT & PLAN NOTE
Lab Results   Component Value Date    CREATININE 1.6 (H) 04/03/2023    CREATININE 1.6 (H) 04/03/2023     Sr Cr elevated, unknown baseline (suspect some CKD), improved with IVF  Daily CMP  Avoid nephrotoxins.   Renally dose all medications   Monitor events that may lead to decreased renal perfusion (hypovolemia, hypotension, sepsis).   Monitor urine output (goal 0.5 mL/kg/hr) to assure that no obstruction precipitates worsening in GFR.         (3) slightly limited

## 2023-04-03 NOTE — ASSESSMENT & PLAN NOTE
Acute hypoxic respiratory failure likely 2/2 volume overload  BNP elevated and patient w/ O2 requirement  TTE pending  Continue IV lasix for goal net negative 1.5-2L daily  K>4, Mg>2  Continuous tele w/ pulse ox  Wean O2 as tolerated to maintain O2>92

## 2023-04-03 NOTE — SUBJECTIVE & OBJECTIVE
Interval History: Febrile overnight but otherwise stable. Requiring 3L LFNC at rest to maintain O2 saturations >92%. BNP elevated. 40 IV lasix ordered. Cardiology following. TTE completed. Awaiting arterial duplex.    Review of Systems   All other systems reviewed and are negative.  Objective:     Vital Signs (Most Recent):  Temp: 99.1 °F (37.3 °C) (04/03/23 0706)  Pulse: 80 (04/03/23 0706)  Resp: (!) 21 (04/03/23 1049)  BP: 135/64 (04/03/23 0706)  SpO2: (!) 94 % (04/03/23 0840)   Vital Signs (24h Range):  Temp:  [96.8 °F (36 °C)-101.8 °F (38.8 °C)] 99.1 °F (37.3 °C)  Pulse:  [71-93] 80  Resp:  [10-21] 21  SpO2:  [90 %-97 %] 94 %  BP: (104-166)/(57-78) 135/64     Weight: 69 kg (152 lb 1.9 oz)  Body mass index is 23.13 kg/m².    Intake/Output Summary (Last 24 hours) at 4/3/2023 1056  Last data filed at 4/3/2023 0827  Gross per 24 hour   Intake 1727.74 ml   Output 1500 ml   Net 227.74 ml      Physical Exam  Vitals reviewed  General: NAD, Well developed, Well Nourished  Head: NC/AT  Eyes: EOMI, JAY  Cardiovascular: Pulses intact distally, Regular Rate and rhythm  Pulmonary: Normal Respiratory Rate, No respiratory distress  Gi: Soft, Non-tender  Extremities: Warm, No edema present, right foot wrapped with surgical dressing  Skin: Warm, dry  Neuro: Alert, No focal Deficit  Psych: Appropriate mood and affect      Significant Labs: All pertinent labs within the past 24 hours have been reviewed.    Significant Imaging: I have reviewed all pertinent imaging results/findings within the past 24 hours.

## 2023-04-03 NOTE — PROGRESS NOTES
"CARDIOLOGY PROGRESS NOTE    Patient Name:  Juan Pablo Chang    :  1960    Medical Record:  [id number]    DATE OF SERVICE  4/3/2023        SUBJECTIVE  The patient is being seen for follow-up status post right foot partial amputation 1 day ago increased troponin no chest pain or shortness of breath sinus rhythm right bundle-branch block CHF       REVIEW OF SYSTEMS  General: No chills, No fever, No fatigue  Eyes: No vision changes, No drainage from eyes  ENT: No nasal congestion, no sore throat  Respiratory: No shortness of breath, No cough  Cardiac: No chest pain, palpitations, dyspnea, dizziness, claudication, or syncopal episodes  GI: No abdominal pain, no nausea, no vomiting  : No dysuria  Musculoskeletal: No joint pain  Neuro: No weakness, dizziness, vision changes       OBJECTIVE          PHYSICAL EXAM  Vital Signs:  /64 (BP Location: Left arm, Patient Position: Lying)   Pulse 80   Temp 99.1 °F (37.3 °C) (Oral)   Resp 19   Ht 5' 8" (1.727 m)   Wt 69 kg (152 lb 1.9 oz)   SpO2 (!) 94%   BMI 23.13 kg/m²   Temp:  [96.8 °F (36 °C)-101.8 °F (38.8 °C)] 99.1 °F (37.3 °C)  Pulse:  [71-93] 80  Resp:  [10-20] 19  SpO2:  [90 %-97 %] 94 %  BP: (104-166)/(57-78) 135/64      General:   Eyes: Anicteric sclera. Moist conjunctiva. Vision grossly intact.  HENMT: Normocephalic.  Moist mucous membranes. No obvious ulcerations.   Neck: Trachea midline.   Cardiovascular: Heart regular rate and rhythm. S1, S2, S3 2/6 systolic ejection murmur left sternal border Peripheral pulses palpable. No peripheral edema.   Respiratory: Lungs clear to auscultation bilaterally. No increased work of breathing.  Gastrointestinal: Bowel sounds active in all 4 quadrants. Soft, nontender, nondistended.   Genitourinary: Urine clear yellow  Musculoskeletal: Moves all extremities with equal strength.  Right foot bandage  Skin: Color normal for ethnicity. Skin warm and dry with good turgor. Capillary refill < 3 seconds.   Neurologic: " Oriented x 3.  Speech fluent, follows commands.  Psychiatric:  Awake and alert, normal affect mood good.      LABS    CBC  Recent Labs   Lab 04/01/23  0435 04/02/23  0331 04/03/23  0453   WBC 11.65 15.64* 14.51*  14.51*   RBC 3.01* 3.28* 2.83*  2.83*   HGB 8.8* 9.6* 8.1*  8.1*   HCT 26.8* 28.8* 24.9*  24.9*   MCV 89 88 88  88   MCH 29.2 29.3 28.6  28.6   MCHC 32.8 33.3 32.5  32.5   RDW 13.1 13.1 13.2  13.2   MPV 11.1 11.0 11.1  11.1    300 318  318       Chemistry  Recent Labs   Lab 03/31/23  1750 04/01/23 0435 04/02/23 0331 04/03/23 0453   * 140 140 138  138   K 4.4 4.0 4.4 4.6  4.6    109 110 109  109   CO2 24 21* 18* 21*  21*   BUN 32* 32* 21 22  22   CREATININE 2.3* 2.0* 1.7* 1.6*  1.6*   * 62* 128* 155*  155*   PROT 7.3  --  6.4 6.0  6.0   CALCIUM 9.2 8.1* 8.5* 8.4*  8.4*   BILITOT 0.8  --  0.7 0.5  0.5   AST 30  --  24 18  18   ALT 21  --  18 15  15       ABG  No results for input(s): PHART, MCL3NOL, PO2ART, KMC5XQC, Y4MNJYKV, BEART, K5PUBSMA in the last 168 hours.      MICROBIOLOGY  Reviewed    IMAGING & OTHER STUDIES  Reviewed      Intake/Output last 3 shifts:  I/O last 3 completed shifts:  In: 1577.7 [P.O.:600; I.V.:300; IV Piggyback:677.7]  Out: 2350 [Urine:2350]    Scheduled meds:   ceFEPime (MAXIPIME) IVPB  2 g Intravenous Q24H    gabapentin  400 mg Oral BID    magnesium sulfate IVPB  1 g Intravenous Once    metroNIDAZOLE  500 mg Oral Q8H    mupirocin   Nasal BID    vancomycin (VANCOCIN) IVPB  750 mg Intravenous Q24H       PRN Meds:  acetaminophen, dextrose 10%, dextrose 10%, dextrose, dextrose, guaiFENesin 100 mg/5 ml, insulin aspart U-100, naloxone, oxyCODONE, sodium chloride 0.9%, Pharmacy to dose Vancomycin consult **AND** vancomycin - pharmacy to dose    Continuous Infusions:      Dietary Orders:  [unfilled]     Admit weight: Weight: 65.8 kg (145 lb)   Today weight: Weight: 69 kg (152 lb 1.9 oz)      Length of stay in days:  1      ASSESSMENT    Active Hospital Problems    Diagnosis  POA    *Chronic multifocal osteomyelitis of right foot [M86.371]  Yes    Abscess of bursa of right foot [M71.071]  Yes    Acute bronchitis [J20.9]  Yes    Cellulitis of right lower extremity [L03.115]  Yes    Elevated troponin [R77.8]  Yes    Fever [R50.9]  Yes    STERLING (acute kidney injury) [N17.9]  Yes    Orthostatic hypotension [I95.1]  Yes    Dislocated toe [S93.106A]  Yes    Cellulitis and abscess of toe of right foot [L03.031, L02.611]  Yes    Type 2 diabetes mellitus with hypoglycemia without coma, without long-term current use of insulin [E11.649]  Yes    Multiple falls [R29.6]  Not Applicable      Resolved Hospital Problems   No resolved problems to display.          PLAN       Elevated troponin  Increased troponin 0.58  No chest pain  Sinus right bundle-branch block     Decreased saturation room air  Chest x-ray interstitial disease        Partial amputation of foot right  Dr. Donaldson  04/02/2023     Type 2 diabetes     Renal failure  BUN 22 creatinine 1.6 4/3     Anemia  Hemoglobin 8.1 4/2     Plan  Heparin 5000 units subQ x1  Monitor H&H  Pepcid 20 mg p.o. daily   Discussed with Dr. Baker 40 mg IV Lasix given today  CBC BMP in a.m.  Electronically signed by: Rohit Marsh, 4/3/2023 10:46 AM

## 2023-04-03 NOTE — NURSING
Awake, trying to use urinal.  Assisted to use urinal and instructed on non weight bearing to right lower extremity, ice packs and elevation of RLE.  Drsg to RLE intact, no drainage noted.  IV in L AC infusing without difficulty.  O2 at 3L per N/C instructed to cough, deep breath , non productive cough noted.  Urine dark yellow.  Cleaned table and urinals provided.  Bed alarm set for safety.  Instructed to call for any needs, c/o pain and assist as needed, voiced understanding.  Bed in low locked position with side rails up x2 and call light in reach.

## 2023-04-04 LAB
ANION GAP SERPL CALC-SCNC: 11 MMOL/L (ref 8–16)
AORTIC ROOT ANNULUS: 3.41 CM
AORTIC VALVE CUSP SEPERATION: 1.72 CM
ASCENDING AORTA: 2.75 CM
AV INDEX (PROSTH): 1.05
AV MEAN GRADIENT: 4 MMHG
AV PEAK GRADIENT: 7 MMHG
AV REGURGITATION PRESSURE HALF TIME: 628.64 MS
AV VALVE AREA: 2.94 CM2
AV VELOCITY RATIO: 1.03
BASOPHILS # BLD AUTO: 0.06 K/UL (ref 0–0.2)
BASOPHILS NFR BLD: 0.5 % (ref 0–1.9)
BSA FOR ECHO PROCEDURE: 1.82 M2
BUN SERPL-MCNC: 21 MG/DL (ref 8–23)
CALCIUM SERPL-MCNC: 8.4 MG/DL (ref 8.7–10.5)
CHLORIDE SERPL-SCNC: 105 MMOL/L (ref 95–110)
CO2 SERPL-SCNC: 21 MMOL/L (ref 23–29)
CREAT SERPL-MCNC: 1.6 MG/DL (ref 0.5–1.4)
CV ECHO LV RWT: 0.65 CM
DIFFERENTIAL METHOD: ABNORMAL
DOP CALC AO PEAK VEL: 1.29 M/S
DOP CALC AO VTI: 25.7 CM
DOP CALC LVOT AREA: 2.8 CM2
DOP CALC LVOT DIAMETER: 1.89 CM
DOP CALC LVOT PEAK VEL: 1.33 M/S
DOP CALC LVOT STROKE VOLUME: 75.43 CM3
DOP CALC MV VTI: 42.9 CM
DOP CALCLVOT PEAK VEL VTI: 26.9 CM
E WAVE DECELERATION TIME: 205.1 MSEC
E/A RATIO: 1.06
E/E' RATIO: 15.44 M/S
ECHO LV POSTERIOR WALL: 1.3 CM (ref 0.6–1.1)
EJECTION FRACTION: 55 %
EOSINOPHIL # BLD AUTO: 0.1 K/UL (ref 0–0.5)
EOSINOPHIL NFR BLD: 1.2 % (ref 0–8)
ERYTHROCYTE [DISTWIDTH] IN BLOOD BY AUTOMATED COUNT: 13.2 % (ref 11.5–14.5)
EST. GFR  (NO RACE VARIABLE): 47.8 ML/MIN/1.73 M^2
FRACTIONAL SHORTENING: 33 % (ref 28–44)
GLUCOSE SERPL-MCNC: 154 MG/DL (ref 70–110)
HCT VFR BLD AUTO: 26 % (ref 40–54)
HGB BLD-MCNC: 8.7 G/DL (ref 14–18)
IMM GRANULOCYTES # BLD AUTO: 0.09 K/UL (ref 0–0.04)
IMM GRANULOCYTES NFR BLD AUTO: 0.7 % (ref 0–0.5)
INTERVENTRICULAR SEPTUM: 1.27 CM (ref 0.6–1.1)
IVC DIAMETER: 1.86 CM
IVRT: 79.92 MSEC
LA MAJOR: 4.15 CM
LA MINOR: 3.21 CM
LEFT ATRIUM SIZE: 4.17 CM
LEFT INTERNAL DIMENSION IN SYSTOLE: 2.67 CM (ref 2.1–4)
LEFT VENTRICLE DIASTOLIC VOLUME INDEX: 38.72 ML/M2
LEFT VENTRICLE DIASTOLIC VOLUME: 70.47 ML
LEFT VENTRICLE MASS INDEX: 101 G/M2
LEFT VENTRICLE SYSTOLIC VOLUME INDEX: 14.4 ML/M2
LEFT VENTRICLE SYSTOLIC VOLUME: 26.27 ML
LEFT VENTRICULAR INTERNAL DIMENSION IN DIASTOLE: 4.01 CM (ref 3.5–6)
LEFT VENTRICULAR MASS: 183.98 G
LV LATERAL E/E' RATIO: 13.9 M/S
LV SEPTAL E/E' RATIO: 17.38 M/S
LVOT MG: 4.02 MMHG
LVOT MV: 0.94 CM/S
LYMPHOCYTES # BLD AUTO: 0.9 K/UL (ref 1–4.8)
LYMPHOCYTES NFR BLD: 7.8 % (ref 18–48)
MAGNESIUM SERPL-MCNC: 1.9 MG/DL (ref 1.6–2.6)
MCH RBC QN AUTO: 28.9 PG (ref 27–31)
MCHC RBC AUTO-ENTMCNC: 33.5 G/DL (ref 32–36)
MCV RBC AUTO: 86 FL (ref 82–98)
MONOCYTES # BLD AUTO: 1.1 K/UL (ref 0.3–1)
MONOCYTES NFR BLD: 8.7 % (ref 4–15)
MV MEAN GRADIENT: 4 MMHG
MV PEAK A VEL: 1.31 M/S
MV PEAK E VEL: 1.39 M/S
MV PEAK GRADIENT: 8 MMHG
MV STENOSIS PRESSURE HALF TIME: 64.76 MS
MV VALVE AREA BY CONTINUITY EQUATION: 1.76 CM2
MV VALVE AREA P 1/2 METHOD: 3.4 CM2
NEUTROPHILS # BLD AUTO: 9.7 K/UL (ref 1.8–7.7)
NEUTROPHILS NFR BLD: 81.1 % (ref 38–73)
NRBC BLD-RTO: 0 /100 WBC
PISA AR MAX VEL: 3.92 M/S
PISA MRMAX VEL: 4.19 M/S
PISA TR MAX VEL: 2.29 M/S
PLATELET # BLD AUTO: 354 K/UL (ref 150–450)
PMV BLD AUTO: 10.9 FL (ref 9.2–12.9)
POCT GLUCOSE: 156 MG/DL (ref 70–110)
POCT GLUCOSE: 164 MG/DL (ref 70–110)
POCT GLUCOSE: 234 MG/DL (ref 70–110)
POCT GLUCOSE: 250 MG/DL (ref 70–110)
POCT GLUCOSE: 252 MG/DL (ref 70–110)
POCT GLUCOSE: 253 MG/DL (ref 70–110)
POCT GLUCOSE: 363 MG/DL (ref 70–110)
POTASSIUM SERPL-SCNC: 4.3 MMOL/L (ref 3.5–5.1)
PV MEAN GRADIENT: 3 MMHG
PV MV: 0.86 M/S
PV PEAK VELOCITY: 1.07 CM/S
RA MAJOR: 3.62 CM
RA WIDTH: 2.68 CM
RBC # BLD AUTO: 3.01 M/UL (ref 4.6–6.2)
RIGHT VENTRICULAR END-DIASTOLIC DIMENSION: 2.89 CM
RIGHT VENTRICULAR LENGTH IN DIASTOLE (APICAL 4-CHAMBER VIEW): 5.65 CM
RV MID DIAMA: 1.81 CM
SODIUM SERPL-SCNC: 137 MMOL/L (ref 136–145)
STJ: 2.8 CM
TDI LATERAL: 0.1 M/S
TDI SEPTAL: 0.08 M/S
TDI: 0.09 M/S
TR MAX PG: 21 MMHG
TRICUSPID VALVE PEAK A WAVE VELOCITY: 0.61 M/S
TV PEAK E VEL: 0.6 M/S
WBC # BLD AUTO: 12.01 K/UL (ref 3.9–12.7)

## 2023-04-04 PROCEDURE — 25000003 PHARM REV CODE 250: Performed by: STUDENT IN AN ORGANIZED HEALTH CARE EDUCATION/TRAINING PROGRAM

## 2023-04-04 PROCEDURE — 99233 PR SUBSEQUENT HOSPITAL CARE,LEVL III: ICD-10-PCS | Mod: ,,, | Performed by: STUDENT IN AN ORGANIZED HEALTH CARE EDUCATION/TRAINING PROGRAM

## 2023-04-04 PROCEDURE — 97162 PT EVAL MOD COMPLEX 30 MIN: CPT

## 2023-04-04 PROCEDURE — 94761 N-INVAS EAR/PLS OXIMETRY MLT: CPT

## 2023-04-04 PROCEDURE — 25000003 PHARM REV CODE 250

## 2023-04-04 PROCEDURE — 20000000 HC ICU ROOM

## 2023-04-04 PROCEDURE — 97535 SELF CARE MNGMENT TRAINING: CPT

## 2023-04-04 PROCEDURE — 25000003 PHARM REV CODE 250: Performed by: PODIATRIST

## 2023-04-04 PROCEDURE — 80048 BASIC METABOLIC PNL TOTAL CA: CPT | Performed by: INTERNAL MEDICINE

## 2023-04-04 PROCEDURE — 63600175 PHARM REV CODE 636 W HCPCS: Performed by: STUDENT IN AN ORGANIZED HEALTH CARE EDUCATION/TRAINING PROGRAM

## 2023-04-04 PROCEDURE — 21400001 HC TELEMETRY ROOM

## 2023-04-04 PROCEDURE — 99233 SBSQ HOSP IP/OBS HIGH 50: CPT | Mod: ,,, | Performed by: STUDENT IN AN ORGANIZED HEALTH CARE EDUCATION/TRAINING PROGRAM

## 2023-04-04 PROCEDURE — 97166 OT EVAL MOD COMPLEX 45 MIN: CPT

## 2023-04-04 PROCEDURE — 85025 COMPLETE CBC W/AUTO DIFF WBC: CPT | Performed by: INTERNAL MEDICINE

## 2023-04-04 PROCEDURE — 83735 ASSAY OF MAGNESIUM: CPT | Performed by: STUDENT IN AN ORGANIZED HEALTH CARE EDUCATION/TRAINING PROGRAM

## 2023-04-04 PROCEDURE — 97530 THERAPEUTIC ACTIVITIES: CPT

## 2023-04-04 PROCEDURE — 25000003 PHARM REV CODE 250: Performed by: INTERNAL MEDICINE

## 2023-04-04 PROCEDURE — 63600175 PHARM REV CODE 636 W HCPCS: Performed by: PODIATRIST

## 2023-04-04 RX ORDER — MAGNESIUM SULFATE 1 G/100ML
1 INJECTION INTRAVENOUS ONCE
Status: COMPLETED | OUTPATIENT
Start: 2023-04-04 | End: 2023-04-04

## 2023-04-04 RX ORDER — LISINOPRIL 2.5 MG/1
5 TABLET ORAL DAILY
Status: DISCONTINUED | OUTPATIENT
Start: 2023-04-04 | End: 2023-04-06

## 2023-04-04 RX ORDER — HEPARIN SODIUM 5000 [USP'U]/ML
5000 INJECTION, SOLUTION INTRAVENOUS; SUBCUTANEOUS DAILY
Status: DISCONTINUED | OUTPATIENT
Start: 2023-04-05 | End: 2023-04-05

## 2023-04-04 RX ORDER — HEPARIN SODIUM 5000 [USP'U]/ML
5000 INJECTION, SOLUTION INTRAVENOUS; SUBCUTANEOUS EVERY 8 HOURS
Status: DISCONTINUED | OUTPATIENT
Start: 2023-04-04 | End: 2023-04-04

## 2023-04-04 RX ORDER — FUROSEMIDE 10 MG/ML
40 INJECTION INTRAMUSCULAR; INTRAVENOUS ONCE
Status: COMPLETED | OUTPATIENT
Start: 2023-04-04 | End: 2023-04-04

## 2023-04-04 RX ADMIN — INSULIN ASPART 2 UNITS: 100 INJECTION, SOLUTION INTRAVENOUS; SUBCUTANEOUS at 11:04

## 2023-04-04 RX ADMIN — VANCOMYCIN HYDROCHLORIDE 1000 MG: 1 INJECTION, POWDER, LYOPHILIZED, FOR SOLUTION INTRAVENOUS at 07:04

## 2023-04-04 RX ADMIN — METRONIDAZOLE 500 MG: 250 TABLET ORAL at 09:04

## 2023-04-04 RX ADMIN — METRONIDAZOLE 500 MG: 250 TABLET ORAL at 03:04

## 2023-04-04 RX ADMIN — FAMOTIDINE 20 MG: 20 TABLET ORAL at 09:04

## 2023-04-04 RX ADMIN — OXYCODONE 5 MG: 5 TABLET ORAL at 08:04

## 2023-04-04 RX ADMIN — INSULIN ASPART 3 UNITS: 100 INJECTION, SOLUTION INTRAVENOUS; SUBCUTANEOUS at 09:04

## 2023-04-04 RX ADMIN — MUPIROCIN: 20 OINTMENT TOPICAL at 09:04

## 2023-04-04 RX ADMIN — MUPIROCIN: 20 OINTMENT TOPICAL at 08:04

## 2023-04-04 RX ADMIN — INSULIN ASPART 3 UNITS: 100 INJECTION, SOLUTION INTRAVENOUS; SUBCUTANEOUS at 04:04

## 2023-04-04 RX ADMIN — LISINOPRIL 5 MG: 2.5 TABLET ORAL at 08:04

## 2023-04-04 RX ADMIN — FUROSEMIDE 40 MG: 10 INJECTION, SOLUTION INTRAMUSCULAR; INTRAVENOUS at 09:04

## 2023-04-04 RX ADMIN — OXYCODONE 5 MG: 5 TABLET ORAL at 06:04

## 2023-04-04 RX ADMIN — MAGNESIUM SULFATE IN DEXTROSE 1 G: 10 INJECTION, SOLUTION INTRAVENOUS at 09:04

## 2023-04-04 RX ADMIN — GABAPENTIN 400 MG: 100 CAPSULE ORAL at 08:04

## 2023-04-04 RX ADMIN — GABAPENTIN 400 MG: 100 CAPSULE ORAL at 09:04

## 2023-04-04 RX ADMIN — CEFEPIME 2 G: 2 INJECTION, POWDER, FOR SOLUTION INTRAVENOUS at 09:04

## 2023-04-04 RX ADMIN — METRONIDAZOLE 500 MG: 250 TABLET ORAL at 05:04

## 2023-04-04 NOTE — PROGRESS NOTES
[facility] Medicine  Progress Note    Patient Name: Juan Pablo Chang  MRN: [id number]  Patient Class: IP- Inpatient   Admission Date: 3/31/2023  Length of Stay: 2 days  Attending Physician: Devin Baker MD  Primary Care Provider: EDNA Butler        Subjective:     Principal Problem:Cellulitis and abscess of toe of right foot        HPI:  The patient is a 63 y/o male with PMH of T2DM, neuropathy, and HTN who presented with dizziness and falls. He states falling about 8 times today. He reports dizziness and balance issues over the past 4 weeks. On presentation to the ER he was found to be orthostatic. Patient also found to have 3rd metatarsal fracture and dislocation of the 2nd toe. The second toe also appears to be ecchymotic and dusky. He reports that the toe had been like this for several months. He also had a temp of 101 in the ER.       Overview/Hospital Course:      Interval History: Good UOP w/ lasix. O2 requirement decreasing. Continuing IV abx while cultures pending. Podiatry to re-evaluate tomorrow for dressing change. PT/OT consulted. Recommending IPR. Hopeful for medical clearance by Thursday.    Review of Systems   All other systems reviewed and are negative.  Objective:     Vital Signs (Most Recent):  Temp: 99 °F (37.2 °C) (04/04/23 1145)  Pulse: 78 (04/04/23 1125)  Resp: 17 (04/04/23 1125)  BP: (!) 155/68 (04/04/23 1125)  SpO2: (!) 93 % (04/04/23 1500)   Vital Signs (24h Range):  Temp:  [94 °F (34.4 °C)-99 °F (37.2 °C)] 99 °F (37.2 °C)  Pulse:  [76-87] 78  Resp:  [16-21] 17  SpO2:  [89 %-94 %] 93 %  BP: (113-180)/(54-79) 155/68     Weight: 68.9 kg (152 lb)  Body mass index is 23.11 kg/m².    Intake/Output Summary (Last 24 hours) at 4/4/2023 1536  Last data filed at 4/4/2023 1528  Gross per 24 hour   Intake 1194 ml   Output 2550 ml   Net -1356 ml      Physical Exam  Vitals reviewed  General: NAD, Well developed, Well Nourished  Head: NC/AT  Eyes: EOMI,  JAY  Cardiovascular: Pulses intact distally, Regular Rate and rhythm  Pulmonary: Normal Respiratory Rate, No respiratory distress  Gi: Soft, Non-tender  Extremities: Warm, No edema present, right foot wrapped with surgical dressing  Skin: Warm, dry  Neuro: Alert, No focal Deficit  Psych: Appropriate mood and affect    Significant Labs: All pertinent labs within the past 24 hours have been reviewed.    Significant Imaging: I have reviewed all pertinent imaging results/findings within the past 24 hours.      Assessment/Plan:      * Cellulitis and abscess of toe of right foot  S/P AMPUTATION, FOOT, TRANSMETATARSAL (Right) for acute on chronic osteomelitis  BCx NGTD  Xrays with soft tissue swelling, questionable gas  Broad spectrum abx (vanc, cefepime renally dosed, flagyl) pending surgical wound and bone cultures  LINREC score 6 pts, intermediate risk, initially added clinda pending CT, now discontinued  CT foot with abscess, cellulitis, likely early osteo  US Arterial BLE for vascular status    Acute respiratory failure with hypoxia  Acute hypoxic respiratory failure likely 2/2 volume overload  BNP elevated and patient w/ O2 requirement  TTE pending  Continue IV lasix for goal net negative 1.5-2L daily for today  K>4, Mg>2  Continuous tele w/ pulse ox  Wean O2 as tolerated to maintain O2>92    Acute bronchitis  Flu negative in ED  Worsening cough, did desat but not requiring O2   Repeat CXR concerning for interstitial dx  Symptomatic treatment with duonebs and mucinex  On broad spectrum abx for foot infection, consider adding atypical coverage if respiratory sx worsen      Multiple falls  Orthostasis improved.   Suspect mechanical component due to neuropathy and RGT amputation   NWB RLE  PT/OT eval on Monday      Type 2 diabetes mellitus with hypoglycemia without coma, without long-term current use of insulin  Patient's FSGs are controlled on current medication regimen.  Last A1c reviewed-   Lab Results   Component  Value Date    HGBA1C 6.5 (H) 03/31/2023     Most recent fingerstick glucose reviewed-   Recent Labs   Lab 04/01/23  1114 04/01/23  1623 04/01/23 2028 04/02/23  0723   POCTGLUCOSE 124* 111* 186* 140*     Current correctional scale  Low  Maintain anti-hyperglycemic dose as follows-   Antihyperglycemics (From admission, onward)    Start     Stop Route Frequency Ordered    04/01/23 1214  insulin aspart U-100 pen 0-5 Units         -- SubQ Before meals & nightly PRN 04/01/23 1115        Hold Oral hypoglycemics while patient is in the hospital.    Not requiring any insulin    Dislocated toe  See cellulitis/abscess      Orthostatic hypotension  Possibly in setting of sepsis/infection   Now improved after IVF  Hold lisinopril-HCTZ, restart as able with STERLING  Telemetry     STERLING (acute kidney injury)  Lab Results   Component Value Date    CREATININE 1.6 (H) 04/03/2023    CREATININE 1.6 (H) 04/03/2023     Sr Cr elevated, unknown baseline (suspect some CKD), improved with IVF  Daily CMP  Avoid nephrotoxins.   Renally dose all medications   Monitor events that may lead to decreased renal perfusion (hypovolemia, hypotension, sepsis).   Monitor urine output (goal 0.5 mL/kg/hr) to assure that no obstruction precipitates worsening in GFR.          Fever  See cellulitis management      Elevated troponin  Repeat trop did increase overnight, patient remains asymptomatic   Repeat EKG with stable TWI and non pathologic q waves per my read, no STEMI criteria  TTE ordered and pending  Cards consulted, appreciate recs      NSTEMI (non-ST elevated myocardial infarction)            VTE Risk Mitigation (From admission, onward)    None          Discharge Planning   MARION:      Code Status: Full Code   Is the patient medically ready for discharge?:     Reason for patient still in hospital (select all that apply): Treatment  Discharge Plan A: Rehab   Discharge Delays: None known at this time              Devin Bakre MD  Department of Hospital  [facility] Intensive Care

## 2023-04-04 NOTE — PT/OT/SLP EVAL
Physical Therapy Evaluation and Treatment    Patient Name: Juan Pablo Chang   MRN: [id number]  Recent Surgery: Procedure(s) (LRB):  AMPUTATION, FOOT, TRANSMETATARSAL (Right) 2 Days Post-Op    Recommendations:     Discharge Recommendations: rehabilitation facility   Discharge Equipment Recommendations:  (to be determined at next level of care)   Barriers to discharge: None    Assessment:   HPI:  The patient is a 63 y/o male with PMH of T2DM, neuropathy, and HTN who presented with dizziness and falls. He states falling about 8 times today. He reports dizziness and balance issues over the past 4 weeks. On presentation to the ER he was found to be orthostatic. Patient also found to have 3rd metatarsal fracture and dislocation of the 2nd toe. The second toe also appears to be ecchymotic and dusky. He reports that the toe had been like this for several months. He also had a temp of 101 in the ER.     Juan Pablo Chang is a 62 y.o. male admitted with a medical diagnosis of Cellulitis and abscess of toe of right foot. He presents with the following impairments/functional limitations: weakness, impaired endurance, impaired sensation, impaired self care skills, impaired functional mobility, gait instability, impaired balance, decreased lower extremity function, impaired coordination, orthopedic precautions.    Rehab Prognosis: Good; patient would benefit from acute PT services to address these deficits and reach maximum level of function.    Plan:     During this hospitalization, patient to be seen  (3-5 x/wk) to address the above listed problems via gait training, therapeutic activities, therapeutic exercises    Plan of Care Expires:  (upon discharge from facility)    Subjective     Chief Complaint: s/p transmetatarsal amputation right foot  Patient Comments/Goals: increase strength and functional mobility  Pain/Comfort:  Pain Rating 1: 0/10    Social History:  Living Environment: Patient lives alone in  [facility]  with number of  outside stair(s): 2  Prior Level of Function: Prior to admission, patient was independent  Equipment Used at Home: none  DME owned (not currently used): none  Assistance Upon Discharge: family    Objective:     Communicated with RN prior to session. Patient found HOB elevated with peripheral IV, SCD, telemetry upon PT entry to room.    General Precautions: Standard, fall   Orthopedic Precautions: RLE non weight bearing   Braces: N/A    Respiratory Status: Room air    Exams:  Cognition: Patient is oriented to Person, Place, Time, Situation  RLE ROM: WFL except right foot  RLE Strength: WFL except right foot  LLE ROM: WFL  LLE Strength: WFL  Gross Motor Coordination: impaired secondary to orthopedic precautions  Sensation:    -       Impaired  secondary to PN  Skin Integrity/Edema: -       surgical dressing and ace wrap on right foot intact with no breakthrough drainage noted    Functional Mobility:  Bed Mobility  Supine to Sit: minimum assistance for trunk management  Sit to Supine: minimum assistance for trunk management  Transfers  Sit to Stand: contact guard assistance with rolling walker and with cues for weight bearing precautions and hand placement/correct technique  Gait  Patient took 2 small side steps up toward HOB with rolling walker and minimum assistance. Patient demonstrates unsteady gait, decreased step length, and decreased foot clearance on left, verbal cueing for NWB RLE.   Balance  Sitting: stand by assistance  Standing:  CGA to min assist using RW    Therapeutic Activities and Exercises:   Patient educated on role of acute care PT and PT POC, safety while in hospital including calling nurse for mobility, call light usage, and NWB status of RLE  Elevation of RLE to decrease swelling    AM-PAC 6 CLICK MOBILITY  Total Score:     Patient left right sidelying with HOB elevated with all lines intact, call button in reach, and RN notified.    GOALS:   Mod I for bed mobility  Transfers with SBA using RW  and adhering to NWB RLE  Ambulate 10' with CGA using RW and adhering to NWB RLE      History:     Past Medical History:   Diagnosis Date    Diabetes mellitus     Hypertension     Neuropathy        Past Surgical History:   Procedure Laterality Date    FOOT AMPUTATION THROUGH METATARSAL Right 4/2/2023    Procedure: AMPUTATION, FOOT, TRANSMETATARSAL;  Surgeon: Orion Donaldson DPM;  Location: [facility];  Service: Podiatry;  Laterality: Right;       Time Tracking:     PT Received On: 04/04/23  PT Start Time: 1014  PT Stop Time: 1035  PT Total Time (min): 21 min     Billable Minutes: Evaluation 21 min    4/4/2023

## 2023-04-04 NOTE — PLAN OF CARE
04/04/23 1430   Post-Acute Status   Post-Acute Authorization Placement   Post-Acute Placement Status Pending payor review/awaiting authorization (if required)   Discharge Delays None known at this time   Discharge Plan   Discharge Plan A Rehab   Discharge Plan B Home with family     Tameka with [facility] here to evaluate patient. She spoke to her director who states they should be able to get him approved since still requiring oxygen. States she will submit referral to Medicaid & see what they decide. Will continue to follow for needs.

## 2023-04-04 NOTE — PLAN OF CARE
04/04/23 1100   Discharge Reassessment   Assessment Type Discharge Planning Reassessment   Did the patient's condition or plan change since previous assessment? No   Discharge Plan discussed with: Patient;Sibling   Name(s) and Number(s) Earlene Chang brother [phone]   Communicated MARION with patient/caregiver Yes   Discharge Plan A Rehab   Discharge Plan B Home with family   Discharge Barriers Identified None   Why the patient remains in the hospital Requires continued medical care   Post-Acute Status   Post-Acute Authorization Placement   Post-Acute Placement Status Referrals Sent   Discharge Delays None known at this time     Spoke to patient & his brother Earlene about inpatient rehab. Explained to them that it may just be for 7-10 days. Earlene states that would be great as he is trying to get him into a hotel that rents by the month in [place] that he will go to when discharged from rehab. If he is unable to get into rehab patient will stay with his brother until a bed is available. Spoke to Tameka with [facility] who will be coming by the hospital shortly to speak to patient & brother.

## 2023-04-04 NOTE — SUBJECTIVE & OBJECTIVE
Interval History: Good UOP w/ lasix. O2 requirement decreasing. Continuing IV abx while cultures pending. Podiatry to re-evaluate tomorrow for dressing change. PT/OT consulted. Recommending IPR. Hopeful for medical clearance by Thursday.    Review of Systems   All other systems reviewed and are negative.  Objective:     Vital Signs (Most Recent):  Temp: 99 °F (37.2 °C) (04/04/23 1145)  Pulse: 78 (04/04/23 1125)  Resp: 17 (04/04/23 1125)  BP: (!) 155/68 (04/04/23 1125)  SpO2: (!) 93 % (04/04/23 1500)   Vital Signs (24h Range):  Temp:  [94 °F (34.4 °C)-99 °F (37.2 °C)] 99 °F (37.2 °C)  Pulse:  [76-87] 78  Resp:  [16-21] 17  SpO2:  [89 %-94 %] 93 %  BP: (113-180)/(54-79) 155/68     Weight: 68.9 kg (152 lb)  Body mass index is 23.11 kg/m².    Intake/Output Summary (Last 24 hours) at 4/4/2023 1536  Last data filed at 4/4/2023 1528  Gross per 24 hour   Intake 1194 ml   Output 2550 ml   Net -1356 ml      Physical Exam  Vitals reviewed  General: NAD, Well developed, Well Nourished  Head: NC/AT  Eyes: EOMI, JAY  Cardiovascular: Pulses intact distally, Regular Rate and rhythm  Pulmonary: Normal Respiratory Rate, No respiratory distress  Gi: Soft, Non-tender  Extremities: Warm, No edema present, right foot wrapped with surgical dressing  Skin: Warm, dry  Neuro: Alert, No focal Deficit  Psych: Appropriate mood and affect    Significant Labs: All pertinent labs within the past 24 hours have been reviewed.    Significant Imaging: I have reviewed all pertinent imaging results/findings within the past 24 hours.

## 2023-04-04 NOTE — ASSESSMENT & PLAN NOTE
Acute hypoxic respiratory failure likely 2/2 volume overload  BNP elevated and patient w/ O2 requirement  TTE pending  Continue IV lasix for goal net negative 1.5-2L daily for today  K>4, Mg>2  Continuous tele w/ pulse ox  Wean O2 as tolerated to maintain O2>92

## 2023-04-04 NOTE — PT/OT/SLP EVAL
Occupational Therapy   Evaluation    Name: Juan Pablo Chang  MRN: [id number]  Admitting Diagnosis: Cellulitis and abscess of toe of right foot  Recent Surgery: Procedure(s) (LRB):  AMPUTATION, FOOT, TRANSMETATARSAL (Right) 2 Days Post-Op    Recommendations:     Discharge Recommendations:  Inpatient rehab  Discharge Equipment Recommendations:   Rolling walker  Barriers to discharge:   None    Assessment:   The patient is a 61 y/o male with PMH of T2DM, neuropathy, and HTN who presented with dizziness and falls. He states falling about 8 times today. He reports dizziness and balance issues over the past 4 weeks. On presentation to the ER he was found to be orthostatic. Patient also found to have 3rd metatarsal fracture and dislocation of the 2nd toe. The second toe also appears to be ecchymotic and dusky. He reports that the toe had been like this for several months. He also had a temp of 101 in the ER.     Rehab Prognosis: Good     Plan:     Patient to be seen   to address the above listed problems via    Plan of Care Expires:  When patient is discharged from hospital.  Patient will be treated 3-5x per week.  Plan of Care Reviewed with:  Patient     Subjective       Occupational Profile:  Living Environment: Patient lives in an RV with a friend.  Previous level of function: Modified independent   Equipment Used at Home:  None  Assistance upon Discharge: Friend    Pain/Comfort:   Patient did not report having pain during evaluation.      Objective:     Communicated with: OT spoke with patient's nurse prior to entering patient's room for evaluation.    General Precautions: Standard,    Orthopedic Precautions:  NWB on RLE  Braces:    Respiratory Status: Room air    Occupational Performance:    Bed Mobility:    Patient requires Owen with bed mobility.     Functional Mobility/Transfers:  Patient requires Owen with functional transfers.    Activities of Daily Living:  Patient requires modA with ADLs.    Cognitive/Visual  Perceptual:  Patient is alert and oriented x4.    Physical Exam:  Patient does not have any functional deficits with BUE.      Treatment & Education:  Patient tolerated evaluation well on this date with OT.  Patient requires Owen with bed mobility and functional transfers.  Patient does not have any functional deficits with BUE.      GOALS:   Patient will require wOen with LE dressing.  Patient will require CGA with UE dressing.  Patient will require SBA with grooming.      History:     Past Medical History:   Diagnosis Date    Diabetes mellitus     Hypertension     Neuropathy          Past Surgical History:   Procedure Laterality Date    FOOT AMPUTATION THROUGH METATARSAL Right 4/2/2023    Procedure: AMPUTATION, FOOT, TRANSMETATARSAL;  Surgeon: Orion Donaldson DPM;  Location: [facility];  Service: Podiatry;  Laterality: Right;       Time Tracking:     OT Date of Treatment:  4/4/2023  OT Start Time:  9:50  OT Stop Time:  10:13  OT Total Time (min):  23 minutes     Harshil Toro OTR/L    4/4/2023

## 2023-04-05 LAB
ANION GAP SERPL CALC-SCNC: 8 MMOL/L (ref 8–16)
BASOPHILS # BLD AUTO: 0.05 K/UL (ref 0–0.2)
BASOPHILS NFR BLD: 0.5 % (ref 0–1.9)
BUN SERPL-MCNC: 22 MG/DL (ref 8–23)
CALCIUM SERPL-MCNC: 8.3 MG/DL (ref 8.7–10.5)
CHLORIDE SERPL-SCNC: 103 MMOL/L (ref 95–110)
CO2 SERPL-SCNC: 25 MMOL/L (ref 23–29)
CREAT SERPL-MCNC: 1.7 MG/DL (ref 0.5–1.4)
DIFFERENTIAL METHOD: ABNORMAL
EOSINOPHIL # BLD AUTO: 0.2 K/UL (ref 0–0.5)
EOSINOPHIL NFR BLD: 1.8 % (ref 0–8)
ERYTHROCYTE [DISTWIDTH] IN BLOOD BY AUTOMATED COUNT: 13.2 % (ref 11.5–14.5)
EST. GFR  (NO RACE VARIABLE): 44.5 ML/MIN/1.73 M^2
GLUCOSE SERPL-MCNC: 237 MG/DL (ref 70–110)
HCT VFR BLD AUTO: 26.1 % (ref 40–54)
HGB BLD-MCNC: 8.7 G/DL (ref 14–18)
IMM GRANULOCYTES # BLD AUTO: 0.07 K/UL (ref 0–0.04)
IMM GRANULOCYTES NFR BLD AUTO: 0.7 % (ref 0–0.5)
LYMPHOCYTES # BLD AUTO: 0.9 K/UL (ref 1–4.8)
LYMPHOCYTES NFR BLD: 9 % (ref 18–48)
MAGNESIUM SERPL-MCNC: 2 MG/DL (ref 1.6–2.6)
MCH RBC QN AUTO: 28.6 PG (ref 27–31)
MCHC RBC AUTO-ENTMCNC: 33.3 G/DL (ref 32–36)
MCV RBC AUTO: 86 FL (ref 82–98)
MONOCYTES # BLD AUTO: 1 K/UL (ref 0.3–1)
MONOCYTES NFR BLD: 9.9 % (ref 4–15)
NEUTROPHILS # BLD AUTO: 8.2 K/UL (ref 1.8–7.7)
NEUTROPHILS NFR BLD: 78.1 % (ref 38–73)
NRBC BLD-RTO: 0 /100 WBC
PLATELET # BLD AUTO: 366 K/UL (ref 150–450)
PMV BLD AUTO: 10.8 FL (ref 9.2–12.9)
POCT GLUCOSE: 212 MG/DL (ref 70–110)
POCT GLUCOSE: 257 MG/DL (ref 70–110)
POCT GLUCOSE: 260 MG/DL (ref 70–110)
POCT GLUCOSE: 284 MG/DL (ref 70–110)
POTASSIUM SERPL-SCNC: 3.6 MMOL/L (ref 3.5–5.1)
RBC # BLD AUTO: 3.04 M/UL (ref 4.6–6.2)
SODIUM SERPL-SCNC: 136 MMOL/L (ref 136–145)
WBC # BLD AUTO: 10.43 K/UL (ref 3.9–12.7)

## 2023-04-05 PROCEDURE — 25000003 PHARM REV CODE 250: Performed by: PODIATRIST

## 2023-04-05 PROCEDURE — 20000000 HC ICU ROOM

## 2023-04-05 PROCEDURE — 63600175 PHARM REV CODE 636 W HCPCS: Performed by: STUDENT IN AN ORGANIZED HEALTH CARE EDUCATION/TRAINING PROGRAM

## 2023-04-05 PROCEDURE — 99024 POSTOP FOLLOW-UP VISIT: CPT | Mod: ,,, | Performed by: PODIATRIST

## 2023-04-05 PROCEDURE — 99233 PR SUBSEQUENT HOSPITAL CARE,LEVL III: ICD-10-PCS | Mod: ,,, | Performed by: STUDENT IN AN ORGANIZED HEALTH CARE EDUCATION/TRAINING PROGRAM

## 2023-04-05 PROCEDURE — 25000003 PHARM REV CODE 250: Performed by: STUDENT IN AN ORGANIZED HEALTH CARE EDUCATION/TRAINING PROGRAM

## 2023-04-05 PROCEDURE — 83735 ASSAY OF MAGNESIUM: CPT | Performed by: STUDENT IN AN ORGANIZED HEALTH CARE EDUCATION/TRAINING PROGRAM

## 2023-04-05 PROCEDURE — 85025 COMPLETE CBC W/AUTO DIFF WBC: CPT | Performed by: INTERNAL MEDICINE

## 2023-04-05 PROCEDURE — 99024 PR POST-OP FOLLOW-UP VISIT: ICD-10-PCS | Mod: ,,, | Performed by: PODIATRIST

## 2023-04-05 PROCEDURE — 25000003 PHARM REV CODE 250: Performed by: INTERNAL MEDICINE

## 2023-04-05 PROCEDURE — 80048 BASIC METABOLIC PNL TOTAL CA: CPT | Performed by: INTERNAL MEDICINE

## 2023-04-05 PROCEDURE — 97110 THERAPEUTIC EXERCISES: CPT

## 2023-04-05 PROCEDURE — 99233 SBSQ HOSP IP/OBS HIGH 50: CPT | Mod: ,,, | Performed by: STUDENT IN AN ORGANIZED HEALTH CARE EDUCATION/TRAINING PROGRAM

## 2023-04-05 PROCEDURE — 27000221 HC OXYGEN, UP TO 24 HOURS

## 2023-04-05 PROCEDURE — 63600175 PHARM REV CODE 636 W HCPCS: Performed by: INTERNAL MEDICINE

## 2023-04-05 PROCEDURE — 21400001 HC TELEMETRY ROOM

## 2023-04-05 PROCEDURE — 94761 N-INVAS EAR/PLS OXIMETRY MLT: CPT

## 2023-04-05 RX ORDER — POTASSIUM CHLORIDE 20 MEQ/1
20 TABLET, EXTENDED RELEASE ORAL ONCE
Status: COMPLETED | OUTPATIENT
Start: 2023-04-05 | End: 2023-04-05

## 2023-04-05 RX ORDER — HEPARIN SODIUM 5000 [USP'U]/ML
5000 INJECTION, SOLUTION INTRAVENOUS; SUBCUTANEOUS EVERY 12 HOURS
Status: DISCONTINUED | OUTPATIENT
Start: 2023-04-05 | End: 2023-04-07 | Stop reason: HOSPADM

## 2023-04-05 RX ORDER — CLINDAMYCIN HYDROCHLORIDE 150 MG/1
450 CAPSULE ORAL EVERY 6 HOURS
Status: DISCONTINUED | OUTPATIENT
Start: 2023-04-05 | End: 2023-04-07 | Stop reason: HOSPADM

## 2023-04-05 RX ADMIN — OXYCODONE 5 MG: 5 TABLET ORAL at 12:04

## 2023-04-05 RX ADMIN — OXYCODONE 5 MG: 5 TABLET ORAL at 07:04

## 2023-04-05 RX ADMIN — POTASSIUM CHLORIDE 20 MEQ: 1500 TABLET, EXTENDED RELEASE ORAL at 12:04

## 2023-04-05 RX ADMIN — METRONIDAZOLE 500 MG: 250 TABLET ORAL at 05:04

## 2023-04-05 RX ADMIN — HEPARIN SODIUM 5000 UNITS: 5000 INJECTION, SOLUTION INTRAVENOUS; SUBCUTANEOUS at 09:04

## 2023-04-05 RX ADMIN — MUPIROCIN: 20 OINTMENT TOPICAL at 09:04

## 2023-04-05 RX ADMIN — LISINOPRIL 5 MG: 2.5 TABLET ORAL at 08:04

## 2023-04-05 RX ADMIN — GABAPENTIN 400 MG: 100 CAPSULE ORAL at 08:04

## 2023-04-05 RX ADMIN — INSULIN ASPART 3 UNITS: 100 INJECTION, SOLUTION INTRAVENOUS; SUBCUTANEOUS at 04:04

## 2023-04-05 RX ADMIN — HEPARIN SODIUM 5000 UNITS: 5000 INJECTION, SOLUTION INTRAVENOUS; SUBCUTANEOUS at 08:04

## 2023-04-05 RX ADMIN — GABAPENTIN 400 MG: 100 CAPSULE ORAL at 09:04

## 2023-04-05 RX ADMIN — MUPIROCIN: 20 OINTMENT TOPICAL at 08:04

## 2023-04-05 RX ADMIN — INSULIN ASPART 3 UNITS: 100 INJECTION, SOLUTION INTRAVENOUS; SUBCUTANEOUS at 10:04

## 2023-04-05 RX ADMIN — FAMOTIDINE 20 MG: 20 TABLET ORAL at 08:04

## 2023-04-05 RX ADMIN — CLINDAMYCIN HYDROCHLORIDE 450 MG: 150 CAPSULE ORAL at 07:04

## 2023-04-05 RX ADMIN — INSULIN ASPART 2 UNITS: 100 INJECTION, SOLUTION INTRAVENOUS; SUBCUTANEOUS at 08:04

## 2023-04-05 RX ADMIN — OXYCODONE 5 MG: 5 TABLET ORAL at 04:04

## 2023-04-05 RX ADMIN — OXYCODONE 5 MG: 5 TABLET ORAL at 05:04

## 2023-04-05 NOTE — PROGRESS NOTES
"CARDIOLOGY PROGRESS NOTE    Patient Name:  Juan Pablo Chang    :  1960    Medical Record:  [id number]    DATE OF SERVICE  2023        SUBJECTIVE  The patient is being seen for follow-up status post increased troponin no chest pain.  Status post acute half path EF 55%.  Hypertension blood pressure improved 137/65 today.  Renal failure BUN 22 creatinine 1.7 mildly increased.  Hypokalemia K 3.6.      REVIEW OF SYSTEMS  General: No chills, No fever, No fatigue  Eyes: No vision changes, No drainage from eyes  ENT: No nasal congestion, no sore throat  Respiratory: No shortness of breath, No cough  Cardiac: No chest pain, palpitations, dyspnea, dizziness, claudication, or syncopal episodes  GI: No abdominal pain, no nausea, no vomiting  : No dysuria  Musculoskeletal: No joint pain  Neuro: No weakness, dizziness, vision changes       OBJECTIVE          PHYSICAL EXAM  Vital Signs:  /65 (BP Location: Left arm, Patient Position: Lying)   Pulse 70   Temp 98.3 °F (36.8 °C) (Oral)   Resp 18   Ht 5' 8" (1.727 m)   Wt 68.9 kg (152 lb)   SpO2 95%   BMI 23.11 kg/m²   Temp:  [94 °F (34.4 °C)-99.1 °F (37.3 °C)] 98.3 °F (36.8 °C)  Pulse:  [70-78] 70  Resp:  [17-20] 18  SpO2:  [90 %-97 %] 95 %  BP: (137-169)/(65-77) 137/65      General:   Eyes: Anicteric sclera. Moist conjunctiva. Vision grossly intact.  HENMT: Normocephalic.  Moist mucous membranes. No obvious ulcerations.   Neck: Trachea midline.   Cardiovascular: Heart regular rate and rhythm. S1, S2, S3 2/6 systolic ejection murmur left sternal border  Peripheral pulses palpable. No peripheral edema.   Respiratory: Lungs clear to auscultation bilaterally. No increased work of breathing.  Gastrointestinal: Bowel sounds active in all 4 quadrants. Soft, nontender, nondistended.   Genitourinary: Urine clear yellow  Musculoskeletal: Moves all extremities with equal strength.  Right foot bandaged  Skin: Color normal for ethnicity. Skin warm and dry with good turgor. " Capillary refill < 3 seconds.   Neurologic: Oriented x 3.  Speech fluent, follows commands.  Psychiatric:  Awake and alert, normal affect mood good.      LABS    CBC  Recent Labs   Lab 04/03/23  0453 04/04/23  0509 04/05/23  0415   WBC 14.51*  14.51* 12.01 10.43   RBC 2.83*  2.83* 3.01* 3.04*   HGB 8.1*  8.1* 8.7* 8.7*   HCT 24.9*  24.9* 26.0* 26.1*   MCV 88  88 86 86   MCH 28.6  28.6 28.9 28.6   MCHC 32.5  32.5 33.5 33.3   RDW 13.2  13.2 13.2 13.2   MPV 11.1  11.1 10.9 10.8     318 354 366       Chemistry  Recent Labs   Lab 03/31/23  1750 04/01/23  0435 04/02/23  0331 04/03/23  0453 04/03/23  1637 04/04/23  0509 04/05/23  0415   *   < > 140 138  138 136 137 136   K 4.4   < > 4.4 4.6  4.6 4.2 4.3 3.6      < > 110 109  109 104 105 103   CO2 24   < > 18* 21*  21* 21* 21* 25   BUN 32*   < > 21 22  22 24* 21 22   CREATININE 2.3*   < > 1.7* 1.6*  1.6* 1.8* 1.6* 1.7*   *   < > 128* 155*  155* 225* 154* 237*   PROT 7.3  --  6.4 6.0  6.0  --   --   --    CALCIUM 9.2   < > 8.5* 8.4*  8.4* 8.4* 8.4* 8.3*   BILITOT 0.8  --  0.7 0.5  0.5  --   --   --    AST 30  --  24 18  18  --   --   --    ALT 21  --  18 15  15  --   --   --     < > = values in this interval not displayed.       ABG  No results for input(s): PHART, UTF4SIM, PO2ART, OSD2UCM, S6CRRHET, BEART, Y7ANBXQL in the last 168 hours.      MICROBIOLOGY  Reviewed    IMAGING & OTHER STUDIES  Reviewed      Intake/Output last 3 shifts:  I/O last 3 completed shifts:  In: 2020 [P.O.:2020]  Out: 2150 [Urine:2150]    Scheduled meds:   ceFEPime (MAXIPIME) IVPB  2 g Intravenous Q24H    famotidine  20 mg Oral Daily    gabapentin  400 mg Oral BID    heparin (porcine)  5,000 Units Subcutaneous Daily    lisinopriL  5 mg Oral Daily    metroNIDAZOLE  500 mg Oral Q8H    mupirocin   Nasal BID    vancomycin (VANCOCIN) IVPB  1,000 mg Intravenous Q24H       PRN Meds:  acetaminophen, dextrose 10%, dextrose 10%, dextrose, dextrose, guaiFENesin  100 mg/5 ml, insulin aspart U-100, naloxone, oxyCODONE, sodium chloride 0.9%    Continuous Infusions:      Dietary Orders:  [unfilled]     Admit weight: Weight: 65.8 kg (145 lb)   Today weight: Weight: 68.9 kg (152 lb)      Length of stay in days: 3      ASSESSMENT    Active Hospital Problems    Diagnosis  POA    *Cellulitis and abscess of toe of right foot [L03.031, L02.611]  Yes    Acute respiratory failure with hypoxia [J96.01]  No    Abscess of bursa of right foot [M71.071]  Yes    Chronic multifocal osteomyelitis of right foot [M86.371]  Yes    Acute bronchitis [J20.9]  Yes    Cellulitis of right lower extremity [L03.115]  Yes    Elevated troponin [R77.8]  Yes    Fever [R50.9]  Yes    STERLING (acute kidney injury) [N17.9]  Yes    Orthostatic hypotension [I95.1]  Yes    Dislocated toe [S93.106A]  Yes    Type 2 diabetes mellitus with hypoglycemia without coma, without long-term current use of insulin [E11.649]  Yes    Multiple falls [R29.6]  Not Applicable      Resolved Hospital Problems   No resolved problems to display.          PLAN    Elevated troponin  Increased troponin 0.58  No chest pain  Sinus right bundle-branch block     Decreased saturation room air  Chest x-ray interstitial disease        Partial amputation of foot right  Dr. Donaldson  04/02/2023     Type 2 diabetes     Renal failure  BUN 21 creatinine 1.6 4/4     Elevated blood pressure  Improved     Anemia  Hemoglobin 8.7 4/5  Pepcid 20 mg p.o. daily     Plan  Monitor blood pressure  Increased creatinine status post lisinopril 1 day ago   Increased heparin 5000 subQ b.i.d.  K 3.6 20 mg p.o. x1 today  CBC BMP in a.m.       Electronically signed by: Rohit Marsh, 4/5/2023 10:44 AM

## 2023-04-05 NOTE — SUBJECTIVE & OBJECTIVE
Interval History: NAEON. Patient doing well this morning. On RA. Podiatry coming to change dressing today. Cultures w/ staph. Stopping cefepime/flagyl today. Awaiting sensitivities to determine final abx regimen. Will discuss treatment length with podiatry.    Review of Systems   All other systems reviewed and are negative.  Objective:     Vital Signs (Most Recent):  Temp: 98.3 °F (36.8 °C) (04/05/23 0402)  Pulse: 70 (04/05/23 0900)  Resp: 18 (04/05/23 0900)  BP: 137/65 (04/05/23 0402)  SpO2: 95 % (04/05/23 0900) Vital Signs (24h Range):  Temp:  [94 °F (34.4 °C)-99.1 °F (37.3 °C)] 98.3 °F (36.8 °C)  Pulse:  [70-78] 70  Resp:  [17-20] 18  SpO2:  [90 %-97 %] 95 %  BP: (137-169)/(65-77) 137/65     Weight: 68.9 kg (152 lb)  Body mass index is 23.11 kg/m².    Intake/Output Summary (Last 24 hours) at 4/5/2023 1103  Last data filed at 4/4/2023 1855  Gross per 24 hour   Intake 1244 ml   Output 900 ml   Net 344 ml      Physical Exam  Vitals reviewed  General: NAD, Well developed, Well Nourished  Head: NC/AT  Eyes: EOMI, JAY  Cardiovascular: Regular Rate  Pulmonary: Normal Respiratory Rate, No respiratory distress  Gi: Soft, Non-tender  Extremities: Warm, No edema present, right foot wrapped with surgical dressing  Skin: Warm, dry  Neuro: Alert, No focal Deficit  Psych: Appropriate mood and affect    Significant Labs: All pertinent labs within the past 24 hours have been reviewed.    Significant Imaging: I have reviewed all pertinent imaging results/findings within the past 24 hours.

## 2023-04-05 NOTE — PT/OT/SLP PROGRESS
Occupational Therapy   Treatment    Name: Juan Pablo Chang  MRN: [id number]  Admitting Diagnosis:  Cellulitis and abscess of toe of right foot  3 Days Post-Op    Recommendations:     Discharge Recommendations:  Inpatient rehab  Discharge Equipment Recommendations:   Rolling walker  Barriers to discharge:   None    Assessment:   The patient is a 61 y/o male with PMH of T2DM, neuropathy, and HTN who presented with dizziness and falls. He states falling about 8 times today. He reports dizziness and balance issues over the past 4 weeks. On presentation to the ER he was found to be orthostatic. Patient also found to have 3rd metatarsal fracture and dislocation of the 2nd toe. The second toe also appears to be ecchymotic and dusky. He reports that the toe had been like this for several months. He also had a temp of 101 in the ER.        Rehab Prognosis:  Good       Plan:     Patient to be seen   to address the above listed problems via    Plan of Care Expires:  When patient is discharged from hospital.  Patient will be treated 3-5x per week.  Plan of Care Reviewed with:  Patient     Subjective       Pain/Comfort:   No pain    Objective:     Communicated with: OT spoke with patient's nurse prior to entering patient's room for evaluation.    General Precautions: Standard,      Orthopedic Precautions:   Braces:    Respiratory Status: Room air     Occupational Performance:     Bed Mobility:    Patient requires CGA with bed mobility.     Functional Mobility/Transfers:  Patient requires Owen with functional transfer with RW.    Activities of Daily Living:  Patient requires Owen with ADLs.      Treatment & Education:  Patient tolerated skilled OT session well on this date with OT.  Patient requires CGA with bed mobility.  Patient requires Oewn with functional transfers and ADL performance.  Patient will be a good candidate for inpatient rehab.    GOALS:   See initial evaluation for goals.    Time Tracking:     OT Date of Treatment:   4/5/2023  OT Start Time:  9:58  OT Stop Time:  10:15  OT Total Time (min):  17 minutes     Harshil Toro OTR/L               4/5/2023

## 2023-04-05 NOTE — ASSESSMENT & PLAN NOTE
Acute hypoxic respiratory failure likely 2/2 volume overload w/ HFpEF exacerbation  BNP elevated and patient w/ O2 requirement  TTE completed  Appears to have resolved

## 2023-04-05 NOTE — PLAN OF CARE
[facility] - Intensive Care  Discharge Reassessment    Primary Care Provider: EDNA Butler    Expected Discharge Date:   Patient resting in bed with brother at bedside. Updated them both to the status of his rehab referral. Auth with Medicaid was started today with [facility]. They expect to have a decision tomorrow or Friday at the latest. Patient can go once approval is received from Medicaid. Patient's brother Earlene will be able to bring to [facility] when he is discharged. Will continue to follow for needs.   Reassessment (most recent)       Discharge Reassessment - 04/05/23 1215          Discharge Reassessment    Assessment Type Discharge Planning Reassessment     Did the patient's condition or plan change since previous assessment? No     Discharge Plan discussed with: Patient;Sibling     Name(s) and Number(s) Earlene Chang brother [phone]     Communicated MARION with patient/caregiver Yes     Discharge Plan A Rehab     DME Needed Upon Discharge  none     Discharge Barriers Identified None     Why the patient remains in the hospital Requires continued medical care        Post-Acute Status    Post-Acute Authorization Placement     Post-Acute Placement Status Pending payor review/awaiting authorization (if required)     Discharge Delays None known at this time

## 2023-04-05 NOTE — ASSESSMENT & PLAN NOTE
Patient's FSGs are controlled on current medication regimen.  Last A1c reviewed-   Lab Results   Component Value Date    HGBA1C 6.5 (H) 03/31/2023     Most recent fingerstick glucose reviewed-   Recent Labs   Lab 04/04/23  1647 04/04/23  2118 04/05/23  0753 04/05/23  1032   POCTGLUCOSE 260* 363* 212* 284*     Current correctional scale  Low  Maintain anti-hyperglycemic dose as follows-   Antihyperglycemics (From admission, onward)    Start     Stop Route Frequency Ordered    04/01/23 1214  insulin aspart U-100 pen 0-5 Units         -- SubQ Before meals & nightly PRN 04/01/23 1115        Hold Oral hypoglycemics while patient is in the hospital.    Not requiring any insulin

## 2023-04-05 NOTE — ASSESSMENT & PLAN NOTE
Orthostasis improved.   Suspect mechanical component due to neuropathy and RGT amputation   NWB RLE  PT/OT consulted- recommend IPR

## 2023-04-05 NOTE — PROGRESS NOTES
CARDIOLOGY CONSULTATION    Patient Name:  Juan Pablo Chang    :  1960    Medical Record:  [id number]    DATE OF SERVICE: 2023    ATTENDING PHYSICIAN: Devin Baker MD    REASON FOR CONSULT:     HISTORY OF PRESENT ILLNESS  The patient is a 62 y.o. y/o male who is hospitalized for Cellulitis and abscess of toe of right foot.  Status post increased troponin no chest pain.  Status post acute HFpEF EF 55% no shortness of breath.  Hypertension increased blood pressure 156/70.  Renal failure BUN 21 creatinine 1.6      PAST MEDICAL HISTORY  Past Medical History:   Diagnosis Date    Diabetes mellitus     Hypertension     Neuropathy        PAST SURGICAL HISTORY  Past Surgical History:   Procedure Laterality Date    FOOT AMPUTATION THROUGH METATARSAL Right 2023    Procedure: AMPUTATION, FOOT, TRANSMETATARSAL;  Surgeon: Orion Donaldson DPM;  Location: [facility];  Service: Podiatry;  Laterality: Right;       SOCIAL HISTORY   reports that he has quit smoking. He uses smokeless tobacco. He reports that he does not currently use alcohol. He reports that he does not use drugs.      FAMILY HISTORY  History reviewed. No pertinent family history.      ALLERGIES   Review of patient's allergies indicates:  No Known Allergies    HOME  MEDICATIONS  Prior to Admission medications    Medication Sig Start Date End Date Taking? Authorizing Provider   gabapentin (NEURONTIN) 800 MG tablet Take 800 mg by mouth 3 (three) times daily.   Yes Historical Provider   glipiZIDE (GLUCOTROL) 10 MG tablet Take 10 mg by mouth 2 (two) times daily before meals.   Yes Historical Provider   INV LISINOPRIL-HCTZ 20-25 Take 1 tablet by mouth once daily. FOR INVESTIGATIONAL USE ONLY   Yes Historical Provider   metFORMIN (GLUCOPHAGE) 1000 MG tablet Take 1,000 mg by mouth 2 (two) times daily with meals.   Yes Historical Provider   promethazine (PHENERGAN) 25 MG tablet Take 2 tablets (50 mg total) by mouth every 6 (six) hours as needed for Nausea.  9/2/20   Khanh Jorge MD     CURRENT MEDICATIONS   ceFEPime (MAXIPIME) IVPB  2 g Intravenous Q24H    famotidine  20 mg Oral Daily    gabapentin  400 mg Oral BID    metroNIDAZOLE  500 mg Oral Q8H    mupirocin   Nasal BID    vancomycin (VANCOCIN) IVPB  1,000 mg Intravenous Q24H     acetaminophen, dextrose 10%, dextrose 10%, dextrose, dextrose, guaiFENesin 100 mg/5 ml, insulin aspart U-100, naloxone, oxyCODONE, sodium chloride 0.9%      REVIEW OF SYSTEMS  General: no weight loss, no fever, no chills, no anorexia  Skin: no rash  Eyes: no blurry vision  Ears/Nose/Throat: no nasal congestion, no sore throat  Respiratory: no cough, no dyspnea, no wheezing  Cardiovascular: no chest pain, no ankle swelling  Gastrointestinal: no nausea, no vomiting, no diarrhea, no constipation, no abdominal pain. No melena, no bright red blood per rectum  Genitourinary: no dysuria, no hematuria  Musculoskeletal: no joint pain, no myalgia, no muscle weakness+ right foot pain  Neurologic: no seizures, no tremors, no fainting  Hematologic/Lymphatic: no abnormal bleeding, no abnormal bruising  Endocrine: no heat or cold intolerance, no polydipsia, no polyuria  Psychiatric: no anxiety, no depression  Allergy/Immunology: no seasonal allergies, no joint stiffness in morning      PHYSICAL EXAM  Vital Signs:  Temp:  [94 °F (34.4 °C)-99.1 °F (37.3 °C)] 99.1 °F (37.3 °C)  Pulse:  [76-87] 78  Resp:  [16-21] 20  SpO2:  [89 %-94 %] 90 %  BP: (126-180)/(61-79) 156/70  Constitutional:  Healthy, no distress  HENT:  Normocephalic, Atraumatic, Bilateral external ears normal, Oropharynx moist, No oral exudates, No tenderness, neck supple.  Eyes:  PERRL, EOMI, Conjunctiva normal, No discharge.   Lymphatic:  No cervical or supraclavicular lymphadenopathy noted.   Cardiovascular:  Normal heart rate, Normal rhythm, S3 2/6 systolic murmur left sternal border, No rubs, No gallops.   Respiratory:  Normal breath sounds, No respiratory distress, No wheezing, No  rhonchi, No rales, No chest tenderness.   GI:  Bowel sounds normal, Soft, No tenderness, No rebound or guarding, No masses.   Integument:  Warm, Dry, No erythema, No rash.   Musculoskeletal/Extremities:  Intact distal pulses, No edema, No tenderness, No cyanosis, No clubbing.  Right foot bandage.  Good range of motion in all major joints.   Neurologic:  Alert & oriented x 3, cranial nerves grossly intact, No focal deficits.    LABS    CBC  Recent Labs   Lab 04/02/23  0331 04/03/23  0453 04/04/23  0509   WBC 15.64* 14.51*  14.51* 12.01   RBC 3.28* 2.83*  2.83* 3.01*   HGB 9.6* 8.1*  8.1* 8.7*   HCT 28.8* 24.9*  24.9* 26.0*   MCV 88 88  88 86   MCH 29.3 28.6  28.6 28.9   MCHC 33.3 32.5  32.5 33.5   RDW 13.1 13.2  13.2 13.2   MPV 11.0 11.1  11.1 10.9    318  318 354       Chemistry  Recent Labs   Lab 03/31/23  1750 04/01/23  0435 04/02/23  0331 04/03/23  0453 04/03/23  1637 04/04/23  0509   *   < > 140 138  138 136 137   K 4.4   < > 4.4 4.6  4.6 4.2 4.3      < > 110 109  109 104 105   CO2 24   < > 18* 21*  21* 21* 21*   BUN 32*   < > 21 22  22 24* 21   CREATININE 2.3*   < > 1.7* 1.6*  1.6* 1.8* 1.6*   *   < > 128* 155*  155* 225* 154*   PROT 7.3  --  6.4 6.0  6.0  --   --    CALCIUM 9.2   < > 8.5* 8.4*  8.4* 8.4* 8.4*   BILITOT 0.8  --  0.7 0.5  0.5  --   --    AST 30  --  24 18  18  --   --    ALT 21  --  18 15  15  --   --     < > = values in this interval not displayed.       ABG  No results for input(s): PHART, KDD9KKM, PO2ART, POE1CBS, H8HDJESU, BEART, C5TGFNJN in the last 168 hours.    IMAGING STUDIES & OTHER STUDIES  Reviewed          ASSESSMENT  Active Hospital Problems    Diagnosis  POA    *Cellulitis and abscess of toe of right foot [L03.031, L02.611]  Yes    Acute respiratory failure with hypoxia [J96.01]  No    Abscess of bursa of right foot [M71.071]  Yes    Chronic multifocal osteomyelitis of right foot [M86.371]  Yes    Acute bronchitis [J20.9]  Yes     Cellulitis of right lower extremity [L03.115]  Yes    Elevated troponin [R77.8]  Yes    Fever [R50.9]  Yes    STERLING (acute kidney injury) [N17.9]  Yes    Orthostatic hypotension [I95.1]  Yes    Dislocated toe [S93.106A]  Yes    Type 2 diabetes mellitus with hypoglycemia without coma, without long-term current use of insulin [E11.649]  Yes    Multiple falls [R29.6]  Not Applicable      Resolved Hospital Problems   No resolved problems to display.       PLAN    Elevated troponin  Increased troponin 0.58  No chest pain  Sinus right bundle-branch block     Decreased saturation room air  Chest x-ray interstitial disease        Partial amputation of foot right  Dr. Donaldson  04/02/2023     Type 2 diabetes     Renal failure  BUN 21 creatinine 1.6 4/4    Elevated blood pressure  156/70     Anemia  Hemoglobin 8.7 4/4  Pepcid 20 mg p.o. daily    Plan  Lisinopril 5 mg x 1  BMP in a.m.  Heparin 5000 subQ daily     The plan was discussed with the patient and/or family.    Thank you for the Consult.    Electronically signed by: Rohit Marsh, 4/4/2023 7:26 PM     Time spent on counseling/coordination of care:   Total time spent with patient:

## 2023-04-05 NOTE — ASSESSMENT & PLAN NOTE
Lab Results   Component Value Date    CREATININE 1.7 (H) 04/05/2023     Sr Cr elevated, unknown baseline (suspect some CKD)  Daily CMP  Avoid nephrotoxins.   Renally dose all medications   Monitor events that may lead to decreased renal perfusion (hypovolemia, hypotension, sepsis).   Monitor urine output (goal 0.5 mL/kg/hr) to assure that no obstruction precipitates worsening in GFR.

## 2023-04-05 NOTE — PROGRESS NOTES
[facility] Intensive Care  Podiatry  Progress Note    Patient Name: Juan Pablo Chang  MRN: [id number]  Admission Date: 3/31/2023  Hospital Length of Stay: 3 days  Attending Physician: Devin Baker MD  Primary Care Provider: EDNA Butler     Subjective:     Interval History:  Patient presents status post transmetatarsal amputation right foot he is currently postop day 3.  Patient is resting comfortably not having any or discomfort at this time patient is currently afebrile.  Patient continues to elevate his foot.      Follow-up For: Procedure(s) (LRB):  AMPUTATION, FOOT, TRANSMETATARSAL (Right)    Post-Operative Day: 3 Days Post-Op    Scheduled Meds:   famotidine  20 mg Oral Daily    gabapentin  400 mg Oral BID    heparin (porcine)  5,000 Units Subcutaneous Q12H    lisinopriL  5 mg Oral Daily    mupirocin   Nasal BID    vancomycin (VANCOCIN) IVPB  1,000 mg Intravenous Q24H     Continuous Infusions:  PRN Meds:acetaminophen, dextrose 10%, dextrose 10%, dextrose, dextrose, guaiFENesin 100 mg/5 ml, insulin aspart U-100, naloxone, oxyCODONE, sodium chloride 0.9%    Review of Systems  Objective:     Vital Signs (Most Recent):  Temp: 99.7 °F (37.6 °C) (04/05/23 1611)  Pulse: 76 (04/05/23 1611)  Resp: 18 (04/05/23 1619)  BP: (!) 140/64 (04/05/23 1611)  SpO2: (!) 93 % (04/05/23 1611)   Vital Signs (24h Range):  Temp:  [96.8 °F (36 °C)-99.7 °F (37.6 °C)] 99.7 °F (37.6 °C)  Pulse:  [70-76] 76  Resp:  [16-20] 18  SpO2:  [93 %-97 %] 93 %  BP: (137-169)/(64-77) 140/64     Weight: 68.9 kg (152 lb)  Body mass index is 23.11 kg/m².    Foot Exam    Laboratory:  All pertinent labs reviewed within the last 24 hours.    Diagnostic Results:  None    Clinical Findings:                    Assessment/Plan:     Active Diagnoses:    Diagnosis Date Noted POA    PRINCIPAL PROBLEM:  Cellulitis and abscess of toe of right foot [L03.031, L02.611] 04/01/2023 Yes    Acute respiratory failure with hypoxia [J96.01] 04/03/2023 No    Abscess of  bursa of right foot [M71.071] 04/02/2023 Yes    Chronic multifocal osteomyelitis of right foot [M86.371] 04/02/2023 Yes    Acute bronchitis [J20.9] 04/02/2023 Yes    Cellulitis of right lower extremity [L03.115] 04/02/2023 Yes    Elevated troponin [R77.8] 04/01/2023 Yes    Fever [R50.9] 04/01/2023 Yes    STERLING (acute kidney injury) [N17.9] 04/01/2023 Yes    Orthostatic hypotension [I95.1] 04/01/2023 Yes    Dislocated toe [S93.106A] 04/01/2023 Yes    Type 2 diabetes mellitus with hypoglycemia without coma, without long-term current use of insulin [E11.649] 04/01/2023 Yes    Multiple falls [R29.6] 04/01/2023 Not Applicable      Problems Resolved During this Admission:     Patient presents status post transmetatarsal amputation right foot he is currently postop day # 3.  Patient is resting comfortably not having any or discomfort at this time patient is currently afebrile.  Patient continues to elevate his foot.  Patient states he is doing very well he is not having any significant pain or discomfort at the time patient's dressing is dry and intact upon removal of the dressing the incision site looks very good patient has a little bit more inflammation than previously noted however there is no drainage no active bleeding noted both the dorsal and plantar skin flaps are warm to the touch with capillary fill time immediate dorsal and plantar.  Patient's WBCs have continue to decreased to 10.43 this morning.  At this point I am going to discontinue the elevation increasing blood flow to the surgical site to facilitate further healing as the patient's postoperative inflammation is noted to be well controlled.  Patient was made aware while he does not have to keep the foot elevated I do not want hanging over the bed or at bedside for more than 20 minutes at a time.  Aerobic culture and sensitivity has finalized his positive for Staph aureus I have requested my staff have the culture and sensitivity un suppressed but at this  point I feel the patient is okay to be on oral clindamycin and pending any changes to the sensitivity following being un suppressed patient is likely going to be well covered with oral clindamycin at discharge.  Patient is okay for discharge from my standpoint I understand he is likely going to rehab I will follow up with the patient in clinic at this point the dressing does not need to be changed for 7-10 days since there is no active bleeding no drainage noted patient was advised he must maintain complete nonweightbearing status of the right lower extremity for the area to heal properly any weight-bearing could jeopardize the healing and cause the incision to split open.  Patient stated that he was in understanding of this a new well-padded thick protective dressing was applied to the right lower extremity with a lightly applied Ace bandage with minimal compression.  Patient's dressing does not need to be changed until follow up with me 7-10 days.  Plan follow-up in clinic I will be leaving to go out of town April 7th and will follow up with him when I return early next week pending his discharge.  Will follow.This note was created using [employer] voice recognition software that occasionally misinterpreted phrases or words.       Orion Donaldson DPM  Podiatry  [place] - Intensive Care

## 2023-04-05 NOTE — PROGRESS NOTES
[facility] Medicine  Progress Note    Patient Name: Juan Pablo Chang  MRN: [id number]  Patient Class: IP- Inpatient   Admission Date: 3/31/2023  Length of Stay: 3 days  Attending Physician: Devin Baker MD  Primary Care Provider: EDNA Butler        Subjective:     Principal Problem:Cellulitis and abscess of toe of right foot        HPI:  The patient is a 61 y/o male with PMH of T2DM, neuropathy, and HTN who presented with dizziness and falls. He states falling about 8 times today. He reports dizziness and balance issues over the past 4 weeks. On presentation to the ER he was found to be orthostatic. Patient also found to have 3rd metatarsal fracture and dislocation of the 2nd toe. The second toe also appears to be ecchymotic and dusky. He reports that the toe had been like this for several months. He also had a temp of 101 in the ER.       Overview/Hospital Course:      Interval History: NAEON. Patient doing well this morning. On RA. Podiatry coming to change dressing today. Cultures w/ staph. Stopping cefepime/flagyl today. Awaiting sensitivities to determine final abx regimen. Will discuss treatment length with podiatry.    Review of Systems   All other systems reviewed and are negative.  Objective:     Vital Signs (Most Recent):  Temp: 98.3 °F (36.8 °C) (04/05/23 0402)  Pulse: 70 (04/05/23 0900)  Resp: 18 (04/05/23 0900)  BP: 137/65 (04/05/23 0402)  SpO2: 95 % (04/05/23 0900) Vital Signs (24h Range):  Temp:  [94 °F (34.4 °C)-99.1 °F (37.3 °C)] 98.3 °F (36.8 °C)  Pulse:  [70-78] 70  Resp:  [17-20] 18  SpO2:  [90 %-97 %] 95 %  BP: (137-169)/(65-77) 137/65     Weight: 68.9 kg (152 lb)  Body mass index is 23.11 kg/m².    Intake/Output Summary (Last 24 hours) at 4/5/2023 1103  Last data filed at 4/4/2023 1855  Gross per 24 hour   Intake 1244 ml   Output 900 ml   Net 344 ml      Physical Exam  Vitals reviewed  General: NAD, Well developed, Well Nourished  Head: NC/AT  Eyes: EOMI,  JAY  Cardiovascular: Regular Rate  Pulmonary: Normal Respiratory Rate, No respiratory distress  Gi: Soft, Non-tender  Extremities: Warm, No edema present, right foot wrapped with surgical dressing  Skin: Warm, dry  Neuro: Alert, No focal Deficit  Psych: Appropriate mood and affect    Significant Labs: All pertinent labs within the past 24 hours have been reviewed.    Significant Imaging: I have reviewed all pertinent imaging results/findings within the past 24 hours.      Assessment/Plan:      * Cellulitis and abscess of toe of right foot  S/P AMPUTATION, FOOT, TRANSMETATARSAL (Right) for acute on chronic osteomelitis  BCx NGTD  Stopped cefepime and flagyl  Continue vanc while wound culture pending  CT foot with abscess, cellulitis, likely early osteo    Microbiology Results (last 7 days)     Procedure Component Value Units Date/Time    Culture, Anaerobe [[id number]] Collected: 04/02/23 1107    Order Status: Completed Specimen: Incision site from Foot, Right Updated: 04/05/23 1043     Anaerobic Culture Culture in progress    Blood Culture #1 **CANNOT BE ORDERED STAT** [[id number]] Collected: 03/31/23 1925    Order Status: Completed Specimen: Blood from Peripheral, Antecubital, Right Updated: 04/04/23 1612     Blood Culture, Routine No Growth to date      No Growth to date      No Growth to date      No Growth to date    Blood Culture #2 **CANNOT BE ORDERED STAT** [[id number]] Collected: 03/31/23 1924    Order Status: Completed Specimen: Blood from Peripheral, Antecubital, Left Updated: 04/04/23 1612     Blood Culture, Routine No Growth to date      No Growth to date      No Growth to date      No Growth to date    Aerobic culture [[id number]]  (Abnormal) Collected: 04/02/23 1107    Order Status: Completed Specimen: Incision site from Foot, Right Updated: 04/04/23 0747     Aerobic Bacterial Culture STAPHYLOCOCCUS AUREUS  Many  Susceptibility pending      Influenza A & B by Molecular [[id number]]     Order Status:  Canceled Specimen: Nasopharyngeal Swab     Influenza A & B by Molecular [[id number]] Collected: 03/31/23 1921    Order Status: Completed Specimen: Nasopharyngeal Swab Updated: 03/31/23 2015     Influenza A, Molecular Negative     Influenza B, Molecular Negative     Flu A & B Source Nasal Swab            Acute respiratory failure with hypoxia  Acute hypoxic respiratory failure likely 2/2 volume overload w/ HFpEF exacerbation  BNP elevated and patient w/ O2 requirement  TTE completed  Appears to have resolved    Acute bronchitis  Flu negative in ED  Worsening cough, did desat but not requiring O2   Repeat CXR concerning for interstitial dx  Symptomatic treatment with duonebs and mucinex  On broad spectrum abx for foot infection, consider adding atypical coverage if respiratory sx worsen      Multiple falls  Orthostasis improved.   Suspect mechanical component due to neuropathy and RGT amputation   NWB RLE  PT/OT consulted- recommend IPR    Type 2 diabetes mellitus with hypoglycemia without coma, without long-term current use of insulin  Patient's FSGs are controlled on current medication regimen.  Last A1c reviewed-   Lab Results   Component Value Date    HGBA1C 6.5 (H) 03/31/2023     Most recent fingerstick glucose reviewed-   Recent Labs   Lab 04/04/23  1647 04/04/23  2118 04/05/23  0753 04/05/23  1032   POCTGLUCOSE 260* 363* 212* 284*     Current correctional scale  Low  Maintain anti-hyperglycemic dose as follows-   Antihyperglycemics (From admission, onward)    Start     Stop Route Frequency Ordered    04/01/23 1214  insulin aspart U-100 pen 0-5 Units         -- SubQ Before meals & nightly PRN 04/01/23 1115        Hold Oral hypoglycemics while patient is in the hospital.    Not requiring any insulin    Dislocated toe  See cellulitis/abscess      Orthostatic hypotension  Possibly in setting of sepsis/infection   Now improved after IVF  Hold lisinopril-HCTZ, restart as able with STERLING  Telemetry     STERLING (acute kidney  injury)  Lab Results   Component Value Date    CREATININE 1.7 (H) 04/05/2023     Sr Cr elevated, unknown baseline (suspect some CKD)  Daily CMP  Avoid nephrotoxins.   Renally dose all medications   Monitor events that may lead to decreased renal perfusion (hypovolemia, hypotension, sepsis).   Monitor urine output (goal 0.5 mL/kg/hr) to assure that no obstruction precipitates worsening in GFR.          Fever  See cellulitis management      Elevated troponin  Repeat trop did increase overnight, patient remains asymptomatic   Repeat EKG with stable TWI and non pathologic q waves per my read, no STEMI criteria  TTE completed  Cards consulted, appreciate recs      NSTEMI (non-ST elevated myocardial infarction)          VTE Risk Mitigation (From admission, onward)         Ordered     heparin (porcine) injection 5,000 Units  Every 12 hours         04/05/23 1047                Discharge Planning   MARION:      Code Status: Full Code   Is the patient medically ready for discharge?:     Reason for patient still in hospital (select all that apply): Treatment  Discharge Plan A: Rehab   Discharge Delays: None known at this time              Devin Baker MD  Department of Hospital Medicine   [facility] Intensive [facility]

## 2023-04-05 NOTE — PLAN OF CARE
Problem: Adult Inpatient Plan of Care  Goal: Plan of Care Review  Outcome: Ongoing, Progressing  Goal: Patient-Specific Goal (Individualized)  Outcome: Ongoing, Progressing  Goal: Absence of Hospital-Acquired Illness or Injury  Outcome: Ongoing, Progressing  Goal: Optimal Comfort and Wellbeing  Outcome: Ongoing, Progressing  Goal: Readiness for Transition of Care  Outcome: Ongoing, Progressing     Problem: Impaired Wound Healing  Goal: Optimal Wound Healing  Outcome: Ongoing, Progressing     Problem: Infection  Goal: Absence of Infection Signs and Symptoms  Outcome: Ongoing, Progressing     Problem: Fluid and Electrolyte Imbalance (Acute Kidney Injury/Impairment)  Goal: Fluid and Electrolyte Balance  Outcome: Ongoing, Progressing     Problem: Oral Intake Inadequate (Acute Kidney Injury/Impairment)  Goal: Optimal Nutrition Intake  Outcome: Ongoing, Progressing     Problem: Renal Function Impairment (Acute Kidney Injury/Impairment)  Goal: Effective Renal Function  Outcome: Ongoing, Progressing     Problem: Diabetes Comorbidity  Goal: Blood Glucose Level Within Targeted Range  Outcome: Ongoing, Progressing     Problem: Fall Injury Risk  Goal: Absence of Fall and Fall-Related Injury  Outcome: Ongoing, Progressing     Problem: Skin Injury Risk Increased  Goal: Skin Health and Integrity  Outcome: Ongoing, Progressing     Problem: Skin or Soft Tissue Infection  Goal: Absence of Infection Signs and Symptoms  Outcome: Ongoing, Progressing   POC reviewed at bedside. Questions and concerns addressed. VSS. Placed bed in low and locked position. Call light within reach. Side rails up x2. Instructed to call for any needs. Verbalized understanding of all instructions. Frequent rounds.

## 2023-04-05 NOTE — ASSESSMENT & PLAN NOTE
Repeat trop did increase overnight, patient remains asymptomatic   Repeat EKG with stable TWI and non pathologic q waves per my read, no STEMI criteria  TTE completed  Cards consulted, appreciate recs

## 2023-04-05 NOTE — PT/OT/SLP PROGRESS
Physical Therapy Treatment    Patient Name:  Juan Pablo Chang   MRN:  [id number]    Recommendations:     Discharge Recommendations: rehabilitation facility  Discharge Equipment Recommendations:  (to be determined at next level of care)  Barriers to discharge: None    Assessment:   HPI:  The patient is a 63 y/o male with PMH of T2DM, neuropathy, and HTN who presented with dizziness and falls. He states falling about 8 times today. He reports dizziness and balance issues over the past 4 weeks. On presentation to the ER he was found to be orthostatic. Patient also found to have 3rd metatarsal fracture and dislocation of the 2nd toe. The second toe also appears to be ecchymotic and dusky. He reports that the toe had been like this for several months. He also had a temp of 101 in the ER.     Juan Pablo Chang is a 62 y.o. male admitted with a medical diagnosis of Cellulitis and abscess of toe of right foot.  He presents with the following impairments/functional limitations: weakness, impaired endurance, impaired sensation, impaired self care skills, impaired functional mobility, gait instability, impaired balance, decreased lower extremity function, impaired coordination, orthopedic precautions.    Rehab Prognosis: Good; patient would benefit from acute skilled PT services to address these deficits and reach maximum level of function.    Recent Surgery: Procedure(s) (LRB):  AMPUTATION, FOOT, TRANSMETATARSAL (Right) 3 Days Post-Op    Plan:     During this hospitalization, patient to be seen  (3-5 x/wk) to address the identified rehab impairments via gait training, therapeutic activities, therapeutic exercises and progress toward the following goals:    Plan of Care Expires:   (upon discharge from facility)    Subjective     Chief Complaint: s/p transmetatarsal amputation right foot  Patient/Family Comments/goals: increase strength and mobility  Pain/Comfort:  Pain Rating 1: 0/10      Objective:     Communicated with RN prior to  session.  Patient found HOB elevated with peripheral IV, SCD, telemetry upon PT entry to room.     General Precautions: Standard, fall  Orthopedic Precautions: RLE non weight bearing  Braces: N/A  Respiratory Status: Room air     Functional Mobility:  Bed Mobility:  Supine to Sit: contact guard assistance  Sit to Supine: contact guard assistance  Transfers:  Sit to Stand:  contact guard assistance with rolling walker, NWB RLE, verbal cueing for hand placement and safe technique  Gait: patient ambulated total of 8 sidesteps with CGA to min assist using RW, NWB RLE      Treatment & Education:  Patient performed the following RLE exercises x 10 reps to include: spine AP, QS, SAQ, SLR, and hip abd; standing hip/knee flexion, hip ext. Patient ambulated total of 8 sidesteps with CGA to min using RW, NWB RLE. Patient tolerated treatment well on this date but continues to be limited by weakness and new onset amputation.   Patient educated on role of acute care PT and PT POC, safety while in hospital including calling nurse for mobility, call light usage, and NWB status of RLE  Elevation of RLE to decrease swelling    Patient left HOB elevated with all lines intact and call button in reach..    GOALS:   See initial PT evaluation    Time Tracking:     PT Received On: 04/05/23  PT Start Time: 0942     PT Stop Time: 1000  PT Total Time (min): 18 min     Billable Minutes: Therapeutic Activity 18 min    Treatment Type: Treatment  PT/PTA: PT           04/05/2023

## 2023-04-05 NOTE — ASSESSMENT & PLAN NOTE
S/P AMPUTATION, FOOT, TRANSMETATARSAL (Right) for acute on chronic osteomelitis  BCx NGTD  Stopped cefepime and flagyl  Continue vanc while wound culture pending  CT foot with abscess, cellulitis, likely early osteo    Microbiology Results (last 7 days)     Procedure Component Value Units Date/Time    Culture, Anaerobe [[id number]] Collected: 04/02/23 1107    Order Status: Completed Specimen: Incision site from Foot, Right Updated: 04/05/23 1043     Anaerobic Culture Culture in progress    Blood Culture #1 **CANNOT BE ORDERED STAT** [[id number]] Collected: 03/31/23 1925    Order Status: Completed Specimen: Blood from Peripheral, Antecubital, Right Updated: 04/04/23 1612     Blood Culture, Routine No Growth to date      No Growth to date      No Growth to date      No Growth to date    Blood Culture #2 **CANNOT BE ORDERED STAT** [[id number]] Collected: 03/31/23 1924    Order Status: Completed Specimen: Blood from Peripheral, Antecubital, Left Updated: 04/04/23 1612     Blood Culture, Routine No Growth to date      No Growth to date      No Growth to date      No Growth to date    Aerobic culture [[id number]]  (Abnormal) Collected: 04/02/23 1107    Order Status: Completed Specimen: Incision site from Foot, Right Updated: 04/04/23 0747     Aerobic Bacterial Culture STAPHYLOCOCCUS AUREUS  Many  Susceptibility pending      Influenza A & B by Molecular [[id number]]     Order Status: Canceled Specimen: Nasopharyngeal Swab     Influenza A & B by Molecular [[id number]] Collected: 03/31/23 1921    Order Status: Completed Specimen: Nasopharyngeal Swab Updated: 03/31/23 2015     Influenza A, Molecular Negative     Influenza B, Molecular Negative     Flu A & B Source Nasal Swab

## 2023-04-06 LAB
ANION GAP SERPL CALC-SCNC: 12 MMOL/L (ref 8–16)
BACTERIA BLD CULT: NORMAL
BACTERIA BLD CULT: NORMAL
BACTERIA SPEC AEROBE CULT: ABNORMAL
BASOPHILS # BLD AUTO: 0.04 K/UL (ref 0–0.2)
BASOPHILS NFR BLD: 0.4 % (ref 0–1.9)
BNP SERPL-MCNC: 390 PG/ML (ref 0–99)
BUN SERPL-MCNC: 21 MG/DL (ref 8–23)
CALCIUM SERPL-MCNC: 8.3 MG/DL (ref 8.7–10.5)
CHLORIDE SERPL-SCNC: 103 MMOL/L (ref 95–110)
CO2 SERPL-SCNC: 23 MMOL/L (ref 23–29)
CREAT SERPL-MCNC: 1.5 MG/DL (ref 0.5–1.4)
DIFFERENTIAL METHOD: ABNORMAL
EOSINOPHIL # BLD AUTO: 0.2 K/UL (ref 0–0.5)
EOSINOPHIL NFR BLD: 2.2 % (ref 0–8)
ERYTHROCYTE [DISTWIDTH] IN BLOOD BY AUTOMATED COUNT: 13.2 % (ref 11.5–14.5)
EST. GFR  (NO RACE VARIABLE): 51.7 ML/MIN/1.73 M^2
GLUCOSE SERPL-MCNC: 194 MG/DL (ref 70–110)
HCT VFR BLD AUTO: 26.2 % (ref 40–54)
HGB BLD-MCNC: 8.7 G/DL (ref 14–18)
IMM GRANULOCYTES # BLD AUTO: 0.07 K/UL (ref 0–0.04)
IMM GRANULOCYTES NFR BLD AUTO: 0.7 % (ref 0–0.5)
LYMPHOCYTES # BLD AUTO: 1 K/UL (ref 1–4.8)
LYMPHOCYTES NFR BLD: 9.9 % (ref 18–48)
MCH RBC QN AUTO: 28.5 PG (ref 27–31)
MCHC RBC AUTO-ENTMCNC: 33.2 G/DL (ref 32–36)
MCV RBC AUTO: 86 FL (ref 82–98)
MONOCYTES # BLD AUTO: 1.1 K/UL (ref 0.3–1)
MONOCYTES NFR BLD: 10.4 % (ref 4–15)
NEUTROPHILS # BLD AUTO: 7.9 K/UL (ref 1.8–7.7)
NEUTROPHILS NFR BLD: 76.4 % (ref 38–73)
NRBC BLD-RTO: 0 /100 WBC
PLATELET # BLD AUTO: 414 K/UL (ref 150–450)
PMV BLD AUTO: 10.8 FL (ref 9.2–12.9)
POCT GLUCOSE: 194 MG/DL (ref 70–110)
POCT GLUCOSE: 222 MG/DL (ref 70–110)
POCT GLUCOSE: 229 MG/DL (ref 70–110)
POCT GLUCOSE: 257 MG/DL (ref 70–110)
POCT GLUCOSE: 268 MG/DL (ref 70–110)
POTASSIUM SERPL-SCNC: 3.9 MMOL/L (ref 3.5–5.1)
RBC # BLD AUTO: 3.05 M/UL (ref 4.6–6.2)
SODIUM SERPL-SCNC: 138 MMOL/L (ref 136–145)
WBC # BLD AUTO: 10.36 K/UL (ref 3.9–12.7)

## 2023-04-06 PROCEDURE — 99233 PR SUBSEQUENT HOSPITAL CARE,LEVL III: ICD-10-PCS | Mod: ,,, | Performed by: STUDENT IN AN ORGANIZED HEALTH CARE EDUCATION/TRAINING PROGRAM

## 2023-04-06 PROCEDURE — 94761 N-INVAS EAR/PLS OXIMETRY MLT: CPT

## 2023-04-06 PROCEDURE — 27000221 HC OXYGEN, UP TO 24 HOURS

## 2023-04-06 PROCEDURE — 25000003 PHARM REV CODE 250: Performed by: STUDENT IN AN ORGANIZED HEALTH CARE EDUCATION/TRAINING PROGRAM

## 2023-04-06 PROCEDURE — 20000000 HC ICU ROOM

## 2023-04-06 PROCEDURE — 25000003 PHARM REV CODE 250: Performed by: PODIATRIST

## 2023-04-06 PROCEDURE — 83880 ASSAY OF NATRIURETIC PEPTIDE: CPT | Performed by: INTERNAL MEDICINE

## 2023-04-06 PROCEDURE — 85025 COMPLETE CBC W/AUTO DIFF WBC: CPT | Performed by: INTERNAL MEDICINE

## 2023-04-06 PROCEDURE — 63600175 PHARM REV CODE 636 W HCPCS: Performed by: INTERNAL MEDICINE

## 2023-04-06 PROCEDURE — 80048 BASIC METABOLIC PNL TOTAL CA: CPT | Performed by: INTERNAL MEDICINE

## 2023-04-06 PROCEDURE — 25000003 PHARM REV CODE 250: Performed by: INTERNAL MEDICINE

## 2023-04-06 PROCEDURE — 99900035 HC TECH TIME PER 15 MIN (STAT)

## 2023-04-06 PROCEDURE — 99233 SBSQ HOSP IP/OBS HIGH 50: CPT | Mod: ,,, | Performed by: STUDENT IN AN ORGANIZED HEALTH CARE EDUCATION/TRAINING PROGRAM

## 2023-04-06 RX ORDER — GABAPENTIN 600 MG/1
300 TABLET ORAL 3 TIMES DAILY
Qty: 45 TABLET | Refills: 2 | Status: SHIPPED | OUTPATIENT
Start: 2023-04-06 | End: 2023-05-10 | Stop reason: SDUPTHER

## 2023-04-06 RX ORDER — LISINOPRIL 2.5 MG/1
5 TABLET ORAL ONCE
Status: COMPLETED | OUTPATIENT
Start: 2023-04-06 | End: 2023-04-06

## 2023-04-06 RX ORDER — METFORMIN HYDROCHLORIDE 1000 MG/1
500 TABLET ORAL 2 TIMES DAILY WITH MEALS
Qty: 30 TABLET | Refills: 2 | Status: SHIPPED | OUTPATIENT
Start: 2023-04-06 | End: 2023-05-06

## 2023-04-06 RX ORDER — FUROSEMIDE 20 MG/1
20 TABLET ORAL DAILY
Qty: 30 TABLET | Refills: 2 | Status: SHIPPED | OUTPATIENT
Start: 2023-04-06 | End: 2023-07-05

## 2023-04-06 RX ORDER — LISINOPRIL 10 MG/1
10 TABLET ORAL DAILY
Status: DISCONTINUED | OUTPATIENT
Start: 2023-04-07 | End: 2023-04-07

## 2023-04-06 RX ORDER — CLINDAMYCIN HYDROCHLORIDE 150 MG/1
450 CAPSULE ORAL EVERY 6 HOURS
Qty: 336 CAPSULE | Refills: 0 | Status: SHIPPED | OUTPATIENT
Start: 2023-04-06 | End: 2023-04-07 | Stop reason: HOSPADM

## 2023-04-06 RX ORDER — OXYCODONE HYDROCHLORIDE 5 MG/1
5 TABLET ORAL EVERY 6 HOURS PRN
Qty: 20 TABLET | Refills: 0 | Status: SHIPPED | OUTPATIENT
Start: 2023-04-06 | End: 2023-04-11

## 2023-04-06 RX ORDER — LISINOPRIL 5 MG/1
5 TABLET ORAL DAILY
Qty: 90 TABLET | Refills: 0 | Status: SHIPPED | OUTPATIENT
Start: 2023-04-07 | End: 2023-04-07 | Stop reason: HOSPADM

## 2023-04-06 RX ADMIN — INSULIN ASPART 1 UNITS: 100 INJECTION, SOLUTION INTRAVENOUS; SUBCUTANEOUS at 09:04

## 2023-04-06 RX ADMIN — OXYCODONE 5 MG: 5 TABLET ORAL at 11:04

## 2023-04-06 RX ADMIN — LISINOPRIL 5 MG: 2.5 TABLET ORAL at 09:04

## 2023-04-06 RX ADMIN — OXYCODONE 5 MG: 5 TABLET ORAL at 08:04

## 2023-04-06 RX ADMIN — CLINDAMYCIN HYDROCHLORIDE 450 MG: 150 CAPSULE ORAL at 05:04

## 2023-04-06 RX ADMIN — INSULIN ASPART 3 UNITS: 100 INJECTION, SOLUTION INTRAVENOUS; SUBCUTANEOUS at 04:04

## 2023-04-06 RX ADMIN — GABAPENTIN 400 MG: 100 CAPSULE ORAL at 08:04

## 2023-04-06 RX ADMIN — FAMOTIDINE 20 MG: 20 TABLET ORAL at 09:04

## 2023-04-06 RX ADMIN — CLINDAMYCIN HYDROCHLORIDE 450 MG: 150 CAPSULE ORAL at 11:04

## 2023-04-06 RX ADMIN — GABAPENTIN 400 MG: 100 CAPSULE ORAL at 09:04

## 2023-04-06 RX ADMIN — HEPARIN SODIUM 5000 UNITS: 5000 INJECTION, SOLUTION INTRAVENOUS; SUBCUTANEOUS at 09:04

## 2023-04-06 RX ADMIN — LISINOPRIL 5 MG: 2.5 TABLET ORAL at 01:04

## 2023-04-06 RX ADMIN — INSULIN ASPART 2 UNITS: 100 INJECTION, SOLUTION INTRAVENOUS; SUBCUTANEOUS at 11:04

## 2023-04-06 RX ADMIN — CLINDAMYCIN HYDROCHLORIDE 450 MG: 150 CAPSULE ORAL at 12:04

## 2023-04-06 RX ADMIN — MUPIROCIN: 20 OINTMENT TOPICAL at 09:04

## 2023-04-06 RX ADMIN — OXYCODONE 5 MG: 5 TABLET ORAL at 05:04

## 2023-04-06 NOTE — SUBJECTIVE & OBJECTIVE
Interval History: NAEON. Cleared medically for discharge awaiting bed. Will switch abx to moxifloxacin at discharge    Review of Systems   All other systems reviewed and are negative.  Objective:     Vital Signs (Most Recent):  Temp: 98.1 °F (36.7 °C) (04/06/23 1145)  Pulse: 72 (04/06/23 1145)  Resp: 18 (04/06/23 1156)  BP: (!) 186/85 (04/06/23 1145)  SpO2: (!) 94 % (04/06/23 1145)   Vital Signs (24h Range):  Temp:  [97.2 °F (36.2 °C)-99.7 °F (37.6 °C)] 98.1 °F (36.7 °C)  Pulse:  [71-78] 72  Resp:  [16-18] 18  SpO2:  [92 %-96 %] 94 %  BP: (112-186)/(63-85) 186/85     Weight: 68.9 kg (152 lb)  Body mass index is 23.11 kg/m².    Intake/Output Summary (Last 24 hours) at 4/6/2023 1232  Last data filed at 4/6/2023 0526  Gross per 24 hour   Intake 360 ml   Output 1000 ml   Net -640 ml      Physical Exam  Vitals reviewed  General: NAD, Well developed, Well Nourished  Head: NC/AT  Eyes: EOMI, JAY  Cardiovascular: Regular Rate  Pulmonary: Normal Respiratory Rate, No respiratory distress  Gi: Soft, Non-tender  Extremities: Warm, No edema present, right foot wrapped with surgical dressing  Skin: Warm, dry  Neuro: Alert, No focal Deficit  Psych: Appropriate mood and affect      Significant Labs: All pertinent labs within the past 24 hours have been reviewed.    Significant Imaging: I have reviewed all pertinent imaging results/findings within the past 24 hours.

## 2023-04-06 NOTE — ASSESSMENT & PLAN NOTE
S/P AMPUTATION, FOOT, TRANSMETATARSAL (Right) for acute on chronic osteomelitis  BCx NGTD  Stopped cefepime and flagyl  Continue clinda while inpatient. Plan to switch to moxifloxacin at discharge  CT foot with abscess, cellulitis, likely early osteo    Microbiology Results (last 7 days)     Procedure Component Value Units Date/Time    Aerobic culture [[id number]]  (Abnormal)  (Susceptibility) Collected: 04/02/23 1107    Order Status: Completed Specimen: Incision site from Foot, Right Updated: 04/06/23 0805     Aerobic Bacterial Culture STAPHYLOCOCCUS AUREUS  Many      Blood Culture #1 **CANNOT BE ORDERED STAT** [[id number]] Collected: 03/31/23 1925    Order Status: Completed Specimen: Blood from Peripheral, Antecubital, Right Updated: 04/05/23 1612     Blood Culture, Routine No Growth to date      No Growth to date      No Growth to date      No Growth to date      No Growth to date    Blood Culture #2 **CANNOT BE ORDERED STAT** [[id number]] Collected: 03/31/23 1924    Order Status: Completed Specimen: Blood from Peripheral, Antecubital, Left Updated: 04/05/23 1612     Blood Culture, Routine No Growth to date      No Growth to date      No Growth to date      No Growth to date      No Growth to date    Culture, Anaerobe [[id number]] Collected: 04/02/23 1107    Order Status: Completed Specimen: Incision site from Foot, Right Updated: 04/05/23 1043     Anaerobic Culture Culture in progress    Influenza A & B by Molecular [[id number]]     Order Status: Canceled Specimen: Nasopharyngeal Swab     Influenza A & B by Molecular [[id number]] Collected: 03/31/23 1921    Order Status: Completed Specimen: Nasopharyngeal Swab Updated: 03/31/23 2015     Influenza A, Molecular Negative     Influenza B, Molecular Negative     Flu A & B Source Nasal Swab

## 2023-04-06 NOTE — PLAN OF CARE
[facility]    FACILITY TRANSFER ORDERS      Patient Name: Juan Pablo Chang  YOB: 1960    PCP: EDNA Butler   PCP Address: [address] [place] [address] [place] [address]  PCP Phone Number: [phone]  PCP Fax: [phone]    Encounter Date: 04/06/2023    Admit to: Inpatient Rehab    Vital Signs:  Routine    Diagnoses:   Active Hospital Problems    Diagnosis  POA    *Cellulitis and abscess of toe of right foot [L03.031, L02.611]  Yes    Acute respiratory failure with hypoxia [J96.01]  No    Abscess of bursa of right foot [M71.071]  Yes    Chronic multifocal osteomyelitis of right foot [M86.371]  Yes    Acute bronchitis [J20.9]  Yes    Cellulitis of right lower extremity [L03.115]  Yes    Elevated troponin [R77.8]  Yes    Fever [R50.9]  Yes    STERLING (acute kidney injury) [N17.9]  Yes    Orthostatic hypotension [I95.1]  Yes    Dislocated toe [S93.106A]  Yes    Type 2 diabetes mellitus with hypoglycemia without coma, without long-term current use of insulin [E11.649]  Yes    Multiple falls [R29.6]  Not Applicable      Resolved Hospital Problems   No resolved problems to display.       Allergies:Review of patient's allergies indicates:  No Known Allergies    Diet: diabetic diet: 1800 calorie    Activities: Activity as tolerated    Goals of Care Treatment Preferences:  Code Status: Full Code      Nursing: Per unit protocol     Labs: BMP  weekly      CONSULTS:    Physical Therapy to evaluate and treat. , Occupational Therapy to evaluate and treat., and  to evaluate for community resources/long-range planning.    MISCELLANEOUS CARE:  Routine Skin for Bedridden Patients: Apply moisture barrier cream to all skin folds and wet areas in perineal area daily and after baths and all bowel movements. and Diabetes Care:   SN to perform and educate Diabetic management with blood glucose monitoring:, Fingerstick blood sugar a.m. and p.m., and Report CBG < 60 or > 350 to  physician.    Orthopedic Precautions: RLE non weight bearing    WOUND CARE ORDERS  Yes: Wound care at podiatry clinic    Medications: Review discharge medications with patient and family and provide education.      Current Discharge Medication List        START taking these medications    Details   furosemide (LASIX) 20 MG tablet Take 1 tablet (20 mg total) by mouth once daily.  Qty: 30 tablet, Refills: 2      lisinopriL (PRINIVIL,ZESTRIL) 20 MG tablet Take 1 tablet (20 mg total) by mouth once daily.  Qty: 90 tablet, Refills: 3    Comments: .      moxifloxacin (AVELOX) 400 mg tablet Take 1 tablet (400 mg total) by mouth once daily.  Qty: 28 tablet, Refills: 0      oxyCODONE (ROXICODONE) 5 MG immediate release tablet Take 1 tablet (5 mg total) by mouth every 6 (six) hours as needed for Pain.  Qty: 20 tablet, Refills: 0    Comments: n/a            CONTINUE these medications which have CHANGED    Details   gabapentin (NEURONTIN) 600 MG tablet Take 0.5 tablets (300 mg total) by mouth 3 (three) times daily.  Qty: 45 tablet, Refills: 2      metFORMIN (GLUCOPHAGE) 1000 MG tablet Take 0.5 tablets (500 mg total) by mouth 2 (two) times daily with meals.  Qty: 30 tablet, Refills: 2           CONTINUE these medications which have NOT CHANGED    Details   glipiZIDE (GLUCOTROL) 10 MG tablet Take 10 mg by mouth 2 (two) times daily before meals.           STOP taking these medications       INV LISINOPRIL-HCTZ 20-25 Comments:   Reason for Stopping:         promethazine (PHENERGAN) 25 MG tablet Comments:   Reason for Stopping:                  Immunizations Administered as of 4/6/2023       No immunizations on file.                 Some patients may experience side effects after vaccination.  These may include fever, headache, muscle or joint aches.  Most symptoms resolve with 24-48 hours and do not require urgent medical evaluation unless they persist for more than 72 hours or symptoms are concerning for an unrelated medical  condition.          _________________________________  Devin Baker MD  04/06/2023

## 2023-04-06 NOTE — PLAN OF CARE
Problem: Adult Inpatient Plan of Care  Goal: Plan of Care Review  4/6/2023 0516 by Alexia Issa RN  Outcome: Ongoing, Progressing  4/6/2023 0201 by Alexia Issa RN  Outcome: Ongoing, Progressing  Goal: Patient-Specific Goal (Individualized)  4/6/2023 0516 by Alexia Issa RN  Outcome: Ongoing, Progressing  4/6/2023 0201 by Alexia Issa RN  Outcome: Ongoing, Progressing  Goal: Absence of Hospital-Acquired Illness or Injury  4/6/2023 0516 by Alexia Issa RN  Outcome: Ongoing, Progressing  4/6/2023 0201 by Alexia Issa RN  Outcome: Ongoing, Progressing  Goal: Optimal Comfort and Wellbeing  4/6/2023 0516 by Alexia Issa RN  Outcome: Ongoing, Progressing  4/6/2023 0201 by Alexia Issa RN  Outcome: Ongoing, Progressing  Goal: Readiness for Transition of Care  4/6/2023 0516 by Alexia Issa RN  Outcome: Ongoing, Progressing  4/6/2023 0201 by Alexia Issa RN  Outcome: Ongoing, Progressing     Problem: Impaired Wound Healing  Goal: Optimal Wound Healing  4/6/2023 0516 by Alexia Issa RN  Outcome: Ongoing, Progressing  4/6/2023 0201 by Alexia Issa RN  Outcome: Ongoing, Progressing     Problem: Infection  Goal: Absence of Infection Signs and Symptoms  4/6/2023 0516 by Alexia Issa RN  Outcome: Ongoing, Progressing  4/6/2023 0201 by Alexia Issa RN  Outcome: Ongoing, Progressing     Problem: Fluid and Electrolyte Imbalance (Acute Kidney Injury/Impairment)  Goal: Fluid and Electrolyte Balance  4/6/2023 0516 by Alexia Issa RN  Outcome: Ongoing, Progressing  4/6/2023 0201 by Alexia Issa RN  Outcome: Ongoing, Progressing     Problem: Oral Intake Inadequate (Acute Kidney Injury/Impairment)  Goal: Optimal Nutrition Intake  4/6/2023 0516 by Alexia Issa RN  Outcome: Ongoing, Progressing  4/6/2023 0201 by Alexia Issa RN  Outcome: Ongoing, Progressing     Problem: Renal Function Impairment (Acute Kidney Injury/Impairment)  Goal: Effective Renal Function  4/6/2023 0516 by Alexia Issa RN  Outcome: Ongoing,  Progressing  4/6/2023 0201 by Alexia Issa RN  Outcome: Ongoing, Progressing     Problem: Diabetes Comorbidity  Goal: Blood Glucose Level Within Targeted Range  4/6/2023 0516 by Alexia Issa RN  Outcome: Ongoing, Progressing  4/6/2023 0201 by Alexia Issa RN  Outcome: Ongoing, Progressing     Problem: Fall Injury Risk  Goal: Absence of Fall and Fall-Related Injury  4/6/2023 0516 by Alexia Issa RN  Outcome: Ongoing, Progressing  4/6/2023 0201 by Alexia Issa RN  Outcome: Ongoing, Progressing     Problem: Skin Injury Risk Increased  Goal: Skin Health and Integrity  4/6/2023 0516 by Alexia Issa RN  Outcome: Ongoing, Progressing  4/6/2023 0201 by Alexia Issa RN  Outcome: Ongoing, Progressing     Problem: Skin or Soft Tissue Infection  Goal: Absence of Infection Signs and Symptoms  4/6/2023 0516 by Alexia Issa RN  Outcome: Ongoing, Progressing  4/6/2023 0201 by Alexia Issa RN  Outcome: Ongoing, Progressing   POC reviewed at bedside. Questions and concerns addressed. VSS. Placed bed in low and locked position. Call light within reach. Side rails up x2. Instructed to call for any needs. Verbalized understanding of all instructions. Frequent rounds.

## 2023-04-06 NOTE — PROGRESS NOTES
"CARDIOLOGY PROGRESS NOTE    Patient Name:  Juan Pablo Chang    :  1960    Medical Record:  [id number]    DATE OF SERVICE  2023        SUBJECTIVE  The patient is being seen for follow-up status post HFpEF EF 55%.  Hypertension elevated blood pressure 186/65 given lisinopril 10 mg.  Recent renal failure creatinine 1.5 improved      REVIEW OF SYSTEMS  General: No chills, No fever, No fatigue  Eyes: No vision changes, No drainage from eyes  ENT: No nasal congestion, no sore throat  Respiratory: No shortness of breath, No cough  Cardiac: No chest pain, palpitations, dyspnea, dizziness, claudication, or syncopal episodes  GI: No abdominal pain, no nausea, no vomiting  : No dysuria  Musculoskeletal: No joint pain + right foot pain  Neuro: No weakness, dizziness, vision changes       OBJECTIVE          PHYSICAL EXAM  Vital Signs:  BP (!) 186/85 (BP Location: Left arm, Patient Position: Lying)   Pulse 72   Temp 98.1 °F (36.7 °C) (Oral)   Resp 18   Ht 5' 8" (1.727 m)   Wt 68.9 kg (152 lb)   SpO2 (!) 94%   BMI 23.11 kg/m²   Temp:  [97.2 °F (36.2 °C)-99.7 °F (37.6 °C)] 98.1 °F (36.7 °C)  Pulse:  [71-78] 72  Resp:  [16-18] 18  SpO2:  [92 %-96 %] 94 %  BP: (112-186)/(63-85) 186/85      General:   Eyes: Anicteric sclera. Moist conjunctiva. Vision grossly intact.  HENMT: Normocephalic.  Moist mucous membranes. No obvious ulcerations.   Neck: Trachea midline.   Cardiovascular: Heart regular rate and rhythm. S1, S2, no murmurs.  Peripheral pulses palpable. No peripheral edema.   Respiratory: Lungs clear to auscultation bilaterally. No increased work of breathing.  Gastrointestinal: Bowel sounds active in all 4 quadrants. Soft, nontender, nondistended.   Genitourinary: Urine clear yellow  Musculoskeletal: Moves all extremities with equal strength.   Skin: Color normal for ethnicity. Skin warm and dry with good turgor. Capillary refill < 3 seconds.   Neurologic: Oriented x 3.  Speech fluent, follows " commands.  Psychiatric:  Awake and alert, normal affect mood good.      LABS    CBC  Recent Labs   Lab 04/04/23  0509 04/05/23  0415 04/06/23 0449   WBC 12.01 10.43 10.36   RBC 3.01* 3.04* 3.05*   HGB 8.7* 8.7* 8.7*   HCT 26.0* 26.1* 26.2*   MCV 86 86 86   MCH 28.9 28.6 28.5   MCHC 33.5 33.3 33.2   RDW 13.2 13.2 13.2   MPV 10.9 10.8 10.8    366 414       Chemistry  Recent Labs   Lab 03/31/23  1750 04/01/23  0435 04/02/23  0331 04/03/23  0453 04/03/23  1637 04/04/23  0509 04/05/23 0415 04/06/23 0449   *   < > 140 138  138   < > 137 136 138   K 4.4   < > 4.4 4.6  4.6   < > 4.3 3.6 3.9      < > 110 109  109   < > 105 103 103   CO2 24   < > 18* 21*  21*   < > 21* 25 23   BUN 32*   < > 21 22  22   < > 21 22 21   CREATININE 2.3*   < > 1.7* 1.6*  1.6*   < > 1.6* 1.7* 1.5*   *   < > 128* 155*  155*   < > 154* 237* 194*   PROT 7.3  --  6.4 6.0  6.0  --   --   --   --    CALCIUM 9.2   < > 8.5* 8.4*  8.4*   < > 8.4* 8.3* 8.3*   BILITOT 0.8  --  0.7 0.5  0.5  --   --   --   --    AST 30  --  24 18  18  --   --   --   --    ALT 21  --  18 15  15  --   --   --   --     < > = values in this interval not displayed.       ABG  No results for input(s): PHART, APZ2IKF, PO2ART, MHG7MXG, R9ZCICLU, BEART, K0KBEUXW in the last 168 hours.      MICROBIOLOGY  Reviewed    IMAGING & OTHER STUDIES  Reviewed      Intake/Output last 3 shifts:  I/O last 3 completed shifts:  In: 360 [P.O.:360]  Out: 1000 [Urine:1000]    Scheduled meds:   clindamycin  450 mg Oral Q6H    famotidine  20 mg Oral Daily    gabapentin  400 mg Oral BID    heparin (porcine)  5,000 Units Subcutaneous Q12H    [START ON 4/7/2023] lisinopriL  10 mg Oral Daily    lisinopriL  5 mg Oral Once    mupirocin   Nasal BID       PRN Meds:  acetaminophen, dextrose 10%, dextrose 10%, dextrose, dextrose, guaiFENesin 100 mg/5 ml, insulin aspart U-100, naloxone, oxyCODONE, sodium chloride 0.9%    Continuous Infusions:      Dietary Orders:  [unfilled]      Admit weight: Weight: 65.8 kg (145 lb)   Today weight: Weight: 68.9 kg (152 lb)      Length of stay in days: 4      ASSESSMENT    Active Hospital Problems    Diagnosis  POA    *Cellulitis and abscess of toe of right foot [L03.031, L02.611]  Yes    Acute respiratory failure with hypoxia [J96.01]  No    Abscess of bursa of right foot [M71.071]  Yes    Chronic multifocal osteomyelitis of right foot [M86.371]  Yes    Acute bronchitis [J20.9]  Yes    Cellulitis of right lower extremity [L03.115]  Yes    Elevated troponin [R77.8]  Yes    Fever [R50.9]  Yes    STERLING (acute kidney injury) [N17.9]  Yes    Orthostatic hypotension [I95.1]  Yes    Dislocated toe [S93.106A]  Yes    Type 2 diabetes mellitus with hypoglycemia without coma, without long-term current use of insulin [E11.649]  Yes    Multiple falls [R29.6]  Not Applicable      Resolved Hospital Problems   No resolved problems to display.          PLAN    Elevated troponin  Increased troponin 0.58  No chest pain  Sinus right bundle-branch block     Decreased saturation room air  Chest x-ray interstitial disease        Partial amputation of foot right  Dr. Donaldson  04/02/2023     Type 2 diabetes     Renal failure  BUN 21 creatinine 1.6 4/4     Elevated blood pressure  186/68  Given 10 mg of lisinopril     Anemia  Hemoglobin 8.7 4/5  Pepcid 20 mg p.o. daily    SBE prophylaxis  Heparin 5000 subQ b.i.d.     Plan  Monitor blood pressure renal function  Discussed with Dr. Baker                Electronically signed by: Rohit Marsh, 4/6/2023 12:52 PM

## 2023-04-06 NOTE — PROGRESS NOTES
[facility] Medicine  Progress Note    Patient Name: Juan Pablo Chang  MRN: [id number]  Patient Class: IP- Inpatient   Admission Date: 3/31/2023  Length of Stay: 4 days  Attending Physician: Devin Baker MD  Primary Care Provider: EDNA Butler        Subjective:     Principal Problem:Cellulitis and abscess of toe of right foot        HPI:  The patient is a 61 y/o male with PMH of T2DM, neuropathy, and HTN who presented with dizziness and falls. He states falling about 8 times today. He reports dizziness and balance issues over the past 4 weeks. On presentation to the ER he was found to be orthostatic. Patient also found to have 3rd metatarsal fracture and dislocation of the 2nd toe. The second toe also appears to be ecchymotic and dusky. He reports that the toe had been like this for several months. He also had a temp of 101 in the ER.       Overview/Hospital Course:  Admitted to hospital for orthostatic hypotension, elevated troponin, and multiple falls several times a day as well.  He had a mildly elvated troponin which down trended, TTE ordered and pending. On further questioning, patient is adamant he is falling because he is unstable on his feet, is not passing out, and denies chest pain.  He has history of diabetic foot infection s/p amputation of right great toe with diabetic neuropathy.  His 2nd toe found to be ecchymotic, swollen, warm to touch but not painful.  Had fever noted in ED visit, BCx drawn, lactate wnl, antibiotics deferred on admission. Xrays showed 2nd digit dislocation as well as soft tissue swelling and questionable gas concerning for possible cellulitis or early necrotizing fasciitis.  Orthopedic surgery consulted in am, recommended IV abx, MRI, and vascular surgery evaluation for possible deep space infection and surgical debridement/amputation.  Obtained inflammatory markers, which were significantly elevated.  Started on broad spectrum antibiotics, CT foot  ordered to evaluate deep space infection, which showed abscess and possible early osteomyelitis, but no evidence of gas gangrene or necrotizing infection.  Continued to spike fevers while on antibiotics. Podiatry consulted for surgical evaluation and infection source control, and plan to take patient to OR today for ray vs TMA.  ABIs and US arterial ordered for vascular status but has easily palpable pulses.  Did complain of increased cough overnight, repeat CXR concerning for pulmonary interstitial disease, started on breathing treatments and mucinex.  Troponin rechecked overnight and increased with stable TWI on EKG, denied any new chest pain, cardiology consulted.        Interval History: NAEON. Cleared medically for discharge awaiting bed. Will switch abx to moxifloxacin at discharge    Review of Systems   All other systems reviewed and are negative.  Objective:     Vital Signs (Most Recent):  Temp: 98.1 °F (36.7 °C) (04/06/23 1145)  Pulse: 72 (04/06/23 1145)  Resp: 18 (04/06/23 1156)  BP: (!) 186/85 (04/06/23 1145)  SpO2: (!) 94 % (04/06/23 1145)   Vital Signs (24h Range):  Temp:  [97.2 °F (36.2 °C)-99.7 °F (37.6 °C)] 98.1 °F (36.7 °C)  Pulse:  [71-78] 72  Resp:  [16-18] 18  SpO2:  [92 %-96 %] 94 %  BP: (112-186)/(63-85) 186/85     Weight: 68.9 kg (152 lb)  Body mass index is 23.11 kg/m².    Intake/Output Summary (Last 24 hours) at 4/6/2023 1232  Last data filed at 4/6/2023 0526  Gross per 24 hour   Intake 360 ml   Output 1000 ml   Net -640 ml      Physical Exam  Vitals reviewed  General: NAD, Well developed, Well Nourished  Head: NC/AT  Eyes: EOMI, JAY  Cardiovascular: Regular Rate  Pulmonary: Normal Respiratory Rate, No respiratory distress  Gi: Soft, Non-tender  Extremities: Warm, No edema present, right foot wrapped with surgical dressing  Skin: Warm, dry  Neuro: Alert, No focal Deficit  Psych: Appropriate mood and affect      Significant Labs: All pertinent labs within the past 24 hours have been  reviewed.    Significant Imaging: I have reviewed all pertinent imaging results/findings within the past 24 hours.      Assessment/Plan:      * Cellulitis and abscess of toe of right foot  S/P AMPUTATION, FOOT, TRANSMETATARSAL (Right) for acute on chronic osteomelitis  BCx NGTD  Stopped cefepime and flagyl  Continue clinda while inpatient. Plan to switch to moxifloxacin at discharge  CT foot with abscess, cellulitis, likely early osteo    Microbiology Results (last 7 days)     Procedure Component Value Units Date/Time    Aerobic culture [[id number]]  (Abnormal)  (Susceptibility) Collected: 04/02/23 1107    Order Status: Completed Specimen: Incision site from Foot, Right Updated: 04/06/23 0805     Aerobic Bacterial Culture STAPHYLOCOCCUS AUREUS  Many      Blood Culture #1 **CANNOT BE ORDERED STAT** [[id number]] Collected: 03/31/23 1925    Order Status: Completed Specimen: Blood from Peripheral, Antecubital, Right Updated: 04/05/23 1612     Blood Culture, Routine No Growth to date      No Growth to date      No Growth to date      No Growth to date      No Growth to date    Blood Culture #2 **CANNOT BE ORDERED STAT** [[id number]] Collected: 03/31/23 1924    Order Status: Completed Specimen: Blood from Peripheral, Antecubital, Left Updated: 04/05/23 1612     Blood Culture, Routine No Growth to date      No Growth to date      No Growth to date      No Growth to date      No Growth to date    Culture, Anaerobe [[id number]] Collected: 04/02/23 1107    Order Status: Completed Specimen: Incision site from Foot, Right Updated: 04/05/23 1043     Anaerobic Culture Culture in progress    Influenza A & B by Molecular [[id number]]     Order Status: Canceled Specimen: Nasopharyngeal Swab     Influenza A & B by Molecular [[id number]] Collected: 03/31/23 1921    Order Status: Completed Specimen: Nasopharyngeal Swab Updated: 03/31/23 2015     Influenza A, Molecular Negative     Influenza B, Molecular Negative     Flu A & B Source Nasal  Swab            Acute respiratory failure with hypoxia  Acute hypoxic respiratory failure likely 2/2 volume overload w/ HFpEF exacerbation  BNP elevated and patient w/ O2 requirement  TTE completed  Appears to have resolved    Acute bronchitis  Flu negative in ED  Worsening cough, did desat but not requiring O2   Repeat CXR concerning for interstitial dx  Symptomatic treatment with duonebs and mucinex  On broad spectrum abx for foot infection, consider adding atypical coverage if respiratory sx worsen      Multiple falls  Orthostasis improved.   Suspect mechanical component due to neuropathy and RGT amputation   NWB RLE  PT/OT consulted- recommend IPR    Type 2 diabetes mellitus with hypoglycemia without coma, without long-term current use of insulin  Patient's FSGs are controlled on current medication regimen.  Last A1c reviewed-   Lab Results   Component Value Date    HGBA1C 6.5 (H) 03/31/2023     Most recent fingerstick glucose reviewed-   Recent Labs   Lab 04/04/23  1647 04/04/23  2118 04/05/23  0753 04/05/23  1032   POCTGLUCOSE 260* 363* 212* 284*     Current correctional scale  Low  Maintain anti-hyperglycemic dose as follows-   Antihyperglycemics (From admission, onward)    Start     Stop Route Frequency Ordered    04/01/23 1214  insulin aspart U-100 pen 0-5 Units         -- SubQ Before meals & nightly PRN 04/01/23 1115        Hold Oral hypoglycemics while patient is in the hospital.    Not requiring any insulin    Dislocated toe  See cellulitis/abscess      Orthostatic hypotension  Possibly in setting of sepsis/infection   Now improved after IVF  Hold lisinopril-HCTZ, restart as able with STERLING  Telemetry     STERLING (acute kidney injury)  Lab Results   Component Value Date    CREATININE 1.7 (H) 04/05/2023     Sr Cr elevated, unknown baseline (suspect some CKD)  Daily CMP  Avoid nephrotoxins.   Renally dose all medications   Monitor events that may lead to decreased renal perfusion (hypovolemia, hypotension,  sepsis).   Monitor urine output (goal 0.5 mL/kg/hr) to assure that no obstruction precipitates worsening in GFR.          Fever  See cellulitis management      Elevated troponin  Repeat trop did increase overnight, patient remains asymptomatic   Repeat EKG with stable TWI and non pathologic q waves per my read, no STEMI criteria  TTE completed  Cards consulted, appreciate recs      NSTEMI (non-ST elevated myocardial infarction)            VTE Risk Mitigation (From admission, onward)         Ordered     heparin (porcine) injection 5,000 Units  Every 12 hours         04/05/23 1047                Discharge Planning   MARION: 4/6/2023     Code Status: Full Code   Is the patient medically ready for discharge?:     Reason for patient still in hospital (select all that apply): Pending disposition  Discharge Plan A: Rehab   Discharge Delays: None known at this time              Devin Baker MD  Department of Hospital Medicine   [place] - [facility]ensive [facility]

## 2023-04-06 NOTE — PLAN OF CARE
[facility] - Intensive Care  Discharge Final Note    Primary Care Provider: EDNA Butler    Expected Discharge Date: 4/7/2023  Verbal & written follow up appointments with Dr Donaldson, Dr Marsh & Dr Clark provided to patient. Demonstrated understanding by verbal feedback. Medicaid has approved inpatient rehab at [facility]. Patient's brother will bring him. Bed won't be available until Friday at 2:00pm. Provided nurse with phone number to call report tomorrow & packet to send with patient.  No other needs at this time  Final Discharge Note (most recent)       Final Note - 04/06/23 1340          Final Note    Assessment Type Final Discharge Note     Anticipated Discharge Disposition Rehab Facility     What phone number can be called within the next 1-3 days to see how you are doing after discharge? [phone]     Hospital Resources/Appts/Education Provided Appointments scheduled and added to AVS        Post-Acute Status    Post-Acute Authorization Placement     Post-Acute Placement Status Pending Bed Availability     Discharge Delays None known at this time                     Important Message from Medicare             Contact Info       WILLIAN Encarnacion    [address] [place], [place] [address], [place] [address]  [phone]       Next Steps: Go on 4/18/2023    Instructions: Follow up appointment scheduled for Tuesday, April 18th at 2:00 pm.    Orion Donaldson DPM   Specialty: Podiatry    [address] [facility] [place] [address]   Phone: [phone]       Next Steps: Go on 4/17/2023    Instructions: appointment Monday April 17th at 11:00am for surgery follow up    Dr. Rohit Marsh    [address] [facility], [place] [id number] [phone]       Next Steps: Go on 4/25/2023    Instructions: Cardiology follow up scheduled for Tuesday, April 25th at 11;15 am.

## 2023-04-07 VITALS
OXYGEN SATURATION: 96 % | TEMPERATURE: 98 F | RESPIRATION RATE: 18 BRPM | WEIGHT: 152 LBS | DIASTOLIC BLOOD PRESSURE: 86 MMHG | SYSTOLIC BLOOD PRESSURE: 183 MMHG | HEIGHT: 68 IN | HEART RATE: 72 BPM | BODY MASS INDEX: 23.04 KG/M2

## 2023-04-07 LAB
BACTERIA SPEC ANAEROBE CULT: NORMAL
POCT GLUCOSE: 179 MG/DL (ref 70–110)
POCT GLUCOSE: 195 MG/DL (ref 70–110)

## 2023-04-07 PROCEDURE — 94761 N-INVAS EAR/PLS OXIMETRY MLT: CPT

## 2023-04-07 PROCEDURE — 63600175 PHARM REV CODE 636 W HCPCS: Performed by: INTERNAL MEDICINE

## 2023-04-07 PROCEDURE — 99900035 HC TECH TIME PER 15 MIN (STAT)

## 2023-04-07 PROCEDURE — 25000003 PHARM REV CODE 250: Performed by: PODIATRIST

## 2023-04-07 PROCEDURE — 25000003 PHARM REV CODE 250: Performed by: STUDENT IN AN ORGANIZED HEALTH CARE EDUCATION/TRAINING PROGRAM

## 2023-04-07 PROCEDURE — 99239 HOSP IP/OBS DSCHRG MGMT >30: CPT | Mod: ,,, | Performed by: STUDENT IN AN ORGANIZED HEALTH CARE EDUCATION/TRAINING PROGRAM

## 2023-04-07 PROCEDURE — 99239 PR HOSPITAL DISCHARGE DAY,>30 MIN: ICD-10-PCS | Mod: ,,, | Performed by: STUDENT IN AN ORGANIZED HEALTH CARE EDUCATION/TRAINING PROGRAM

## 2023-04-07 PROCEDURE — 25000003 PHARM REV CODE 250: Performed by: INTERNAL MEDICINE

## 2023-04-07 RX ORDER — CARVEDILOL 3.12 MG/1
6.25 TABLET ORAL 2 TIMES DAILY
Status: DISCONTINUED | OUTPATIENT
Start: 2023-04-07 | End: 2023-04-07

## 2023-04-07 RX ORDER — LISINOPRIL 10 MG/1
10 TABLET ORAL ONCE
Status: DISCONTINUED | OUTPATIENT
Start: 2023-04-07 | End: 2023-04-07 | Stop reason: HOSPADM

## 2023-04-07 RX ORDER — MOXIFLOXACIN HYDROCHLORIDE 400 MG/1
400 TABLET ORAL DAILY
Qty: 28 TABLET | Refills: 0 | Status: SHIPPED | OUTPATIENT
Start: 2023-04-07 | End: 2023-04-17 | Stop reason: SDUPTHER

## 2023-04-07 RX ORDER — LISINOPRIL 20 MG/1
20 TABLET ORAL DAILY
Qty: 90 TABLET | Refills: 3 | Status: SHIPPED | OUTPATIENT
Start: 2023-04-08 | End: 2023-05-03

## 2023-04-07 RX ORDER — LISINOPRIL 20 MG/1
20 TABLET ORAL DAILY
Status: DISCONTINUED | OUTPATIENT
Start: 2023-04-08 | End: 2023-04-07 | Stop reason: HOSPADM

## 2023-04-07 RX ADMIN — CLINDAMYCIN HYDROCHLORIDE 450 MG: 150 CAPSULE ORAL at 12:04

## 2023-04-07 RX ADMIN — OXYCODONE 5 MG: 5 TABLET ORAL at 07:04

## 2023-04-07 RX ADMIN — CLINDAMYCIN HYDROCHLORIDE 450 MG: 150 CAPSULE ORAL at 05:04

## 2023-04-07 RX ADMIN — GABAPENTIN 400 MG: 100 CAPSULE ORAL at 09:04

## 2023-04-07 RX ADMIN — HEPARIN SODIUM 5000 UNITS: 5000 INJECTION, SOLUTION INTRAVENOUS; SUBCUTANEOUS at 09:04

## 2023-04-07 RX ADMIN — MUPIROCIN: 20 OINTMENT TOPICAL at 09:04

## 2023-04-07 RX ADMIN — FAMOTIDINE 20 MG: 20 TABLET ORAL at 09:04

## 2023-04-07 RX ADMIN — LISINOPRIL 10 MG: 10 TABLET ORAL at 09:04

## 2023-04-07 NOTE — PLAN OF CARE
Problem: Adult Inpatient Plan of Care  Goal: Plan of Care Review  Outcome: Ongoing, Progressing  Goal: Patient-Specific Goal (Individualized)  Outcome: Ongoing, Progressing  Goal: Absence of Hospital-Acquired Illness or Injury  Outcome: Ongoing, Progressing  Goal: Optimal Comfort and Wellbeing  Outcome: Ongoing, Progressing  Goal: Readiness for Transition of Care  Outcome: Ongoing, Progressing     Problem: Impaired Wound Healing  Goal: Optimal Wound Healing  Outcome: Ongoing, Progressing     Problem: Infection  Goal: Absence of Infection Signs and Symptoms  Outcome: Ongoing, Progressing     Problem: Fluid and Electrolyte Imbalance (Acute Kidney Injury/Impairment)  Goal: Fluid and Electrolyte Balance  Outcome: Ongoing, Progressing     Problem: Oral Intake Inadequate (Acute Kidney Injury/Impairment)  Goal: Optimal Nutrition Intake  Outcome: Ongoing, Progressing     Problem: Renal Function Impairment (Acute Kidney Injury/Impairment)  Goal: Effective Renal Function  Outcome: Ongoing, Progressing     Problem: Diabetes Comorbidity  Goal: Blood Glucose Level Within Targeted Range  Outcome: Ongoing, Progressing     Problem: Fall Injury Risk  Goal: Absence of Fall and Fall-Related Injury  Outcome: Ongoing, Progressing     Problem: Skin Injury Risk Increased  Goal: Skin Health and Integrity  Outcome: Ongoing, Progressing     Problem: Skin or Soft Tissue Infection  Goal: Absence of Infection Signs and Symptoms  Outcome: Ongoing, Progressing     Problem: Gas Exchange Impaired  Goal: Optimal Gas Exchange  Outcome: Ongoing, Progressing   POC reviewed at bedside. Questions and concerns addressed. VSS. Placed bed in low and locked position. Call light within reach. Side rails up x2. Instructed to call for any needs. Verbalized understanding of all instructions. Frequent rounds.

## 2023-04-07 NOTE — NURSING
Report called to Mercedes at [facility]. Pt going to room 937. Brother to provide transportation when room is ready.

## 2023-04-07 NOTE — DISCHARGE SUMMARY
[facility] Medicine  Discharge Summary      Patient Name: Juan Pablo Chang  MRN: [id number]  JULIÁN: [id number]  Patient Class: IP- Inpatient  Admission Date: 3/31/2023  Hospital Length of Stay: 5 days  Discharge Date and Time: No discharge date for patient encounter.  Attending Physician: Devin Baker MD   Discharging Provider: Devin Baker MD  Primary Care Provider: EDNA Butler    Primary Care Team: Networked reference to record PCT     HPI:   The patient is a 61 y/o male with PMH of T2DM, neuropathy, and HTN who presented with dizziness and falls. He states falling about 8 times today. He reports dizziness and balance issues over the past 4 weeks. On presentation to the ER he was found to be orthostatic. Patient also found to have 3rd metatarsal fracture and dislocation of the 2nd toe. The second toe also appears to be ecchymotic and dusky. He reports that the toe had been like this for several months. He also had a temp of 101 in the ER.       Procedure(s) (LRB):  AMPUTATION, FOOT, TRANSMETATARSAL (Right)      Hospital Course:   Admitted to hospital for orthostatic hypotension, elevated troponin, and multiple falls several times a day as well.  He had a mildly elvated troponin which down trended, TTE ordered and pending. On further questioning, patient is adamant he is falling because he is unstable on his feet, is not passing out, and denies chest pain.  He has history of diabetic foot infection s/p amputation of right great toe with diabetic neuropathy.  His 2nd toe found to be ecchymotic, swollen, warm to touch but not painful.  Had fever noted in ED visit, BCx drawn, lactate wnl, antibiotics deferred on admission. Xrays showed 2nd digit dislocation as well as soft tissue swelling and questionable gas concerning for possible cellulitis or early necrotizing fasciitis.  Orthopedic surgery consulted in am, recommended IV abx, MRI, and vascular surgery evaluation for  possible deep space infection and surgical debridement/amputation.  Obtained inflammatory markers, which were significantly elevated.  Started on broad spectrum antibiotics, CT foot ordered to evaluate deep space infection, which showed abscess and possible early osteomyelitis, but no evidence of gas gangrene or necrotizing infection.  Continued to spike fevers while on antibiotics. Podiatry consulted for surgical evaluation and infection source control, and plan to taken to OR for transmetatarsal amputation of right foot. Cultures taken. Eventually grew staph sensitive to moxifloxacin. Podiatry recommended patient being sent out on this antibiotic for 4 weeks. Patient with evidence of volume overload with acute on chronic HFpEF exacerbation causing acute hypoxic respiratory failure. This has resolved with IV lasix with cardiology guidance. Blood pressure medications have been uptitrated while patient has been admitted. Will require further uptitration in outpatient setting. Patient is cleared medically for discharge. Needs repeat BMP, Mg in 1 week to assess electrolytes and renal function. Results should be faxed to PCP and to cardiologist if able.       Goals of Care Treatment Preferences:  Code Status: Full Code      Consults:   Consults (From admission, onward)        Status Ordering Provider     Inpatient consult to Case Management  Once        Provider:  (Not yet assigned)    Acknowledged DENI WESTBROOK     Inpatient consult to Cardiology  Once        Provider:  Rohit Marsh MD    Completed CHAYITO RODRIGUEZ     Inpatient consult to Podiatry  Once        Provider:  Deni Westbrook DPM    Acknowledged DENI WESTBROOK     Inpatient consult to Orthopedic Surgery  Once        Provider:  Bassam Echeverria MD    Completed CHAYITO RODRIGUEZ          No new Assessment & Plan notes have been filed under this hospital service since the last note was generated.  Service: Hospital Medicine    Final Active  Diagnoses:    Diagnosis Date Noted POA    PRINCIPAL PROBLEM:  Cellulitis and abscess of toe of right foot [L03.031, L02.611] 04/01/2023 Yes    Acute respiratory failure with hypoxia [J96.01] 04/03/2023 No    Abscess of bursa of right foot [M71.071] 04/02/2023 Yes    Chronic multifocal osteomyelitis of right foot [M86.371] 04/02/2023 Yes    Acute bronchitis [J20.9] 04/02/2023 Yes    Cellulitis of right lower extremity [L03.115] 04/02/2023 Yes    Elevated troponin [R77.8] 04/01/2023 Yes    Fever [R50.9] 04/01/2023 Yes    STERLING (acute kidney injury) [N17.9] 04/01/2023 Yes    Orthostatic hypotension [I95.1] 04/01/2023 Yes    Dislocated toe [S93.106A] 04/01/2023 Yes    Type 2 diabetes mellitus with hypoglycemia without coma, without long-term current use of insulin [E11.649] 04/01/2023 Yes    Multiple falls [R29.6] 04/01/2023 Not Applicable      Problems Resolved During this Admission:       Discharged Condition: good    Disposition: Skilled Nursing Facility    Follow Up:   Follow-up Information     WILLIAN Encanracion. Go on 4/18/2023.    Why: Follow up appointment scheduled for Tuesday, April 18th at 2:00 pm.  Contact information:  [address] [place], [place] [address] [address]  [phone]           Orion Donaldson DPM. Go on 4/17/2023.    Specialty: Podiatry  Why: appointment Monday April 17th at 11:00am for surgery follow up  Contact information:  [address] [facility]  [address]  [facility] [place] [address] [phone]             Dr. Rohit Marsh. Go on 4/25/2023.    Why: Cardiology follow up scheduled for Tuesday, April 25th at 11;15 am.  Contact information:  [address] [facility] [place] [address]  [phone]                     Patient Instructions:      Diet diabetic     Notify your health care provider if you experience any of the following:  severe uncontrolled pain     Notify your health care provider if you experience any of the following:  temperature >100.4     Activity as  tolerated       Significant Diagnostic Studies: N/A  Recent Results (from the past 168 hour(s))   POCT glucose    Collection Time: 03/31/23  4:51 PM   Result Value Ref Range    POCT Glucose 189 (H) 70 - 110 mg/dL   HIV 1/2 Ag/Ab (4th Gen)    Collection Time: 03/31/23  5:50 PM   Result Value Ref Range    HIV 1/2 Ag/Ab Non-reactive Non-reactive   Hepatitis C Antibody    Collection Time: 03/31/23  5:50 PM   Result Value Ref Range    Hepatitis C Ab Non-reactive Non-reactive   Complete Blood Count (CBC)    Collection Time: 03/31/23  5:50 PM   Result Value Ref Range    WBC 11.89 3.90 - 12.70 K/uL    RBC 3.58 (L) 4.60 - 6.20 M/uL    Hemoglobin 10.5 (L) 14.0 - 18.0 g/dL    Hematocrit 31.5 (L) 40.0 - 54.0 %    MCV 88 82 - 98 fL    MCH 29.3 27.0 - 31.0 pg    MCHC 33.3 32.0 - 36.0 g/dL    RDW 13.1 11.5 - 14.5 %    Platelets 266 150 - 450 K/uL    MPV 10.7 9.2 - 12.9 fL    Immature Granulocytes 0.3 0.0 - 0.5 %    Gran # (ANC) 9.9 (H) 1.8 - 7.7 K/uL    Immature Grans (Abs) 0.04 0.00 - 0.04 K/uL    Lymph # 0.8 (L) 1.0 - 4.8 K/uL    Mono # 1.1 (H) 0.3 - 1.0 K/uL    Eos # 0.0 0.0 - 0.5 K/uL    Baso # 0.03 0.00 - 0.20 K/uL    nRBC 0 0 /100 WBC    Gran % 83.0 (H) 38.0 - 73.0 %    Lymph % 6.8 (L) 18.0 - 48.0 %    Mono % 9.3 4.0 - 15.0 %    Eosinophil % 0.3 0.0 - 8.0 %    Basophil % 0.3 0.0 - 1.9 %    Differential Method Automated    Comprehensive Metabolic Panel (CMP)    Collection Time: 03/31/23  5:50 PM   Result Value Ref Range    Sodium 133 (L) 136 - 145 mmol/L    Potassium 4.4 3.5 - 5.1 mmol/L    Chloride 101 95 - 110 mmol/L    CO2 24 23 - 29 mmol/L    Glucose 151 (H) 70 - 110 mg/dL    BUN 32 (H) 8 - 23 mg/dL    Creatinine 2.3 (H) 0.5 - 1.4 mg/dL    Calcium 9.2 8.7 - 10.5 mg/dL    Total Protein 7.3 6.0 - 8.4 g/dL    Albumin 2.9 (L) 3.5 - 5.2 g/dL    Total Bilirubin 0.8 0.1 - 1.0 mg/dL    Alkaline Phosphatase 63 55 - 135 U/L    AST 30 10 - 40 U/L    ALT 21 10 - 44 U/L    Anion Gap 8 8 - 16 mmol/L    eGFR 30.9 (A) >60 mL/min/1.73 m^2    Lipase    Collection Time: 03/31/23  5:50 PM   Result Value Ref Range    Lipase 26 4 - 60 U/L   Brain natriuretic peptide    Collection Time: 03/31/23  5:50 PM   Result Value Ref Range    BNP 55 0 - 99 pg/mL   Hemoglobin A1c    Collection Time: 03/31/23  5:50 PM   Result Value Ref Range    Hemoglobin A1C 6.5 (H) 4.0 - 5.6 %    Estimated Avg Glucose 140 (H) 68 - 131 mg/dL   Troponin I    Collection Time: 03/31/23  5:50 PM   Result Value Ref Range    Troponin I 0.119 (H) 0.000 - 0.026 ng/mL   Influenza A & B by Molecular    Collection Time: 03/31/23  7:21 PM    Specimen: Nasopharyngeal Swab   Result Value Ref Range    Influenza A, Molecular Negative Negative    Influenza B, Molecular Negative Negative    Flu A & B Source Nasal Swab    COVID-19 Rapid Screening    Collection Time: 03/31/23  7:21 PM   Result Value Ref Range    SARS-CoV-2 RNA, Amplification, Qual Negative Negative   Blood Culture #2 **CANNOT BE ORDERED STAT**    Collection Time: 03/31/23  7:24 PM    Specimen: Peripheral, Antecubital, Left; Blood   Result Value Ref Range    Blood Culture, Routine No growth after 5 days.    Lactic acid, plasma    Collection Time: 03/31/23  7:25 PM   Result Value Ref Range    Lactate (Lactic Acid) 1.2 0.5 - 2.2 mmol/L   Blood Culture #1 **CANNOT BE ORDERED STAT**    Collection Time: 03/31/23  7:25 PM    Specimen: Peripheral, Antecubital, Right; Blood   Result Value Ref Range    Blood Culture, Routine No growth after 5 days.    CPK    Collection Time: 03/31/23  8:09 PM   Result Value Ref Range     20 - 200 U/L   Troponin I    Collection Time: 03/31/23  8:09 PM   Result Value Ref Range    Troponin I 0.125 (H) 0.000 - 0.026 ng/mL   Urinalysis, Reflex to Urine Culture Urine, Clean Catch    Collection Time: 03/31/23 11:38 PM    Specimen: Urine, Clean Catch   Result Value Ref Range    Specimen UA Urine, Unspecified     Color, UA Yellow Yellow, Straw, Dalila    Appearance, UA Clear Clear    pH, UA 6.0 5.0 - 8.0    Specific  Gravity, UA 1.010 1.005 - 1.030    Protein, UA 1+ (A) Negative    Glucose, UA Negative Negative    Ketones, UA Negative Negative    Bilirubin (UA) Negative Negative    Occult Blood UA Negative Negative    Nitrite, UA Negative Negative    Urobilinogen, UA 1.0 Negative EU/dL    Leukocytes, UA Negative Negative   Urinalysis Microscopic    Collection Time: 03/31/23 11:38 PM   Result Value Ref Range    RBC, UA 0 0 - 4 /hpf    WBC, UA 0 0 - 5 /hpf    Bacteria Occasional None-Occ /hpf    Hyaline Casts, UA 2 (A) 0-1/lpf /lpf    Amorphous, UA Few None-Moderate    Microscopic Comment SEE COMMENT    Troponin I    Collection Time: 04/01/23  2:45 AM   Result Value Ref Range    Troponin I 0.096 (H) 0.000 - 0.026 ng/mL   CBC with Automated Differential    Collection Time: 04/01/23  4:35 AM   Result Value Ref Range    WBC 11.65 3.90 - 12.70 K/uL    RBC 3.01 (L) 4.60 - 6.20 M/uL    Hemoglobin 8.8 (L) 14.0 - 18.0 g/dL    Hematocrit 26.8 (L) 40.0 - 54.0 %    MCV 89 82 - 98 fL    MCH 29.2 27.0 - 31.0 pg    MCHC 32.8 32.0 - 36.0 g/dL    RDW 13.1 11.5 - 14.5 %    Platelets 237 150 - 450 K/uL    MPV 11.1 9.2 - 12.9 fL    Immature Granulocytes 0.5 0.0 - 0.5 %    Gran # (ANC) 9.0 (H) 1.8 - 7.7 K/uL    Immature Grans (Abs) 0.06 (H) 0.00 - 0.04 K/uL    Lymph # 1.1 1.0 - 4.8 K/uL    Mono # 1.3 (H) 0.3 - 1.0 K/uL    Eos # 0.2 0.0 - 0.5 K/uL    Baso # 0.03 0.00 - 0.20 K/uL    nRBC 0 0 /100 WBC    Gran % 77.4 (H) 38.0 - 73.0 %    Lymph % 9.6 (L) 18.0 - 48.0 %    Mono % 10.7 4.0 - 15.0 %    Eosinophil % 1.5 0.0 - 8.0 %    Basophil % 0.3 0.0 - 1.9 %    Differential Method Automated    Basic Metabolic Panel (BMP)    Collection Time: 04/01/23  4:35 AM   Result Value Ref Range    Sodium 140 136 - 145 mmol/L    Potassium 4.0 3.5 - 5.1 mmol/L    Chloride 109 95 - 110 mmol/L    CO2 21 (L) 23 - 29 mmol/L    Glucose 62 (L) 70 - 110 mg/dL    BUN 32 (H) 8 - 23 mg/dL    Creatinine 2.0 (H) 0.5 - 1.4 mg/dL    Calcium 8.1 (L) 8.7 - 10.5 mg/dL    Anion Gap 10 8 -  16 mmol/L    eGFR 36.6 (A) >60 mL/min/1.73 m^2   Magnesium    Collection Time: 04/01/23  4:35 AM   Result Value Ref Range    Magnesium 2.0 1.6 - 2.6 mg/dL   Phosphorus    Collection Time: 04/01/23  4:35 AM   Result Value Ref Range    Phosphorus 3.7 2.7 - 4.5 mg/dL   POCT glucose    Collection Time: 04/01/23  7:30 AM   Result Value Ref Range    POCT Glucose 65 (L) 70 - 110 mg/dL   Troponin I    Collection Time: 04/01/23 10:56 AM   Result Value Ref Range    Troponin I 0.067 (H) 0.000 - 0.026 ng/mL   C-Reactive Protein    Collection Time: 04/01/23 10:56 AM   Result Value Ref Range    .6 (H) 0.0 - 8.2 mg/L   CK    Collection Time: 04/01/23 10:56 AM   Result Value Ref Range     20 - 200 U/L   POCT glucose    Collection Time: 04/01/23 11:14 AM   Result Value Ref Range    POCT Glucose 124 (H) 70 - 110 mg/dL   POCT glucose    Collection Time: 04/01/23  4:23 PM   Result Value Ref Range    POCT Glucose 111 (H) 70 - 110 mg/dL   Troponin I    Collection Time: 04/01/23  4:28 PM   Result Value Ref Range    Troponin I 0.061 (H) 0.000 - 0.026 ng/mL   POCT glucose    Collection Time: 04/01/23  8:28 PM   Result Value Ref Range    POCT Glucose 186 (H) 70 - 110 mg/dL   CBC auto differential    Collection Time: 04/02/23  3:31 AM   Result Value Ref Range    WBC 15.64 (H) 3.90 - 12.70 K/uL    RBC 3.28 (L) 4.60 - 6.20 M/uL    Hemoglobin 9.6 (L) 14.0 - 18.0 g/dL    Hematocrit 28.8 (L) 40.0 - 54.0 %    MCV 88 82 - 98 fL    MCH 29.3 27.0 - 31.0 pg    MCHC 33.3 32.0 - 36.0 g/dL    RDW 13.1 11.5 - 14.5 %    Platelets 300 150 - 450 K/uL    MPV 11.0 9.2 - 12.9 fL    Immature Granulocytes 0.3 0.0 - 0.5 %    Gran # (ANC) 13.7 (H) 1.8 - 7.7 K/uL    Immature Grans (Abs) 0.05 (H) 0.00 - 0.04 K/uL    Lymph # 0.8 (L) 1.0 - 4.8 K/uL    Mono # 1.0 0.3 - 1.0 K/uL    Eos # 0.1 0.0 - 0.5 K/uL    Baso # 0.05 0.00 - 0.20 K/uL    nRBC 0 0 /100 WBC    Gran % 87.5 (H) 38.0 - 73.0 %    Lymph % 5.4 (L) 18.0 - 48.0 %    Mono % 6.1 4.0 - 15.0 %     Eosinophil % 0.4 0.0 - 8.0 %    Basophil % 0.3 0.0 - 1.9 %    Differential Method Automated    Comprehensive metabolic panel    Collection Time: 04/02/23  3:31 AM   Result Value Ref Range    Sodium 140 136 - 145 mmol/L    Potassium 4.4 3.5 - 5.1 mmol/L    Chloride 110 95 - 110 mmol/L    CO2 18 (L) 23 - 29 mmol/L    Glucose 128 (H) 70 - 110 mg/dL    BUN 21 8 - 23 mg/dL    Creatinine 1.7 (H) 0.5 - 1.4 mg/dL    Calcium 8.5 (L) 8.7 - 10.5 mg/dL    Total Protein 6.4 6.0 - 8.4 g/dL    Albumin 2.4 (L) 3.5 - 5.2 g/dL    Total Bilirubin 0.7 0.1 - 1.0 mg/dL    Alkaline Phosphatase 58 55 - 135 U/L    AST 24 10 - 40 U/L    ALT 18 10 - 44 U/L    Anion Gap 12 8 - 16 mmol/L    eGFR 44.5 (A) >60 mL/min/1.73 m^2   Troponin I    Collection Time: 04/02/23  3:31 AM   Result Value Ref Range    Troponin I 0.348 (H) 0.000 - 0.026 ng/mL   Sedimentation rate    Collection Time: 04/02/23  3:31 AM   Result Value Ref Range    Sed Rate >90 (H) 0 - 10 mm/Hr   POCT glucose    Collection Time: 04/02/23  7:23 AM   Result Value Ref Range    POCT Glucose 140 (H) 70 - 110 mg/dL   Troponin I    Collection Time: 04/02/23  8:26 AM   Result Value Ref Range    Troponin I 0.580 (H) 0.000 - 0.026 ng/mL   POCT glucose    Collection Time: 04/02/23 10:35 AM   Result Value Ref Range    POCT Glucose 131 (H) 70 - 110 mg/dL   Culture, Anaerobe    Collection Time: 04/02/23 11:07 AM    Specimen: Foot, Right; Incision site   Result Value Ref Range    Anaerobic Culture Culture in progress    Aerobic culture    Collection Time: 04/02/23 11:07 AM    Specimen: Foot, Right; Incision site   Result Value Ref Range    Aerobic Bacterial Culture STAPHYLOCOCCUS AUREUS  Many   (A)        Susceptibility    Staphylococcus aureus - CULTURE, AEROBIC  (SPECIFY SOURCE)     Clindamycin <=0.5 Sensitive mcg/mL     Ciprofloxacin <=1 Sensitive mcg/mL     Daptomycin <=0.5 Sensitive mcg/mL     Erythromycin <=0.5 Sensitive mcg/mL     Levofloxacin <=1 Sensitive mcg/mL     Linezolid 4 Sensitive  mcg/mL     Moxifloxacin <=0.5 Sensitive mcg/mL     Oxacillin <=0.25 Sensitive mcg/mL     Penicillin <=0.03 Sensitive mcg/mL     Trimeth/Sulfa <=0.5/9.5 Sensitive mcg/mL     Tetracycline <=4 Sensitive mcg/mL   POCT glucose    Collection Time: 04/02/23 12:15 PM   Result Value Ref Range    POCT Glucose 127 (H) 70 - 110 mg/dL   POCT glucose    Collection Time: 04/02/23  4:15 PM   Result Value Ref Range    POCT Glucose 159 (H) 70 - 110 mg/dL   POCT glucose    Collection Time: 04/02/23  8:31 PM   Result Value Ref Range    POCT Glucose 252 (H) 70 - 110 mg/dL   CBC auto differential    Collection Time: 04/03/23  4:53 AM   Result Value Ref Range    WBC 14.51 (H) 3.90 - 12.70 K/uL    RBC 2.83 (L) 4.60 - 6.20 M/uL    Hemoglobin 8.1 (L) 14.0 - 18.0 g/dL    Hematocrit 24.9 (L) 40.0 - 54.0 %    MCV 88 82 - 98 fL    MCH 28.6 27.0 - 31.0 pg    MCHC 32.5 32.0 - 36.0 g/dL    RDW 13.2 11.5 - 14.5 %    Platelets 318 150 - 450 K/uL    MPV 11.1 9.2 - 12.9 fL    Immature Granulocytes 0.6 (H) 0.0 - 0.5 %    Gran # (ANC) 12.1 (H) 1.8 - 7.7 K/uL    Immature Grans (Abs) 0.08 (H) 0.00 - 0.04 K/uL    Lymph # 1.0 1.0 - 4.8 K/uL    Mono # 1.2 (H) 0.3 - 1.0 K/uL    Eos # 0.0 0.0 - 0.5 K/uL    Baso # 0.06 0.00 - 0.20 K/uL    nRBC 0 0 /100 WBC    Gran % 83.2 (H) 38.0 - 73.0 %    Lymph % 7.1 (L) 18.0 - 48.0 %    Mono % 8.4 4.0 - 15.0 %    Eosinophil % 0.3 0.0 - 8.0 %    Basophil % 0.4 0.0 - 1.9 %    Differential Method Automated    Comprehensive metabolic panel    Collection Time: 04/03/23  4:53 AM   Result Value Ref Range    Sodium 138 136 - 145 mmol/L    Potassium 4.6 3.5 - 5.1 mmol/L    Chloride 109 95 - 110 mmol/L    CO2 21 (L) 23 - 29 mmol/L    Glucose 155 (H) 70 - 110 mg/dL    BUN 22 8 - 23 mg/dL    Creatinine 1.6 (H) 0.5 - 1.4 mg/dL    Calcium 8.4 (L) 8.7 - 10.5 mg/dL    Total Protein 6.0 6.0 - 8.4 g/dL    Albumin 2.1 (L) 3.5 - 5.2 g/dL    Total Bilirubin 0.5 0.1 - 1.0 mg/dL    Alkaline Phosphatase 55 55 - 135 U/L    AST 18 10 - 40 U/L    ALT  15 10 - 44 U/L    Anion Gap 8 8 - 16 mmol/L    eGFR 47.8 (A) >60 mL/min/1.73 m^2   CBC Auto Differential    Collection Time: 04/03/23  4:53 AM   Result Value Ref Range    WBC 14.51 (H) 3.90 - 12.70 K/uL    RBC 2.83 (L) 4.60 - 6.20 M/uL    Hemoglobin 8.1 (L) 14.0 - 18.0 g/dL    Hematocrit 24.9 (L) 40.0 - 54.0 %    MCV 88 82 - 98 fL    MCH 28.6 27.0 - 31.0 pg    MCHC 32.5 32.0 - 36.0 g/dL    RDW 13.2 11.5 - 14.5 %    Platelets 318 150 - 450 K/uL    MPV 11.1 9.2 - 12.9 fL    Immature Granulocytes 0.6 (H) 0.0 - 0.5 %    Gran # (ANC) 12.1 (H) 1.8 - 7.7 K/uL    Immature Grans (Abs) 0.08 (H) 0.00 - 0.04 K/uL    Lymph # 1.0 1.0 - 4.8 K/uL    Mono # 1.2 (H) 0.3 - 1.0 K/uL    Eos # 0.0 0.0 - 0.5 K/uL    Baso # 0.06 0.00 - 0.20 K/uL    nRBC 0 0 /100 WBC    Gran % 83.2 (H) 38.0 - 73.0 %    Lymph % 7.1 (L) 18.0 - 48.0 %    Mono % 8.4 4.0 - 15.0 %    Eosinophil % 0.3 0.0 - 8.0 %    Basophil % 0.4 0.0 - 1.9 %    Differential Method Automated    Comprehensive Metabolic Panel    Collection Time: 04/03/23  4:53 AM   Result Value Ref Range    Sodium 138 136 - 145 mmol/L    Potassium 4.6 3.5 - 5.1 mmol/L    Chloride 109 95 - 110 mmol/L    CO2 21 (L) 23 - 29 mmol/L    Glucose 155 (H) 70 - 110 mg/dL    BUN 22 8 - 23 mg/dL    Creatinine 1.6 (H) 0.5 - 1.4 mg/dL    Calcium 8.4 (L) 8.7 - 10.5 mg/dL    Total Protein 6.0 6.0 - 8.4 g/dL    Albumin 2.1 (L) 3.5 - 5.2 g/dL    Total Bilirubin 0.5 0.1 - 1.0 mg/dL    Alkaline Phosphatase 55 55 - 135 U/L    AST 18 10 - 40 U/L    ALT 15 10 - 44 U/L    Anion Gap 8 8 - 16 mmol/L    eGFR 47.8 (A) >60 mL/min/1.73 m^2   Magnesium    Collection Time: 04/03/23  4:53 AM   Result Value Ref Range    Magnesium 1.8 1.6 - 2.6 mg/dL   BNP    Collection Time: 04/03/23  4:53 AM   Result Value Ref Range     (H) 0 - 99 pg/mL   Troponin I    Collection Time: 04/03/23  4:53 AM   Result Value Ref Range    Troponin I 0.924 (H) 0.000 - 0.026 ng/mL   POCT glucose    Collection Time: 04/03/23  7:03 AM   Result Value Ref  Range    POCT Glucose 143 (H) 70 - 110 mg/dL   Echo    Collection Time: 04/03/23 10:39 AM   Result Value Ref Range    BSA 1.82 m2    TDI SEPTAL 0.08 m/s    LV LATERAL E/E' RATIO 13.90 m/s    LV SEPTAL E/E' RATIO 17.38 m/s    IVC diameter 1.86 cm    RV mid diameter 1.81 cm    Left Ventricular Outflow Tract Mean Velocity 0.94 cm/s    Left Ventricular Outflow Tract Mean Gradient 4.02 mmHg    Pulmonary Valve Mean Velocity 0.86 m/s    AORTIC VALVE CUSP SEPERATION 1.72 cm    TDI LATERAL 0.10 m/s    PV PEAK VELOCITY 1.07 cm/s    LVIDd 4.01 3.5 - 6.0 cm    IVS 1.27 (A) 0.6 - 1.1 cm    Posterior Wall 1.30 (A) 0.6 - 1.1 cm    Ao root annulus 3.41 cm    LVIDs 2.67 2.1 - 4.0 cm    FS 33 28 - 44 %    STJ 2.80 cm    Ascending aorta 2.75 cm    LV mass 183.98 g    LA size 4.17 cm    RVDD 2.89 cm    Right ventricular length in diastole (apical 4-chamber view) 5.65 cm    Tricuspid valve peak A wave velocity [id number] m/s    TV peak E junior 0.6 m/s    Left Ventricle Relative Wall Thickness 0.65 cm    AV regurgitation pressure 1/2 time [id number] ms    AV mean gradient 4 mmHg    AV valve area 2.94 cm2    AV Velocity Ratio 1.03     AV index (prosthetic) 1.05     MV mean gradient 4 mmHg    MV valve area p 1/2 method 3.40 cm2    MV valve area by continuity eq 1.76 cm2    E/A ratio 1.06     Mean e' 0.09 m/s    E wave deceleration time 205.10 msec    IVRT 79.92 msec    LVOT diameter 1.89 cm    LVOT area 2.8 cm2    LVOT peak junior 1.33 m/s    LVOT peak VTI 26.90 cm    Ao peak junior 1.29 m/s    Ao VTI 25.7 cm    Mr max junior 4.19 m/s    LVOT stroke volume 75.43 cm3    AV peak gradient 7 mmHg    MV peak gradient 8 mmHg    PV mean gradient 3.00 mmHg    E/E' ratio 15.44 m/s    MV Peak E Junior 1.39 m/s    AR Max Junior 3.92 m/s    TR Max Junior 2.29 m/s    MV VTI 42.9 cm    MV stenosis pressure 1/2 time 64.76 ms    MV Peak A Junior 1.31 m/s    LV Systolic Volume 26.27 mL    LV Systolic Volume Index 14.4 mL/m2    LV Diastolic Volume 70.47 mL    LV  Diastolic Volume Index 38.72 mL/m2    LV Mass Index 101 g/m2    RA Major Axis 3.62 cm    Left Atrium Minor Axis 3.21 cm    Left Atrium Major Axis 4.15 cm    Triscuspid Valve Regurgitation Peak Gradient 21 mmHg    RA Width 2.68 cm    EF 55 %   POCT glucose    Collection Time: 04/03/23 11:24 AM   Result Value Ref Range    POCT Glucose 164 (H) 70 - 110 mg/dL   POCT glucose    Collection Time: 04/03/23  4:10 PM   Result Value Ref Range    POCT Glucose 240 (H) 70 - 110 mg/dL   VANCOMYCIN, TROUGH    Collection Time: 04/03/23  4:37 PM   Result Value Ref Range    Vancomycin-Trough 8.7 (L) 10.0 - 22.0 ug/mL   Basic metabolic panel    Collection Time: 04/03/23  4:37 PM   Result Value Ref Range    Sodium 136 136 - 145 mmol/L    Potassium 4.2 3.5 - 5.1 mmol/L    Chloride 104 95 - 110 mmol/L    CO2 21 (L) 23 - 29 mmol/L    Glucose 225 (H) 70 - 110 mg/dL    BUN 24 (H) 8 - 23 mg/dL    Creatinine 1.8 (H) 0.5 - 1.4 mg/dL    Calcium 8.4 (L) 8.7 - 10.5 mg/dL    Anion Gap 11 8 - 16 mmol/L    eGFR 41.5 (A) >60 mL/min/1.73 m^2   Magnesium    Collection Time: 04/03/23  4:37 PM   Result Value Ref Range    Magnesium 1.8 1.6 - 2.6 mg/dL   POCT glucose    Collection Time: 04/03/23  4:40 PM   Result Value Ref Range    POCT Glucose 250 (H) 70 - 110 mg/dL   POCT glucose    Collection Time: 04/03/23  8:30 PM   Result Value Ref Range    POCT Glucose 253 (H) 70 - 110 mg/dL   CBC Auto Differential    Collection Time: 04/04/23  5:09 AM   Result Value Ref Range    WBC 12.01 3.90 - 12.70 K/uL    RBC 3.01 (L) 4.60 - 6.20 M/uL    Hemoglobin 8.7 (L) 14.0 - 18.0 g/dL    Hematocrit 26.0 (L) 40.0 - 54.0 %    MCV 86 82 - 98 fL    MCH 28.9 27.0 - 31.0 pg    MCHC 33.5 32.0 - 36.0 g/dL    RDW 13.2 11.5 - 14.5 %    Platelets 354 150 - 450 K/uL    MPV 10.9 9.2 - 12.9 fL    Immature Granulocytes 0.7 (H) 0.0 - 0.5 %    Gran # (ANC) 9.7 (H) 1.8 - 7.7 K/uL    Immature Grans (Abs) 0.09 (H) 0.00 - 0.04 K/uL    Lymph # 0.9 (L) 1.0 - 4.8 K/uL    Mono # 1.1 (H) 0.3 - 1.0  K/uL    Eos # 0.1 0.0 - 0.5 K/uL    Baso # 0.06 0.00 - 0.20 K/uL    nRBC 0 0 /100 WBC    Gran % 81.1 (H) 38.0 - 73.0 %    Lymph % 7.8 (L) 18.0 - 48.0 %    Mono % 8.7 4.0 - 15.0 %    Eosinophil % 1.2 0.0 - 8.0 %    Basophil % 0.5 0.0 - 1.9 %    Differential Method Automated    Basic Metabolic Panel    Collection Time: 04/04/23  5:09 AM   Result Value Ref Range    Sodium 137 136 - 145 mmol/L    Potassium 4.3 3.5 - 5.1 mmol/L    Chloride 105 95 - 110 mmol/L    CO2 21 (L) 23 - 29 mmol/L    Glucose 154 (H) 70 - 110 mg/dL    BUN 21 8 - 23 mg/dL    Creatinine 1.6 (H) 0.5 - 1.4 mg/dL    Calcium 8.4 (L) 8.7 - 10.5 mg/dL    Anion Gap 11 8 - 16 mmol/L    eGFR 47.8 (A) >60 mL/min/1.73 m^2   Magnesium    Collection Time: 04/04/23  5:09 AM   Result Value Ref Range    Magnesium 1.9 1.6 - 2.6 mg/dL   POCT glucose    Collection Time: 04/04/23  7:45 AM   Result Value Ref Range    POCT Glucose 156 (H) 70 - 110 mg/dL   POCT glucose    Collection Time: 04/04/23 11:33 AM   Result Value Ref Range    POCT Glucose 234 (H) 70 - 110 mg/dL   POCT glucose    Collection Time: 04/04/23  4:47 PM   Result Value Ref Range    POCT Glucose 260 (H) 70 - 110 mg/dL   POCT glucose    Collection Time: 04/04/23  9:18 PM   Result Value Ref Range    POCT Glucose 363 (H) 70 - 110 mg/dL   Basic Metabolic Panel    Collection Time: 04/05/23  4:15 AM   Result Value Ref Range    Sodium 136 136 - 145 mmol/L    Potassium 3.6 3.5 - 5.1 mmol/L    Chloride 103 95 - 110 mmol/L    CO2 25 23 - 29 mmol/L    Glucose 237 (H) 70 - 110 mg/dL    BUN 22 8 - 23 mg/dL    Creatinine 1.7 (H) 0.5 - 1.4 mg/dL    Calcium 8.3 (L) 8.7 - 10.5 mg/dL    Anion Gap 8 8 - 16 mmol/L    eGFR 44.5 (A) >60 mL/min/1.73 m^2   CBC Auto Differential    Collection Time: 04/05/23  4:15 AM   Result Value Ref Range    WBC 10.43 3.90 - 12.70 K/uL    RBC 3.04 (L) 4.60 - 6.20 M/uL    Hemoglobin 8.7 (L) 14.0 - 18.0 g/dL    Hematocrit 26.1 (L) 40.0 - 54.0 %    MCV 86 82 - 98 fL    MCH 28.6 27.0 - 31.0 pg     MCHC 33.3 32.0 - 36.0 g/dL    RDW 13.2 11.5 - 14.5 %    Platelets 366 150 - 450 K/uL    MPV 10.8 9.2 - 12.9 fL    Immature Granulocytes 0.7 (H) 0.0 - 0.5 %    Gran # (ANC) 8.2 (H) 1.8 - 7.7 K/uL    Immature Grans (Abs) 0.07 (H) 0.00 - 0.04 K/uL    Lymph # 0.9 (L) 1.0 - 4.8 K/uL    Mono # 1.0 0.3 - 1.0 K/uL    Eos # 0.2 0.0 - 0.5 K/uL    Baso # 0.05 0.00 - 0.20 K/uL    nRBC 0 0 /100 WBC    Gran % 78.1 (H) 38.0 - 73.0 %    Lymph % 9.0 (L) 18.0 - 48.0 %    Mono % 9.9 4.0 - 15.0 %    Eosinophil % 1.8 0.0 - 8.0 %    Basophil % 0.5 0.0 - 1.9 %    Differential Method Automated    Magnesium    Collection Time: 04/05/23  4:15 AM   Result Value Ref Range    Magnesium 2.0 1.6 - 2.6 mg/dL   POCT glucose    Collection Time: 04/05/23  7:53 AM   Result Value Ref Range    POCT Glucose 212 (H) 70 - 110 mg/dL   POCT glucose    Collection Time: 04/05/23 10:32 AM   Result Value Ref Range    POCT Glucose 284 (H) 70 - 110 mg/dL   POCT glucose    Collection Time: 04/05/23  4:08 PM   Result Value Ref Range    POCT Glucose 257 (H) 70 - 110 mg/dL   POCT glucose    Collection Time: 04/05/23  9:09 PM   Result Value Ref Range    POCT Glucose 268 (H) 70 - 110 mg/dL   CBC Auto Differential    Collection Time: 04/06/23  4:49 AM   Result Value Ref Range    WBC 10.36 3.90 - 12.70 K/uL    RBC 3.05 (L) 4.60 - 6.20 M/uL    Hemoglobin 8.7 (L) 14.0 - 18.0 g/dL    Hematocrit 26.2 (L) 40.0 - 54.0 %    MCV 86 82 - 98 fL    MCH 28.5 27.0 - 31.0 pg    MCHC 33.2 32.0 - 36.0 g/dL    RDW 13.2 11.5 - 14.5 %    Platelets 414 150 - 450 K/uL    MPV 10.8 9.2 - 12.9 fL    Immature Granulocytes 0.7 (H) 0.0 - 0.5 %    Gran # (ANC) 7.9 (H) 1.8 - 7.7 K/uL    Immature Grans (Abs) 0.07 (H) 0.00 - 0.04 K/uL    Lymph # 1.0 1.0 - 4.8 K/uL    Mono # 1.1 (H) 0.3 - 1.0 K/uL    Eos # 0.2 0.0 - 0.5 K/uL    Baso # 0.04 0.00 - 0.20 K/uL    nRBC 0 0 /100 WBC    Gran % 76.4 (H) 38.0 - 73.0 %    Lymph % 9.9 (L) 18.0 - 48.0 %    Mono % 10.4 4.0 - 15.0 %    Eosinophil % 2.2 0.0 - 8.0 %     Basophil % 0.4 0.0 - 1.9 %    Differential Method Automated    Basic Metabolic Panel    Collection Time: 04/06/23  4:49 AM   Result Value Ref Range    Sodium 138 136 - 145 mmol/L    Potassium 3.9 3.5 - 5.1 mmol/L    Chloride 103 95 - 110 mmol/L    CO2 23 23 - 29 mmol/L    Glucose 194 (H) 70 - 110 mg/dL    BUN 21 8 - 23 mg/dL    Creatinine 1.5 (H) 0.5 - 1.4 mg/dL    Calcium 8.3 (L) 8.7 - 10.5 mg/dL    Anion Gap 12 8 - 16 mmol/L    eGFR 51.7 (A) >60 mL/min/1.73 m^2   BNP    Collection Time: 04/06/23  4:49 AM   Result Value Ref Range     (H) 0 - 99 pg/mL   POCT glucose    Collection Time: 04/06/23  7:10 AM   Result Value Ref Range    POCT Glucose 194 (H) 70 - 110 mg/dL   POCT glucose    Collection Time: 04/06/23 11:43 AM   Result Value Ref Range    POCT Glucose 229 (H) 70 - 110 mg/dL   POCT glucose    Collection Time: 04/06/23  4:09 PM   Result Value Ref Range    POCT Glucose 257 (H) 70 - 110 mg/dL   POCT glucose    Collection Time: 04/06/23  9:12 PM   Result Value Ref Range    POCT Glucose 222 (H) 70 - 110 mg/dL   POCT glucose    Collection Time: 04/07/23  4:04 AM   Result Value Ref Range    POCT Glucose 179 (H) 70 - 110 mg/dL         Pending Diagnostic Studies:     Procedure Component Value Units Date/Time    EKG 12-lead [[id number]] Collected: 04/02/23 0806    Order Status: Sent Lab Status: In process Updated: 04/03/23 0731    Narrative:      Test Reason : R77.8,    Vent. Rate : 100 BPM     Atrial Rate : 100 BPM     P-R Int : 146 ms          QRS Dur : 120 ms      QT Int : 350 ms       P-R-T Axes : 009 086 -20 degrees     QTc Int : 451 ms    Normal sinus rhythm  Low voltage QRS  Right bundle branch block  T wave abnormality, consider inferior ischemia  Abnormal ECG  When compared with ECG of 02-APR-2023 04:45,  Criteria for Septal infarct are no longer Present    Referred By: [email or web address]   SELF           Confirmed By:     Specimen to Pathology, Surgery Other (Podiatry) [[id number]] Collected: 04/02/23 1122     Order Status: Sent Lab Status: In process Updated: 04/03/23 0715    Specimen: Tissue          Medications:  Reconciled Home Medications:      Medication List      START taking these medications    furosemide 20 MG tablet  Commonly known as: LASIX  Take 1 tablet (20 mg total) by mouth once daily.     lisinopriL 20 MG tablet  Commonly known as: PRINIVIL,ZESTRIL  Take 1 tablet (20 mg total) by mouth once daily.  Start taking on: April 8, 2023     moxifloxacin 400 mg tablet  Commonly known as: AVELOX  Take 1 tablet (400 mg total) by mouth once daily.     oxyCODONE 5 MG immediate release tablet  Commonly known as: ROXICODONE  Take 1 tablet (5 mg total) by mouth every 6 (six) hours as needed for Pain.        CHANGE how you take these medications    gabapentin 600 MG tablet  Commonly known as: NEURONTIN  Take 0.5 tablets (300 mg total) by mouth 3 (three) times daily.  What changed:   · medication strength  · how much to take     metFORMIN 1000 MG tablet  Commonly known as: GLUCOPHAGE  Take 0.5 tablets (500 mg total) by mouth 2 (two) times daily with meals.  What changed: how much to take        CONTINUE taking these medications    glipiZIDE 10 MG tablet  Commonly known as: GLUCOTROL  Take 10 mg by mouth 2 (two) times daily before meals.        STOP taking these medications    INV LISINOPRIL-HCTZ 20-25     promethazine 25 MG tablet  Commonly known as: PHENERGAN            Indwelling Lines/Drains at time of discharge:   Lines/Drains/Airways     None                 Time spent on the discharge of patient: 45 minutes         Devin Baker MD  Department of Hospital Medicine  [facility]

## 2023-04-10 NOTE — PLAN OF CARE
[facility] - Intensive Care  Discharge Final Note    Primary Care Provider: EDNA Butler    Expected Discharge Date: 4/7/2023  Patient was discharge to skilled nursing facility over weekend. Follow up appointments were scheudled for PCP, Dr. Kori Clark; cardiology, Dr. Marsh; and podiatry, Dr. Donaldson. Information was listed on AVS.   Final Discharge Note (most recent)       Final Note - 04/10/23 1133          Final Note    Assessment Type Final Discharge Note     Anticipated Discharge Disposition Skilled Nursing Facility     What phone number can be called within the next 1-3 days to see how you are doing after discharge? [phone]     Hospital Resources/Appts/Education Provided Appointments scheduled and added to AVS        Post-Acute Status    Post-Acute Placement Status Set-up Complete/Auth obtained     Discharge Delays None known at this time                     Important Message from Medicare             Contact Info       WILLIAN Encarnacion    [address] [place], [place] [address] [address]  [phone]       Next Steps: Go on 4/18/2023    Instructions: Follow up appointment scheduled for Tuesday, April 18th at 2:00 pm.    Orion Donaldson DPM   Specialty: Podiatry    [address] [facility] [place] [place] [address]   Phone: [phone]       Next Steps: Go on 4/17/2023    Instructions: appointment Monday April 17th at 11:00am for surgery follow up    Dr. Rohit Marsh    [address] [facility], [place] [id number] [phone]       Next Steps: Go on 4/25/2023    Instructions: Cardiology follow up scheduled for Tuesday, April 25th at 11;15 am.

## 2023-04-13 LAB
FINAL PATHOLOGIC DIAGNOSIS: NORMAL
GROSS: NORMAL
Lab: NORMAL
MICROSCOPIC EXAM: NORMAL

## 2023-04-17 ENCOUNTER — OFFICE VISIT (OUTPATIENT)
Dept: PODIATRY | Facility: CLINIC | Age: 63
End: 2023-04-17
Payer: MEDICAID

## 2023-04-17 VITALS
DIASTOLIC BLOOD PRESSURE: 68 MMHG | WEIGHT: 152 LBS | BODY MASS INDEX: 23.11 KG/M2 | HEART RATE: 99 BPM | SYSTOLIC BLOOD PRESSURE: 146 MMHG

## 2023-04-17 DIAGNOSIS — E11.9 COMPREHENSIVE DIABETIC FOOT EXAMINATION, TYPE 2 DM, ENCOUNTER FOR: ICD-10-CM

## 2023-04-17 DIAGNOSIS — E11.649 TYPE 2 DIABETES MELLITUS WITH HYPOGLYCEMIA WITHOUT COMA, WITHOUT LONG-TERM CURRENT USE OF INSULIN: ICD-10-CM

## 2023-04-17 DIAGNOSIS — S98.919A AMPUTATION OF FOOT: Primary | ICD-10-CM

## 2023-04-17 PROCEDURE — 3077F PR MOST RECENT SYSTOLIC BLOOD PRESSURE >= 140 MM HG: ICD-10-PCS | Mod: CPTII,,, | Performed by: PODIATRIST

## 2023-04-17 PROCEDURE — 3044F HG A1C LEVEL LT 7.0%: CPT | Mod: CPTII,,, | Performed by: PODIATRIST

## 2023-04-17 PROCEDURE — 4010F ACE/ARB THERAPY RXD/TAKEN: CPT | Mod: CPTII,,, | Performed by: PODIATRIST

## 2023-04-17 PROCEDURE — 3008F PR BODY MASS INDEX (BMI) DOCUMENTED: ICD-10-PCS | Mod: CPTII,,, | Performed by: PODIATRIST

## 2023-04-17 PROCEDURE — 3077F SYST BP >= 140 MM HG: CPT | Mod: CPTII,,, | Performed by: PODIATRIST

## 2023-04-17 PROCEDURE — 3078F DIAST BP <80 MM HG: CPT | Mod: CPTII,,, | Performed by: PODIATRIST

## 2023-04-17 PROCEDURE — 3008F BODY MASS INDEX DOCD: CPT | Mod: CPTII,,, | Performed by: PODIATRIST

## 2023-04-17 PROCEDURE — 99024 POSTOP FOLLOW-UP VISIT: CPT | Mod: ,,, | Performed by: PODIATRIST

## 2023-04-17 PROCEDURE — 1159F MED LIST DOCD IN RCRD: CPT | Mod: CPTII,,, | Performed by: PODIATRIST

## 2023-04-17 PROCEDURE — 99024 PR POST-OP FOLLOW-UP VISIT: ICD-10-PCS | Mod: ,,, | Performed by: PODIATRIST

## 2023-04-17 PROCEDURE — 3044F PR MOST RECENT HEMOGLOBIN A1C LEVEL <7.0%: ICD-10-PCS | Mod: CPTII,,, | Performed by: PODIATRIST

## 2023-04-17 PROCEDURE — 1159F PR MEDICATION LIST DOCUMENTED IN MEDICAL RECORD: ICD-10-PCS | Mod: CPTII,,, | Performed by: PODIATRIST

## 2023-04-17 PROCEDURE — 3078F PR MOST RECENT DIASTOLIC BLOOD PRESSURE < 80 MM HG: ICD-10-PCS | Mod: CPTII,,, | Performed by: PODIATRIST

## 2023-04-17 PROCEDURE — 1160F RVW MEDS BY RX/DR IN RCRD: CPT | Mod: CPTII,,, | Performed by: PODIATRIST

## 2023-04-17 PROCEDURE — 1160F PR REVIEW ALL MEDS BY PRESCRIBER/CLIN PHARMACIST DOCUMENTED: ICD-10-PCS | Mod: CPTII,,, | Performed by: PODIATRIST

## 2023-04-17 PROCEDURE — 99999 PR PBB SHADOW E&M-EST. PATIENT-LVL III: ICD-10-PCS | Mod: PBBFAC,,, | Performed by: PODIATRIST

## 2023-04-17 PROCEDURE — 99213 OFFICE O/P EST LOW 20 MIN: CPT | Mod: PBBFAC | Performed by: PODIATRIST

## 2023-04-17 PROCEDURE — 4010F PR ACE/ARB THEARPY RXD/TAKEN: ICD-10-PCS | Mod: CPTII,,, | Performed by: PODIATRIST

## 2023-04-17 PROCEDURE — 99999 PR PBB SHADOW E&M-EST. PATIENT-LVL III: CPT | Mod: PBBFAC,,, | Performed by: PODIATRIST

## 2023-04-17 RX ORDER — INSULIN DETEMIR 100 [IU]/ML
INJECTION, SOLUTION SUBCUTANEOUS
COMMUNITY
Start: 2022-06-03

## 2023-04-17 RX ORDER — MOXIFLOXACIN HYDROCHLORIDE 400 MG/1
400 TABLET ORAL DAILY
Qty: 14 TABLET | Refills: 0 | Status: SHIPPED | OUTPATIENT
Start: 2023-04-17 | End: 2023-04-19

## 2023-04-18 PROBLEM — E11.9 COMPREHENSIVE DIABETIC FOOT EXAMINATION, TYPE 2 DM, ENCOUNTER FOR: Status: ACTIVE | Noted: 2023-04-18

## 2023-04-18 PROBLEM — S98.919A AMPUTATION OF FOOT: Status: ACTIVE | Noted: 2023-04-18

## 2023-04-18 NOTE — PROGRESS NOTES
Juan Pablo Chang is a 62 y.o. male patient.   1. Amputation of foot    2. Type 2 diabetes mellitus with hypoglycemia without coma, without long-term current use of insulin    3. Comprehensive diabetic foot examination, type 2 DM, encounter for      Past Medical History:   Diagnosis Date    Diabetes mellitus     Diabetes mellitus, type 2     Hypertension     Neuropathy      No past surgical history pertinent negatives on file.  Scheduled Meds:  Continuous Infusions:  PRN Meds:    Review of patient's allergies indicates:  No Known Allergies  There are no hospital problems to display for this patient.    Blood pressure (!) 146/68, pulse 99, weight 68.9 kg (152 lb).                              Subjective  Objective  Assessment & Plan  Presents today for his initial outpatient postoperative evaluation following a transmetatarsal amputation right foot.  Patient states that he was only in rehab for about 2 days he states he was not happy with the care he was given and the limited physical therapy so he left he did not get the prescription for Avelox filled that was given to him at the time of discharge from the hospital so he is not been on any antibiotics since surgery.  Patient clearly has got the dressing wet he has been walking on the foot using a roll later walker but he is not been maintaining a complete nonweightbearing status.  On evaluation the patient's dressing is very soiled it is moist upon removal there is significant maceration along the lateral portion of the incision patient advised he is got to keep the area clean dry and maintain a nonweightbearing status.  Betadine was applied to the incision area was allowed to dry completely a new well-padded thick protective dressing was applied patient was dispensed a light weight walking boot to keep the area protected I did give him a new prescription for Avelox which she needs to start taking immediately this was sent to the pharmacy as a precaution  postoperatively the incision looks good other than the maceration there is no active drainage no active signs of infection noted.  I do plan to follow up with the patient in 1 week he has been advised to contact us with any problems questions or concerns he understands the need to keep the area dry and clean.This note was created using [employer] voice recognition software that occasionally misinterpreted phrases or words.   Orion Donaldson DPM  4/18/2023

## 2023-04-19 ENCOUNTER — TELEPHONE (OUTPATIENT)
Dept: PODIATRY | Facility: CLINIC | Age: 63
End: 2023-04-19
Payer: MEDICAID

## 2023-04-19 RX ORDER — CLINDAMYCIN HYDROCHLORIDE 300 MG/1
300 CAPSULE ORAL 3 TIMES DAILY
Qty: 42 CAPSULE | Refills: 0 | Status: ON HOLD | OUTPATIENT
Start: 2023-04-19 | End: 2023-04-24 | Stop reason: HOSPADM

## 2023-04-19 NOTE — TELEPHONE ENCOUNTER
Spoke with patient's brother. Brother says that medicaid rejected antibiotic and it's $200. Brother requesting something else be called in to [facility].

## 2023-04-21 ENCOUNTER — HOSPITAL ENCOUNTER (INPATIENT)
Facility: HOSPITAL | Age: 63
LOS: 3 days | Discharge: HOME HEALTH | End: 2023-04-24
Attending: EMERGENCY MEDICINE | Admitting: STUDENT IN AN ORGANIZED HEALTH CARE EDUCATION/TRAINING PROGRAM
Payer: MEDICAID

## 2023-04-21 DIAGNOSIS — R07.9 CHEST PAIN: ICD-10-CM

## 2023-04-21 DIAGNOSIS — M86.371 CHRONIC MULTIFOCAL OSTEOMYELITIS OF RIGHT FOOT: Primary | ICD-10-CM

## 2023-04-21 DIAGNOSIS — L03.031 CELLULITIS AND ABSCESS OF TOE OF RIGHT FOOT: ICD-10-CM

## 2023-04-21 DIAGNOSIS — L02.611 CELLULITIS AND ABSCESS OF TOE OF RIGHT FOOT: ICD-10-CM

## 2023-04-21 DIAGNOSIS — R65.20 SEVERE SEPSIS: ICD-10-CM

## 2023-04-21 DIAGNOSIS — A41.9 SEVERE SEPSIS: ICD-10-CM

## 2023-04-21 DIAGNOSIS — R50.9 FEVER: ICD-10-CM

## 2023-04-21 DIAGNOSIS — E87.5 HYPERKALEMIA: ICD-10-CM

## 2023-04-21 PROBLEM — I50.32 CHRONIC DIASTOLIC HEART FAILURE: Status: ACTIVE | Noted: 2023-04-21

## 2023-04-21 PROBLEM — I10 PRIMARY HYPERTENSION: Status: ACTIVE | Noted: 2023-04-21

## 2023-04-21 LAB
ALBUMIN SERPL BCP-MCNC: 3.2 G/DL (ref 3.5–5.2)
ALP SERPL-CCNC: 59 U/L (ref 55–135)
ALT SERPL W/O P-5'-P-CCNC: 19 U/L (ref 10–44)
AMORPH CRY URNS QL MICRO: ABNORMAL
ANION GAP SERPL CALC-SCNC: 11 MMOL/L (ref 8–16)
ANION GAP SERPL CALC-SCNC: 14 MMOL/L (ref 8–16)
AST SERPL-CCNC: 25 U/L (ref 10–40)
BACTERIA #/AREA URNS HPF: ABNORMAL /HPF
BASOPHILS NFR BLD: 0 % (ref 0–1.9)
BILIRUB SERPL-MCNC: 0.7 MG/DL (ref 0.1–1)
BILIRUB UR QL STRIP: NEGATIVE
BUN SERPL-MCNC: 36 MG/DL (ref 8–23)
BUN SERPL-MCNC: 38 MG/DL (ref 8–23)
CALCIUM SERPL-MCNC: 8.4 MG/DL (ref 8.7–10.5)
CALCIUM SERPL-MCNC: 9.8 MG/DL (ref 8.7–10.5)
CHLORIDE SERPL-SCNC: 101 MMOL/L (ref 95–110)
CHLORIDE SERPL-SCNC: 107 MMOL/L (ref 95–110)
CLARITY UR: CLEAR
CO2 SERPL-SCNC: 18 MMOL/L (ref 23–29)
CO2 SERPL-SCNC: 20 MMOL/L (ref 23–29)
COLOR UR: YELLOW
CREAT SERPL-MCNC: 2.1 MG/DL (ref 0.5–1.4)
CREAT SERPL-MCNC: 2.5 MG/DL (ref 0.5–1.4)
CRP SERPL-MCNC: 247.7 MG/L (ref 0–8.2)
DIFFERENTIAL METHOD: ABNORMAL
EOSINOPHIL NFR BLD: 2 % (ref 0–8)
ERYTHROCYTE [DISTWIDTH] IN BLOOD BY AUTOMATED COUNT: 14.1 % (ref 11.5–14.5)
ERYTHROCYTE [SEDIMENTATION RATE] IN BLOOD BY WESTERGREN METHOD: >90 MM/HR (ref 0–10)
EST. GFR  (NO RACE VARIABLE): 28 ML/MIN/1.73 M^2
EST. GFR  (NO RACE VARIABLE): 34.5 ML/MIN/1.73 M^2
GLUCOSE SERPL-MCNC: 148 MG/DL (ref 70–110)
GLUCOSE SERPL-MCNC: 153 MG/DL (ref 70–110)
GLUCOSE UR QL STRIP: NEGATIVE
GRAN CASTS #/AREA URNS LPF: 4 /LPF
HCT VFR BLD AUTO: 35 % (ref 40–54)
HGB BLD-MCNC: 10.9 G/DL (ref 14–18)
HGB UR QL STRIP: ABNORMAL
HYALINE CASTS #/AREA URNS LPF: 2 /LPF
IMM GRANULOCYTES # BLD AUTO: ABNORMAL K/UL
IMM GRANULOCYTES NFR BLD AUTO: ABNORMAL %
INFLUENZA A, MOLECULAR: NEGATIVE
INFLUENZA B, MOLECULAR: NEGATIVE
KETONES UR QL STRIP: ABNORMAL
LACTATE SERPL-SCNC: 0.8 MMOL/L (ref 0.5–2.2)
LACTATE SERPL-SCNC: 1.7 MMOL/L (ref 0.5–2.2)
LEUKOCYTE ESTERASE UR QL STRIP: NEGATIVE
LYMPHOCYTES NFR BLD: 1 % (ref 18–48)
MCH RBC QN AUTO: 28.2 PG (ref 27–31)
MCHC RBC AUTO-ENTMCNC: 31.1 G/DL (ref 32–36)
MCV RBC AUTO: 91 FL (ref 82–98)
MICROSCOPIC COMMENT: ABNORMAL
MONOCYTES NFR BLD: 5 % (ref 4–15)
NEUTROPHILS NFR BLD: 92 % (ref 38–73)
NITRITE UR QL STRIP: NEGATIVE
NRBC BLD-RTO: 0 /100 WBC
PH UR STRIP: 6 [PH] (ref 5–8)
PLATELET # BLD AUTO: 349 K/UL (ref 150–450)
PMV BLD AUTO: 10.9 FL (ref 9.2–12.9)
POCT GLUCOSE: 274 MG/DL (ref 70–110)
POTASSIUM SERPL-SCNC: 5.1 MMOL/L (ref 3.5–5.1)
POTASSIUM SERPL-SCNC: 5.7 MMOL/L (ref 3.5–5.1)
PROT SERPL-MCNC: 8.1 G/DL (ref 6–8.4)
PROT UR QL STRIP: ABNORMAL
RBC # BLD AUTO: 3.86 M/UL (ref 4.6–6.2)
RBC #/AREA URNS HPF: 13 /HPF (ref 0–4)
SARS-COV-2 RDRP RESP QL NAA+PROBE: NEGATIVE
SODIUM SERPL-SCNC: 135 MMOL/L (ref 136–145)
SODIUM SERPL-SCNC: 136 MMOL/L (ref 136–145)
SP GR UR STRIP: 1.02 (ref 1–1.03)
SPECIMEN SOURCE: NORMAL
SQUAMOUS #/AREA URNS HPF: 2 /HPF
URN SPEC COLLECT METH UR: ABNORMAL
UROBILINOGEN UR STRIP-ACNC: NEGATIVE EU/DL
WBC # BLD AUTO: 18.79 K/UL (ref 3.9–12.7)
WBC #/AREA URNS HPF: 4 /HPF (ref 0–5)

## 2023-04-21 PROCEDURE — 73700 CT LOWER EXTREMITY W/O DYE: CPT | Mod: 26,RT,, | Performed by: RADIOLOGY

## 2023-04-21 PROCEDURE — 11000001 HC ACUTE MED/SURG PRIVATE ROOM

## 2023-04-21 PROCEDURE — 87075 CULTR BACTERIA EXCEPT BLOOD: CPT | Performed by: STUDENT IN AN ORGANIZED HEALTH CARE EDUCATION/TRAINING PROGRAM

## 2023-04-21 PROCEDURE — 87186 SC STD MICRODIL/AGAR DIL: CPT | Performed by: NURSE PRACTITIONER

## 2023-04-21 PROCEDURE — 93010 EKG 12-LEAD: ICD-10-PCS | Mod: ,,, | Performed by: INTERNAL MEDICINE

## 2023-04-21 PROCEDURE — 93005 ELECTROCARDIOGRAM TRACING: CPT

## 2023-04-21 PROCEDURE — 73700 CT LOWER EXTREMITY W/O DYE: CPT | Mod: TC,RT

## 2023-04-21 PROCEDURE — 87154 CUL TYP ID BLD PTHGN 6+ TRGT: CPT | Performed by: NURSE PRACTITIONER

## 2023-04-21 PROCEDURE — 93010 ELECTROCARDIOGRAM REPORT: CPT | Mod: ,,, | Performed by: INTERNAL MEDICINE

## 2023-04-21 PROCEDURE — 80048 BASIC METABOLIC PNL TOTAL CA: CPT | Mod: XB | Performed by: STUDENT IN AN ORGANIZED HEALTH CARE EDUCATION/TRAINING PROGRAM

## 2023-04-21 PROCEDURE — 36415 COLL VENOUS BLD VENIPUNCTURE: CPT | Performed by: STUDENT IN AN ORGANIZED HEALTH CARE EDUCATION/TRAINING PROGRAM

## 2023-04-21 PROCEDURE — 83605 ASSAY OF LACTIC ACID: CPT | Performed by: NURSE PRACTITIONER

## 2023-04-21 PROCEDURE — 83605 ASSAY OF LACTIC ACID: CPT | Mod: 91 | Performed by: STUDENT IN AN ORGANIZED HEALTH CARE EDUCATION/TRAINING PROGRAM

## 2023-04-21 PROCEDURE — 85027 COMPLETE CBC AUTOMATED: CPT | Performed by: NURSE PRACTITIONER

## 2023-04-21 PROCEDURE — 81000 URINALYSIS NONAUTO W/SCOPE: CPT | Performed by: NURSE PRACTITIONER

## 2023-04-21 PROCEDURE — 87077 CULTURE AEROBIC IDENTIFY: CPT | Performed by: NURSE PRACTITIONER

## 2023-04-21 PROCEDURE — 87186 SC STD MICRODIL/AGAR DIL: CPT | Mod: 59 | Performed by: STUDENT IN AN ORGANIZED HEALTH CARE EDUCATION/TRAINING PROGRAM

## 2023-04-21 PROCEDURE — U0002 COVID-19 LAB TEST NON-CDC: HCPCS | Performed by: EMERGENCY MEDICINE

## 2023-04-21 PROCEDURE — 73700 CT FOOT WITHOUT CONTRAST RIGHT: ICD-10-PCS | Mod: 26,RT,, | Performed by: RADIOLOGY

## 2023-04-21 PROCEDURE — 71045 XR CHEST AP PORTABLE: ICD-10-PCS | Mod: 26,,, | Performed by: RADIOLOGY

## 2023-04-21 PROCEDURE — 80053 COMPREHEN METABOLIC PANEL: CPT | Performed by: NURSE PRACTITIONER

## 2023-04-21 PROCEDURE — 87070 CULTURE OTHR SPECIMN AEROBIC: CPT | Performed by: STUDENT IN AN ORGANIZED HEALTH CARE EDUCATION/TRAINING PROGRAM

## 2023-04-21 PROCEDURE — 63600175 PHARM REV CODE 636 W HCPCS: Performed by: STUDENT IN AN ORGANIZED HEALTH CARE EDUCATION/TRAINING PROGRAM

## 2023-04-21 PROCEDURE — 87502 INFLUENZA DNA AMP PROBE: CPT | Performed by: EMERGENCY MEDICINE

## 2023-04-21 PROCEDURE — 71045 X-RAY EXAM CHEST 1 VIEW: CPT | Mod: TC

## 2023-04-21 PROCEDURE — 85007 BL SMEAR W/DIFF WBC COUNT: CPT | Performed by: NURSE PRACTITIONER

## 2023-04-21 PROCEDURE — 87076 CULTURE ANAEROBE IDENT EACH: CPT | Performed by: STUDENT IN AN ORGANIZED HEALTH CARE EDUCATION/TRAINING PROGRAM

## 2023-04-21 PROCEDURE — 86140 C-REACTIVE PROTEIN: CPT | Performed by: STUDENT IN AN ORGANIZED HEALTH CARE EDUCATION/TRAINING PROGRAM

## 2023-04-21 PROCEDURE — 25000003 PHARM REV CODE 250: Performed by: INTERNAL MEDICINE

## 2023-04-21 PROCEDURE — 99291 CRITICAL CARE FIRST HOUR: CPT | Mod: 25

## 2023-04-21 PROCEDURE — 99223 1ST HOSP IP/OBS HIGH 75: CPT | Mod: ,,, | Performed by: STUDENT IN AN ORGANIZED HEALTH CARE EDUCATION/TRAINING PROGRAM

## 2023-04-21 PROCEDURE — 25000003 PHARM REV CODE 250: Performed by: NURSE PRACTITIONER

## 2023-04-21 PROCEDURE — 71045 X-RAY EXAM CHEST 1 VIEW: CPT | Mod: 26,,, | Performed by: RADIOLOGY

## 2023-04-21 PROCEDURE — 87077 CULTURE AEROBIC IDENTIFY: CPT | Mod: 59 | Performed by: STUDENT IN AN ORGANIZED HEALTH CARE EDUCATION/TRAINING PROGRAM

## 2023-04-21 PROCEDURE — 25000003 PHARM REV CODE 250: Performed by: EMERGENCY MEDICINE

## 2023-04-21 PROCEDURE — 99223 PR INITIAL HOSPITAL CARE,LEVL III: ICD-10-PCS | Mod: ,,, | Performed by: STUDENT IN AN ORGANIZED HEALTH CARE EDUCATION/TRAINING PROGRAM

## 2023-04-21 PROCEDURE — 85651 RBC SED RATE NONAUTOMATED: CPT | Performed by: STUDENT IN AN ORGANIZED HEALTH CARE EDUCATION/TRAINING PROGRAM

## 2023-04-21 PROCEDURE — 87040 BLOOD CULTURE FOR BACTERIA: CPT | Performed by: NURSE PRACTITIONER

## 2023-04-21 PROCEDURE — 25000003 PHARM REV CODE 250: Performed by: STUDENT IN AN ORGANIZED HEALTH CARE EDUCATION/TRAINING PROGRAM

## 2023-04-21 RX ORDER — MORPHINE SULFATE 2 MG/ML
1 INJECTION, SOLUTION INTRAMUSCULAR; INTRAVENOUS EVERY 4 HOURS PRN
Status: DISCONTINUED | OUTPATIENT
Start: 2023-04-21 | End: 2023-04-24 | Stop reason: HOSPADM

## 2023-04-21 RX ORDER — CLINDAMYCIN PHOSPHATE 600 MG/50ML
600 INJECTION, SOLUTION INTRAVENOUS
Status: DISCONTINUED | OUTPATIENT
Start: 2023-04-21 | End: 2023-04-24 | Stop reason: HOSPADM

## 2023-04-21 RX ORDER — ACETAMINOPHEN 500 MG
1000 TABLET ORAL
Status: COMPLETED | OUTPATIENT
Start: 2023-04-21 | End: 2023-04-21

## 2023-04-21 RX ORDER — IBUPROFEN 200 MG
24 TABLET ORAL
Status: DISCONTINUED | OUTPATIENT
Start: 2023-04-21 | End: 2023-04-24 | Stop reason: HOSPADM

## 2023-04-21 RX ORDER — IBUPROFEN 200 MG
16 TABLET ORAL
Status: DISCONTINUED | OUTPATIENT
Start: 2023-04-21 | End: 2023-04-24 | Stop reason: HOSPADM

## 2023-04-21 RX ORDER — HEPARIN SODIUM 5000 [USP'U]/ML
5000 INJECTION, SOLUTION INTRAVENOUS; SUBCUTANEOUS EVERY 8 HOURS
Status: DISCONTINUED | OUTPATIENT
Start: 2023-04-22 | End: 2023-04-24 | Stop reason: HOSPADM

## 2023-04-21 RX ORDER — OXYCODONE AND ACETAMINOPHEN 5; 325 MG/1; MG/1
1 TABLET ORAL EVERY 6 HOURS PRN
Status: DISCONTINUED | OUTPATIENT
Start: 2023-04-21 | End: 2023-04-24 | Stop reason: HOSPADM

## 2023-04-21 RX ORDER — GLUCAGON 1 MG
1 KIT INJECTION
Status: DISCONTINUED | OUTPATIENT
Start: 2023-04-21 | End: 2023-04-24 | Stop reason: HOSPADM

## 2023-04-21 RX ORDER — INSULIN ASPART 100 [IU]/ML
0-5 INJECTION, SOLUTION INTRAVENOUS; SUBCUTANEOUS
Status: DISCONTINUED | OUTPATIENT
Start: 2023-04-21 | End: 2023-04-24 | Stop reason: HOSPADM

## 2023-04-21 RX ORDER — SODIUM CHLORIDE 9 MG/ML
1000 INJECTION, SOLUTION INTRAVENOUS
Status: COMPLETED | OUTPATIENT
Start: 2023-04-21 | End: 2023-04-21

## 2023-04-21 RX ORDER — SODIUM CHLORIDE, SODIUM LACTATE, POTASSIUM CHLORIDE, CALCIUM CHLORIDE 600; 310; 30; 20 MG/100ML; MG/100ML; MG/100ML; MG/100ML
INJECTION, SOLUTION INTRAVENOUS CONTINUOUS
Status: DISCONTINUED | OUTPATIENT
Start: 2023-04-21 | End: 2023-04-21

## 2023-04-21 RX ORDER — NALOXONE HCL 0.4 MG/ML
0.02 VIAL (ML) INJECTION
Status: DISCONTINUED | OUTPATIENT
Start: 2023-04-21 | End: 2023-04-24 | Stop reason: HOSPADM

## 2023-04-21 RX ORDER — SODIUM CHLORIDE 0.9 % (FLUSH) 0.9 %
10 SYRINGE (ML) INJECTION EVERY 12 HOURS PRN
Status: DISCONTINUED | OUTPATIENT
Start: 2023-04-21 | End: 2023-04-24 | Stop reason: HOSPADM

## 2023-04-21 RX ORDER — SODIUM CHLORIDE 9 MG/ML
INJECTION, SOLUTION INTRAVENOUS CONTINUOUS
Status: DISCONTINUED | OUTPATIENT
Start: 2023-04-21 | End: 2023-04-21

## 2023-04-21 RX ORDER — GABAPENTIN 100 MG/1
100 CAPSULE ORAL 3 TIMES DAILY
Status: DISCONTINUED | OUTPATIENT
Start: 2023-04-21 | End: 2023-04-24 | Stop reason: HOSPADM

## 2023-04-21 RX ORDER — CIPROFLOXACIN 2 MG/ML
400 INJECTION, SOLUTION INTRAVENOUS
Status: DISCONTINUED | OUTPATIENT
Start: 2023-04-21 | End: 2023-04-23 | Stop reason: DRUGHIGH

## 2023-04-21 RX ORDER — ONDANSETRON 2 MG/ML
4 INJECTION INTRAMUSCULAR; INTRAVENOUS EVERY 8 HOURS PRN
Status: DISCONTINUED | OUTPATIENT
Start: 2023-04-21 | End: 2023-04-24 | Stop reason: HOSPADM

## 2023-04-21 RX ORDER — OXYCODONE HYDROCHLORIDE 5 MG/1
5 CAPSULE ORAL
COMMUNITY
End: 2023-05-01

## 2023-04-21 RX ADMIN — CIPROFLOXACIN 400 MG: 2 INJECTION, SOLUTION INTRAVENOUS at 08:04

## 2023-04-21 RX ADMIN — ACETAMINOPHEN 1000 MG: 500 TABLET ORAL at 02:04

## 2023-04-21 RX ADMIN — SODIUM CHLORIDE 1000 ML: 9 INJECTION, SOLUTION INTRAVENOUS at 02:04

## 2023-04-21 RX ADMIN — SODIUM CHLORIDE 1000 ML: 0.9 INJECTION, SOLUTION INTRAVENOUS at 04:04

## 2023-04-21 RX ADMIN — GABAPENTIN 100 MG: 100 CAPSULE ORAL at 08:04

## 2023-04-21 RX ADMIN — CLINDAMYCIN IN 5 PERCENT DEXTROSE 600 MG: 12 INJECTION, SOLUTION INTRAVENOUS at 07:04

## 2023-04-21 RX ADMIN — OXYCODONE HYDROCHLORIDE AND ACETAMINOPHEN 1 TABLET: 5; 325 TABLET ORAL at 07:04

## 2023-04-21 NOTE — ED PROVIDER NOTES
Encounter Date: 4/21/2023       History     Chief Complaint   Patient presents with    Altered Mental Status     62-year-old male, history of recent right forefoot amputation with Dr. Donaldson, here for evaluation of minor confusion, and fever.  Patient was brought by his brother.  Upon arrival here, patient has normal blood pressure, but his temperature is elevated at 102.6.  Patient is somewhat agitated, stating that he does not want to be here.  History of poorly-controlled diabetes, hypertension, and neuropathy.    Review of patient's allergies indicates:  No Known Allergies  Past Medical History:   Diagnosis Date    Diabetes mellitus     Diabetes mellitus, type 2     Hypertension     Neuropathy      Past Surgical History:   Procedure Laterality Date    FOOT AMPUTATION THROUGH METATARSAL Right 4/2/2023    Procedure: AMPUTATION, FOOT, TRANSMETATARSAL;  Surgeon: Orion Donaldson DPM;  Location: [facility];  Service: Podiatry;  Laterality: Right;     History reviewed. No pertinent family history.  Social History     Tobacco Use    Smoking status: Former    Smokeless tobacco: Current   Substance Use Topics    Alcohol use: Not Currently    Drug use: Never     Review of Systems   Constitutional:  Positive for fever.   HENT: Negative.     Eyes: Negative.    Respiratory: Negative.     Cardiovascular: Negative.    Gastrointestinal: Negative.    Endocrine: Negative.    Genitourinary: Negative.    Musculoskeletal: Negative.    Skin: Negative.    Allergic/Immunologic: Negative.    Neurological: Negative.    Hematological: Negative.    Psychiatric/Behavioral: Negative.       Physical Exam     Initial Vitals [04/21/23 1414]   BP Pulse Resp Temp SpO2   (!) 121/58 (!) 119 18 (!) 102.6 °F (39.2 °C) 96 %      MAP       --         Physical Exam    Nursing note and vitals reviewed.  Constitutional: He appears well-developed and well-nourished. No distress.   HENT:   Head: Normocephalic and atraumatic.   Nose: Nose normal.    Mouth/Throat: Oropharynx is clear and moist. No oropharyngeal exudate.   Eyes: Conjunctivae and EOM are normal. Pupils are equal, round, and reactive to light. No scleral icterus.   Neck: Neck supple. No JVD present.   Normal range of motion.  Cardiovascular:  Normal rate, regular rhythm, normal heart sounds and intact distal pulses.           No murmur heard.  Pulmonary/Chest: No stridor. No respiratory distress. He has no wheezes. He has rhonchi (Slight scattered rhonchi bilaterally).   Abdominal: Abdomen is soft. Bowel sounds are normal. He exhibits no distension. There is no abdominal tenderness.   Musculoskeletal:         General: Normal range of motion.      Cervical back: Normal range of motion and neck supple.      Comments: Right foot is wrapped with an Ace wrap overlying gauze wraps.  He is wearing a Velcro boot.  When the boot and the bandage is removed, the remaining portion of the foot is edematous and erythematous, and there is a foul odor.  The bandages were wet.  Sutures appear to be in place.  No obvious necrotic skin.     Neurological: He is alert and oriented to person, place, and time. He has normal strength. No cranial nerve deficit or sensory deficit. GCS score is 15. GCS eye subscore is 4. GCS verbal subscore is 5. GCS motor subscore is 6.   Upon arrival here, patient was somewhat agitated, but he is now calm and reasonable.  He was oriented x3.  He could not remember the correct date but otherwise answered questions appropriately.   Skin: Skin is warm and dry. Capillary refill takes less than 2 seconds. No rash noted. No erythema. No pallor.   Other than the right foot, patient's skin is unremarkable.   Psychiatric: He has a normal mood and affect. His behavior is normal.       ED Course   Critical Care    Date/Time: 4/21/2023 4:27 PM  Performed by: Jonah Stokes MD  Authorized by: Jonah Stokes MD   Direct patient critical care time: 22 minutes  Additional history critical care time: 6  minutes  Ordering / reviewing critical care time: 5 minutes  Documentation critical care time: 11 minutes  Consulting other physicians critical care time: 8 minutes  Total critical care time (exclusive of procedural time) : 52 minutes  Critical care time was exclusive of separately billable procedures and treating other patients and teaching time.  Critical care was necessary to treat or prevent imminent or life-threatening deterioration of the following conditions: sepsis.  Critical care was time spent personally by me on the following activities: discussions with consultants, evaluation of patient's response to treatment, examination of patient, obtaining history from patient or surrogate, ordering and performing treatments and interventions, ordering and review of laboratory studies, ordering and review of radiographic studies, pulse oximetry, re-evaluation of patient's condition and review of old charts.      Labs Reviewed   CBC W/ AUTO DIFFERENTIAL - Abnormal; Notable for the following components:       Result Value    WBC 18.79 (*)     RBC 3.86 (*)     Hemoglobin 10.9 (*)     Hematocrit 35.0 (*)     MCHC 31.1 (*)     Gran % 92.0 (*)     Lymph % 1.0 (*)     All other components within normal limits   COMPREHENSIVE METABOLIC PANEL - Abnormal; Notable for the following components:    Sodium 135 (*)     Potassium 5.7 (*)     CO2 20 (*)     Glucose 153 (*)     BUN 38 (*)     Creatinine 2.5 (*)     Albumin 3.2 (*)     eGFR 28.0 (*)     All other components within normal limits   CULTURE, BLOOD   CULTURE, BLOOD   INFLUENZA A & B BY MOLECULAR   CULTURE, ANAEROBIC   CULTURE, AEROBIC  (SPECIFY SOURCE)   LACTIC ACID, PLASMA   URINALYSIS, REFLEX TO URINE CULTURE   SARS-COV-2 RNA AMPLIFICATION, QUAL   SEDIMENTATION RATE   C-REACTIVE PROTEIN   BASIC METABOLIC PANEL   POCT GLUCOSE MONITORING CONTINUOUS          Imaging Results              X-Ray Chest AP Portable (Final result)  Result time 04/21/23 14:43:29      Final  result by Kasi Lipscomb MD (04/21/23 14:43:29)                   Narrative:    EXAMINATION:  XR CHEST AP PORTABLE    CLINICAL HISTORY:  Fever, unspecified    TECHNIQUE:  Single frontal view of the chest was performed.    COMPARISON:  04/02/2023 and 08/02/2015    FINDINGS:  Prior pulmonary granulomatous disease.  No new airspace disease.  Normal size heart.  No pleural effusion or pneumothorax.      Electronically signed by: Kasi Lipscomb  Date:    04/21/2023  Time:    14:43                                     Medications   sodium chloride 0.9% bolus 1,000 mL 1,000 mL (has no administration in time range)   ciprofloxacin (CIPRO)400mg/200ml D5W IVPB 400 mg (has no administration in time range)   clindamycin in D5W 600 mg/50 mL IVPB 600 mg (has no administration in time range)   sodium chloride 0.9% flush 10 mL (has no administration in time range)   naloxone 0.4 mg/mL injection 0.02 mg (has no administration in time range)   glucose chewable tablet 16 g (has no administration in time range)   glucose chewable tablet 24 g (has no administration in time range)   dextrose 50% injection 12.5 g (has no administration in time range)   dextrose 50% injection 25 g (has no administration in time range)   glucagon (human recombinant) injection 1 mg (has no administration in time range)   heparin (porcine) injection 5,000 Units (has no administration in time range)   ondansetron injection 4 mg (has no administration in time range)   oxyCODONE-acetaminophen 5-325 mg per tablet 1 tablet (has no administration in time range)   morphine injection 1 mg (has no administration in time range)   0.9%  NaCl infusion (has no administration in time range)   glucose chewable tablet 16 g (has no administration in time range)   glucose chewable tablet 24 g (has no administration in time range)   dextrose 50% injection 12.5 g (has no administration in time range)   dextrose 50% injection 25 g (has no administration in time range)    glucagon (human recombinant) injection 1 mg (has no administration in time range)   insulin aspart U-100 pen 0-5 Units (has no administration in time range)   acetaminophen tablet 1,000 mg (1,000 mg Oral Given 4/21/23 1432)   0.9%  NaCl infusion (1,000 mLs Intravenous New Bag 4/21/23 1448)     Medical Decision Making:   Differential Diagnosis:   Sepsis, severe sepsis, osteomyelitis, postsurgical infection, etc.  ED Management:  Lab work shows a [place] count of 508[address].  He had a fever of 102.6 upon arrival.  Patient is likely septic secondary to infection of his right foot.  I have spoken to Dr. Donaldson who suspects same and has asked that the patient be admitted to the hospitalist, and be placed on Cipro and Cleocin.  Dr. Baker has seen the patient and will admit.                        Clinical Impression:   Final diagnoses:  [R50.9] Fever  [A41.9, R65.20] Severe sepsis        ED Disposition Condition    Admit                 Jonah Stokes MD  04/21/23 3923

## 2023-04-21 NOTE — ASSESSMENT & PLAN NOTE
Patient's FSGs are controlled on current medication regimen.  Last A1c reviewed-   Lab Results   Component Value Date    HGBA1C 6.5 (H) 03/31/2023     Most recent fingerstick glucose reviewed- No results for input(s): POCTGLUCOSE in the last 24 hours.  Current correctional scale  Low  Maintain anti-hyperglycemic dose as follows-   Antihyperglycemics (From admission, onward)    Start     Stop Route Frequency Ordered    04/21/23 1726  insulin aspart U-100 pen 0-5 Units         -- SubQ Before meals & nightly PRN 04/21/23 1627        Hold Oral hypoglycemics while patient is in the hospital. Can restart home insulin when patient tolerating PO. Would start lower than home dose since patient with STERLING which will decrease renal clearance of insulin

## 2023-04-21 NOTE — ASSESSMENT & PLAN NOTE
This patient does have evidence of infective focus  My overall impression is sepsis.  Source: Skin and Soft Tissue (location Right foot surgical site)  Antibiotics given-   Antibiotics (72h ago, onward)    Start     Stop Route Frequency Ordered    04/21/23 1730  ciprofloxacin (CIPRO)400mg/200ml D5W IVPB 400 mg         -- IV Every 24 hours (non-standard times) 04/21/23 1616    04/21/23 1730  clindamycin in D5W 600 mg/50 mL IVPB 600 mg         -- IV Every 8 hours (non-standard times) 04/21/23 1616        Latest lactate reviewed-  Recent Labs   Lab 04/21/23  1448   LACTATE 1.7     Organ dysfunction indicated by Acute kidney injury    Fluid challenge Ideal Body Weight- The patient's ideal body weight is Ideal body weight: 68.4 kg (150 lb 12.7 oz) which will be used to calculate fluid bolus of 30 ml/kg for treatment of septic shock.      Post- resuscitation assessment No - Post resuscitation assessment not needed       Will Not start Pressors- Levophed for MAP of 65  Source control achieved by: IV abx, Podiatry consulted- to see patient in am  CT foot w/o contrast ordered    Microbiology Results (last 7 days)     Procedure Component Value Units Date/Time    Influenza A & B by Molecular [[id number]] Collected: 04/21/23 1623    Order Status: Completed Specimen: Nasopharyngeal Swab Updated: 04/21/23 1648     Influenza A, Molecular Negative     Influenza B, Molecular Negative     Flu A & B Source Nasal Swab    Culture, Anaerobe [[id number]] Collected: 04/21/23 1623    Order Status: Sent Specimen: Wound from Foot, Right Updated: 04/21/23 1626    Aerobic culture [[id number]] Collected: 04/21/23 1622    Order Status: Sent Specimen: Wound from Foot, Right Updated: 04/21/23 1623    Blood culture [[id number]] Collected: 04/21/23 1544    Order Status: Sent Specimen: Blood Updated: 04/21/23 1544    Blood culture [[id number]] Collected: 04/21/23 1449    Order Status: Sent Specimen: Blood from Peripheral, Antecubital, Right Updated:  04/21/23 1457

## 2023-04-21 NOTE — PLAN OF CARE
04/21/23 0444   Readmission   Why were you hospitalized in the last 30 days? foot infection   Why were you readmitted? New medical problem   When you left the hospital where did you go? SNF   Did patient/caregiver refused recommended DC plan? Yes   When did you start not feeling well? today   Did you try to manage your symptoms your self? No   Did you call anyone? No   Did you try to see or did see a doctor or nurse before you came? No   Did you have  a follow-up appointment on discharge? Yes   Did you go? Yes   Was this a planned readmission? No

## 2023-04-21 NOTE — SUBJECTIVE & OBJECTIVE
Past Medical History:   Diagnosis Date    Diabetes mellitus     Diabetes mellitus, type 2     Hypertension     Neuropathy        Past Surgical History:   Procedure Laterality Date    FOOT AMPUTATION THROUGH METATARSAL Right 4/2/2023    Procedure: AMPUTATION, FOOT, TRANSMETATARSAL;  Surgeon: Orion Donaldson DPM;  Location: [facility];  Service: Podiatry;  Laterality: Right;       Review of patient's allergies indicates:  No Known Allergies    No current facility-administered medications on file prior to encounter.     Current Outpatient Medications on File Prior to Encounter   Medication Sig    clindamycin (CLEOCIN) 300 MG capsule Take 1 capsule (300 mg total) by mouth 3 (three) times daily. for 14 days    furosemide (LASIX) 20 MG tablet Take 1 tablet (20 mg total) by mouth once daily.    gabapentin (NEURONTIN) 600 MG tablet Take 0.5 tablets (300 mg total) by mouth 3 (three) times daily.    glipiZIDE (GLUCOTROL) 10 MG tablet Take 10 mg by mouth 2 (two) times daily before meals.    insulin detemir U-100, Levemir, (LEVEMIR FLEXPEN) 100 unit/mL (3 mL) InPn pen inject 20 unit by subcutaneous route  every evening    lisinopriL (PRINIVIL,ZESTRIL) 20 MG tablet Take 1 tablet (20 mg total) by mouth once daily.    metFORMIN (GLUCOPHAGE) 1000 MG tablet Take 0.5 tablets (500 mg total) by mouth 2 (two) times daily with meals.    oxyCODONE (OXY-IR) 5 mg Cap Take 5 mg by mouth as needed for Pain.     Family History    Non-contributory       Tobacco Use    Smoking status: Former    Smokeless tobacco: Current   Substance and Sexual Activity    Alcohol use: Not Currently    Drug use: Never    Sexual activity: Not on file     Review of Systems   All other systems reviewed and are negative.  Objective:     Vital Signs (Most Recent):  Temp: 99.1 °F (37.3 °C) (04/21/23 1558)  Pulse: 105 (04/21/23 1534)  Resp: (!) 28 (04/21/23 1534)  BP: (!) 109/57 (04/21/23 1648)  SpO2: 97 % (04/21/23 1648)   Vital Signs (24h Range):  Temp:  [99.1 °F  (37.3 °C)-102.6 °F (39.2 °C)] 99.1 °F (37.3 °C)  Pulse:  [105-119] 105  Resp:  [18-28] 28  SpO2:  [96 %-97 %] 97 %  BP: (109-122)/(57-62) 109/57     Weight: 61.2 kg (135 lb)  Body mass index is 20.53 kg/m².    Physical Exam  Vitals reviewed.   Constitutional:       General: He is not in acute distress.     Appearance: Normal appearance.   HENT:      Head: Normocephalic and atraumatic.   Eyes:      Extraocular Movements: Extraocular movements intact.      Pupils: Pupils are equal, round, and reactive to light.   Cardiovascular:      Rate and Rhythm: Normal rate and regular rhythm.   Pulmonary:      Effort: Pulmonary effort is normal. No respiratory distress.   Abdominal:      Palpations: Abdomen is soft.      Tenderness: There is no abdominal tenderness.   Musculoskeletal:      Right lower leg: No edema.      Left lower leg: No edema.      Comments: Right foot with recent TMA. Stitches in place. No drainage. No crepitus or induration. Some redness near surgical wound extending up foot. Swelling of foot present   Skin:     General: Skin is warm and dry.   Neurological:      General: No focal deficit present.      Mental Status: He is alert and oriented to person, place, and time.   Psychiatric:         Mood and Affect: Mood normal.         Behavior: Behavior normal.         CRANIAL NERVES     CN III, IV, VI   Pupils are equal, round, and reactive to light.     Significant Labs: All pertinent labs within the past 24 hours have been reviewed.    Significant Imaging: I have reviewed all pertinent imaging results/findings within the past 24 hours.

## 2023-04-21 NOTE — ASSESSMENT & PLAN NOTE
Does not appear to be in acute exacerbation  Will hold further IVF after 30cc/kg resuscitation for sepsis  Hold home lasix with STERLING

## 2023-04-21 NOTE — H&P
[facility] Medicine  History & Physical    Patient Name: Juan Pablo Chang  MRN: [id number]  Patient Class: IP- Inpatient  Admission Date: 4/21/2023  Attending Physician: Devin Baker MD   Primary Care Provider: EDNA Butler         Patient information was obtained from patient, past medical records and ER records.     Subjective:     Principal Problem:Sepsis    Chief Complaint:   Chief Complaint   Patient presents with    Altered Mental Status        HPI: 63 y/o male with PMH of T2DM, neuropathy, HTN, HFpEF, recent right TMA due to abscess and possible early osteomyelitis who presents today with fever and redness to right foot. Patient was recently admitted 3/31-4/7 for right foot abscess and early osteo. He underwent right TMA with podiatry. Wound cultures grew staph sensitive to moxifloxacin. He was discharged to SNF but only stayed 3 days. It is unclear if he has been taking abx. He states for past 2-3 days his appetite has been decreased. He has not noticed any pain in the foot or drainage. No NV. No cough, SOB, Weakness. Brother brought patient in today because he was having fever.      Past Medical History:   Diagnosis Date    Diabetes mellitus     Diabetes mellitus, type 2     Hypertension     Neuropathy        Past Surgical History:   Procedure Laterality Date    FOOT AMPUTATION THROUGH METATARSAL Right 4/2/2023    Procedure: AMPUTATION, FOOT, TRANSMETATARSAL;  Surgeon: Orion Donaldson DPM;  Location: [facility];  Service: Podiatry;  Laterality: Right;       Review of patient's allergies indicates:  No Known Allergies    No current facility-administered medications on file prior to encounter.     Current Outpatient Medications on File Prior to Encounter   Medication Sig    clindamycin (CLEOCIN) 300 MG capsule Take 1 capsule (300 mg total) by mouth 3 (three) times daily. for 14 days    furosemide (LASIX) 20 MG tablet Take 1 tablet (20 mg total) by mouth once daily.     gabapentin (NEURONTIN) 600 MG tablet Take 0.5 tablets (300 mg total) by mouth 3 (three) times daily.    glipiZIDE (GLUCOTROL) 10 MG tablet Take 10 mg by mouth 2 (two) times daily before meals.    insulin detemir U-100, Levemir, (LEVEMIR FLEXPEN) 100 unit/mL (3 mL) InPn pen inject 20 unit by subcutaneous route  every evening    lisinopriL (PRINIVIL,ZESTRIL) 20 MG tablet Take 1 tablet (20 mg total) by mouth once daily.    metFORMIN (GLUCOPHAGE) 1000 MG tablet Take 0.5 tablets (500 mg total) by mouth 2 (two) times daily with meals.    oxyCODONE (OXY-IR) 5 mg Cap Take 5 mg by mouth as needed for Pain.     Family History    Non-contributory       Tobacco Use    Smoking status: Former    Smokeless tobacco: Current   Substance and Sexual Activity    Alcohol use: Not Currently    Drug use: Never    Sexual activity: Not on file     Review of Systems   All other systems reviewed and are negative.  Objective:     Vital Signs (Most Recent):  Temp: 99.1 °F (37.3 °C) (04/21/23 1558)  Pulse: 105 (04/21/23 1534)  Resp: (!) 28 (04/21/23 1534)  BP: (!) 109/57 (04/21/23 1648)  SpO2: 97 % (04/21/23 1648)   Vital Signs (24h Range):  Temp:  [99.1 °F (37.3 °C)-102.6 °F (39.2 °C)] 99.1 °F (37.3 °C)  Pulse:  [105-119] 105  Resp:  [18-28] 28  SpO2:  [96 %-97 %] 97 %  BP: (109-122)/(57-62) 109/57     Weight: 61.2 kg (135 lb)  Body mass index is 20.53 kg/m².    Physical Exam  Vitals reviewed.   Constitutional:       General: He is not in acute distress.     Appearance: Normal appearance.   HENT:      Head: Normocephalic and atraumatic.   Eyes:      Extraocular Movements: Extraocular movements intact.      Pupils: Pupils are equal, round, and reactive to light.   Cardiovascular:      Rate and Rhythm: Normal rate and regular rhythm.   Pulmonary:      Effort: Pulmonary effort is normal. No respiratory distress.   Abdominal:      Palpations: Abdomen is soft.      Tenderness: There is no abdominal tenderness.   Musculoskeletal:      Right lower  leg: No edema.      Left lower leg: No edema.      Comments: Right foot with recent TMA. Stitches in place. No drainage. No crepitus or induration. Some redness near surgical wound extending up foot. Swelling of foot present   Skin:     General: Skin is warm and dry.   Neurological:      General: No focal deficit present.      Mental Status: He is alert and oriented to person, place, and time.   Psychiatric:         Mood and Affect: Mood normal.         Behavior: Behavior normal.         CRANIAL NERVES     CN III, IV, VI   Pupils are equal, round, and reactive to light.     Significant Labs: All pertinent labs within the past 24 hours have been reviewed.    Significant Imaging: I have reviewed all pertinent imaging results/findings within the past 24 hours.    Assessment/Plan:     * Sepsis  This patient does have evidence of infective focus  My overall impression is sepsis.  Source: Skin and Soft Tissue (location Right foot surgical site)  Antibiotics given-   Antibiotics (72h ago, onward)      Start     Stop Route Frequency Ordered    04/21/23 1730  ciprofloxacin (CIPRO)400mg/200ml D5W IVPB 400 mg         -- IV Every 24 hours (non-standard times) 04/21/23 1616    04/21/23 1730  clindamycin in D5W 600 mg/50 mL IVPB 600 mg         -- IV Every 8 hours (non-standard times) 04/21/23 1616          Latest lactate reviewed-  Recent Labs   Lab 04/21/23  1448   LACTATE 1.7     Organ dysfunction indicated by Acute kidney injury    Fluid challenge Ideal Body Weight- The patient's ideal body weight is Ideal body weight: 68.4 kg (150 lb 12.7 oz) which will be used to calculate fluid bolus of 30 ml/kg for treatment of septic shock.      Post- resuscitation assessment No - Post resuscitation assessment not needed       Will Not start Pressors- Levophed for MAP of 65  Source control achieved by: IV abx, Podiatry consulted- to see patient in am  CT foot w/o contrast ordered    Microbiology Results (last 7 days)       Procedure  Component Value Units Date/Time    Influenza A & B by Molecular [[id number]] Collected: 04/21/23 1623    Order Status: Completed Specimen: Nasopharyngeal Swab Updated: 04/21/23 1648     Influenza A, Molecular Negative     Influenza B, Molecular Negative     Flu A & B Source Nasal Swab    Culture, Anaerobe [[id number]] Collected: 04/21/23 1623    Order Status: Sent Specimen: Wound from Foot, Right Updated: 04/21/23 1626    Aerobic culture [[id number]] Collected: 04/21/23 1622    Order Status: Sent Specimen: Wound from Foot, Right Updated: 04/21/23 1623    Blood culture [[id number]] Collected: 04/21/23 1544    Order Status: Sent Specimen: Blood Updated: 04/21/23 1544    Blood culture [[id number]] Collected: 04/21/23 1449    Order Status: Sent Specimen: Blood from Peripheral, Antecubital, Right Updated: 04/21/23 1450              Hyperkalemia  Mild hyperkalemia- ECG w/o peaked T waves- Holding on calcium gluconate and shifting for now  Repeat BMP after patient volume resuscitated  Hold medications that can increase potassium      Chronic diastolic heart failure  Does not appear to be in acute exacerbation  Will hold further IVF after 30cc/kg resuscitation for sepsis  Hold home lasix with STERLING      Primary hypertension  Hold home lisinopril in setting of STERLING      Type 2 diabetes mellitus with hypoglycemia without coma, without long-term current use of insulin  Patient's FSGs are controlled on current medication regimen.  Last A1c reviewed-   Lab Results   Component Value Date    HGBA1C 6.5 (H) 03/31/2023     Most recent fingerstick glucose reviewed- No results for input(s): POCTGLUCOSE in the last 24 hours.  Current correctional scale  Low  Maintain anti-hyperglycemic dose as follows-   Antihyperglycemics (From admission, onward)      Start     Stop Route Frequency Ordered    04/21/23 1726  insulin aspart U-100 pen 0-5 Units         -- SubQ Before meals & nightly PRN 04/21/23 1627          Hold Oral hypoglycemics while  patient is in the hospital. Can restart home insulin when patient tolerating PO. Would start lower than home dose since patient with STERLING which will decrease renal clearance of insulin    STERLING (acute kidney injury)  Patient with acute kidney injury likely due to IVVD/dehydration STERLING is currently unclear. Labs reviewed- Renal function/electrolytes with Estimated Creatinine Clearance: 25.8 mL/min (A) (based on SCr of 2.5 mg/dL (H)). according to latest data. Monitor urine output and serial BMP and adjust therapy as needed. Avoid nephrotoxins and renally dose meds for GFR listed above.         VTE Risk Mitigation (From admission, onward)           Ordered     heparin (porcine) injection 5,000 Units  Every 8 hours         04/21/23 1625     IP VTE HIGH RISK PATIENT  Once         04/21/23 1625     Place sequential compression device  Until discontinued         04/21/23 1625                               Devin Baker MD  Department of Hospital Medicine  [facility] Emergency Dept

## 2023-04-21 NOTE — ED TRIAGE NOTES
Patient brought to ED POV by his brother for evaluation. Per his brother, the patient has been confused and unable to care for himself since waking up this morning. The patient is angry, does not want to be here. He denies complaints. His brother reports that the patient had part of his right foot amputated by Dr. Medina. The patient is awake, alert, oriented to self, birthday, and location. He is disoriented to the year. Surgical boot noted to right foot. The patients brother reports that the patient took one oxycodone this morning.

## 2023-04-21 NOTE — ASSESSMENT & PLAN NOTE
Mild hyperkalemia- ECG w/o peaked T waves- Holding on calcium gluconate and shifting for now  Repeat BMP after patient volume resuscitated  Hold medications that can increase potassium

## 2023-04-21 NOTE — ASSESSMENT & PLAN NOTE
Patient with acute kidney injury likely due to IVVD/dehydration STERLING is currently unclear. Labs reviewed- Renal function/electrolytes with Estimated Creatinine Clearance: 25.8 mL/min (A) (based on SCr of 2.5 mg/dL (H)). according to latest data. Monitor urine output and serial BMP and adjust therapy as needed. Avoid nephrotoxins and renally dose meds for GFR listed above.

## 2023-04-21 NOTE — PLAN OF CARE
[facility], [place], operated by [facility]  Initial Discharge Assessment       Primary Care Provider: EDNA Butler    Admission Diagnosis: Severe sepsis [A41.9, R65.20]    Admission Date: 4/21/2023  Expected Discharge Date:   Assessment completed with patient who was alert and oriented. Patient is currently living with his brother, Earlene Chang [phone]. Upon discharge at last admit, he went to [facility]. Patient only stayed 3 of the 7 days. His brother drove to pick him up. Patient uses a surgery boot and a rollator at home. He and his brother stated that he did attend his PCP follow up on 4/18. Patient's PCP is Kori Clark NP, he has [place] medicaid and uses [facility]. Patient denies any needs at this time. Case management will continue to follow.   Discharge Barriers Identified: None    Payor[place] / Plan[place] / Product Type: Government /     Extended Emergency Contact Information  Primary Emergency Contact: Earlene Chang  Mobile Phone: [phone]  Relation: Brother   needed? No    Discharge Plan A: Home with family  Discharge Plan B: Home with family      [id number] [place] [address] [facility] [address]  [address] [place] [id number]  Phone: [phone] Fax: [phone]      Initial Assessment (most recent)       Adult Discharge Assessment - 04/21/23 1650          Discharge Assessment    Assessment Type Discharge Planning Assessment     Confirmed/corrected address, phone number and insurance Yes     Confirmed Demographics Correct on Facesheet     Source of Information patient     Does patient/caregiver understand observation status Yes     Communicated MARION with patient/caregiver Date not available/Unable to determine     Reason For Admission severe sepsis     People in Home sibling(s)   Earlene Chang [phone]    Do you expect to return to your current living situation? Yes     Do you have help at home or  someone to help you manage your care at home? Yes     Who are your caregiver(s) and their phone number(s)? Earlene Chang [phone]     Prior to hospitilization cognitive status: Unable to Assess     Current cognitive status: Alert/Oriented     Walking or Climbing Stairs ambulation difficulty, requires equipment     Mobility Management surgery boot     Equipment Currently Used at Home other (see comments);rollator   surgery boot    Readmission within 30 days? Yes     Patient currently being followed by outpatient case management? No     Do you currently have service(s) that help you manage your care at home? No     Do you take prescription medications? Yes     Do you have prescription coverage? Yes     Coverage medicaid     Do you have any problems affording any of your prescribed medications? No     Is the patient taking medications as prescribed? yes     Who is going to help you get home at discharge? Earlene Chang [phone]     How do you get to doctors appointments? family or friend will provide     Are you on dialysis? No     Do you take coumadin? No     Discharge Plan A Home with family     Discharge Plan B Home with family     DME Needed Upon Discharge  none     Discharge Plan discussed with: Patient     Discharge Barriers Identified None        Physical Activity    On average, how many days per week do you engage in moderate to strenuous exercise (like a brisk walk)? 0 days     On average, how many minutes do you engage in exercise at this level? 0 min        Financial Resource Strain    How hard is it for you to pay for the very basics like food, housing, medical care, and heating? Patient refused        Housing Stability    In the last 12 months, was there a time when you were not able to pay the mortgage or rent on time? Patient refused     In the last 12 months, was there a time when you did not have a steady place to sleep or slept in a shelter (including now)? Patient refused         Transportation Needs    In the past 12 months, has lack of transportation kept you from medical appointments or from getting medications? No     In the past 12 months, has lack of transportation kept you from meetings, work, or from getting things needed for daily living? No        Food Insecurity    Within the past 12 months, you worried that your food would run out before you got the money to buy more. Patient refused     Within the past 12 months, the food you bought just didn't last and you didn't have money to get more. Patient refused        Stress    Do you feel stress - tense, restless, nervous, or anxious, or unable to sleep at night because your mind is troubled all the time - these days? Patient refused        Social Connections    In a typical week, how many times do you talk on the phone with family, friends, or neighbors? Three times a week     How often do you get together with friends or relatives? More than three times a week     How often do you attend Quaker or Taoism services? Never     Do you belong to any clubs or organizations such as Quaker groups, unions, fraternal or athletic groups, or school groups? No     How often do you attend meetings of the clubs or organizations you belong to? Never     Are you , , , , never , or living with a partner? Patient refused        Alcohol Use    Q1: How often do you have a drink containing alcohol? Patient refused     Q2: How many drinks containing alcohol do you have on a typical day when you are drinking? Patient refused     Q3: How often do you have six or more drinks on one occasion? Patient refused        OTHER    Name(s) of People in Home Earlene Chang [phone]                     Readmission Assessment (most recent)       Readmission Assessment - 04/21/23 0444          Readmission    Why were you hospitalized in the last 30 days? foot infection     Why were you readmitted? New medical problem     When  you left the hospital where did you go? SNF     Did patient/caregiver refused recommended DC plan? Yes     When did you start not feeling well? today     Did you try to manage your symptoms your self? No     Did you call anyone? No     Did you try to see or did see a doctor or nurse before you came? No     Did you have  a follow-up appointment on discharge? Yes     Did you go? Yes     Was this a planned readmission? No

## 2023-04-21 NOTE — HPI
61 y/o male with PMH of T2DM, neuropathy, HTN, HFpEF, recent right TMA due to abscess and possible early osteomyelitis who presents today with fever and redness to right foot. Patient was recently admitted 3/31-4/7 for right foot abscess and early osteo. He underwent right TMA with podiatry. Wound cultures grew staph sensitive to moxifloxacin. He was discharged to SNF but only stayed 3 days. It is unclear if he has been taking abx. He states for past 2-3 days his appetite has been decreased. He has not noticed any pain in the foot or drainage. No NV. No cough, SOB, Weakness. Brother brought patient in today because he was having fever.

## 2023-04-22 PROBLEM — Z91.199 NONCOMPLIANCE WITH TREATMENT: Status: ACTIVE | Noted: 2023-04-22

## 2023-04-22 LAB
ACINETOBACTER CALCOACETICUS/BAUMANNII COMPLEX: NOT DETECTED
ALBUMIN SERPL BCP-MCNC: 2.6 G/DL (ref 3.5–5.2)
ALP SERPL-CCNC: 54 U/L (ref 55–135)
ALT SERPL W/O P-5'-P-CCNC: 20 U/L (ref 10–44)
ANION GAP SERPL CALC-SCNC: 10 MMOL/L (ref 8–16)
AST SERPL-CCNC: 28 U/L (ref 10–40)
BACTEROIDES FRAGILIS: NOT DETECTED
BASOPHILS # BLD AUTO: 0.02 K/UL (ref 0–0.2)
BASOPHILS NFR BLD: 0.2 % (ref 0–1.9)
BILIRUB SERPL-MCNC: 0.4 MG/DL (ref 0.1–1)
BUN SERPL-MCNC: 32 MG/DL (ref 8–23)
CALCIUM SERPL-MCNC: 8.5 MG/DL (ref 8.7–10.5)
CANDIDA ALBICANS: NOT DETECTED
CANDIDA AURIS: NOT DETECTED
CANDIDA GLABRATA: NOT DETECTED
CANDIDA KRUSEI: NOT DETECTED
CANDIDA PARAPSILOSIS: NOT DETECTED
CANDIDA TROPICALIS: NOT DETECTED
CHLORIDE SERPL-SCNC: 107 MMOL/L (ref 95–110)
CO2 SERPL-SCNC: 18 MMOL/L (ref 23–29)
CREAT SERPL-MCNC: 1.8 MG/DL (ref 0.5–1.4)
CRYPTOCOCCUS NEOFORMANS/GATTII: NOT DETECTED
CTX-M GENE: NOT DETECTED
DIFFERENTIAL METHOD: ABNORMAL
ENTEROBACTER CLOACAE COMPLEX: NOT DETECTED
ENTEROBACTERALES: ABNORMAL
ENTEROCOCCUS FAECALIS: NOT DETECTED
ENTEROCOCCUS FAECIUM: NOT DETECTED
EOSINOPHIL # BLD AUTO: 0 K/UL (ref 0–0.5)
EOSINOPHIL NFR BLD: 0.1 % (ref 0–8)
ERYTHROCYTE [DISTWIDTH] IN BLOOD BY AUTOMATED COUNT: 14 % (ref 11.5–14.5)
ESCHERICHIA COLI: DETECTED
EST. GFR  (NO RACE VARIABLE): 41.5 ML/MIN/1.73 M^2
GLUCOSE SERPL-MCNC: 165 MG/DL (ref 70–110)
HAEMOPHILUS INFLUENZAE: NOT DETECTED
HCT VFR BLD AUTO: 28.3 % (ref 40–54)
HGB BLD-MCNC: 9.1 G/DL (ref 14–18)
IMM GRANULOCYTES # BLD AUTO: 0.03 K/UL (ref 0–0.04)
IMM GRANULOCYTES NFR BLD AUTO: 0.3 % (ref 0–0.5)
IMP GENE: NOT DETECTED
KLEBSIELLA AEROGENES: NOT DETECTED
KLEBSIELLA OXYTOCA: NOT DETECTED
KLEBSIELLA PNEUMONIAE GROUP: NOT DETECTED
KPC: NOT DETECTED
LISTERIA MONOCYTOGENES: NOT DETECTED
LYMPHOCYTES # BLD AUTO: 0.3 K/UL (ref 1–4.8)
LYMPHOCYTES NFR BLD: 2.7 % (ref 18–48)
MAGNESIUM SERPL-MCNC: 1.7 MG/DL (ref 1.6–2.6)
MCH RBC QN AUTO: 28.1 PG (ref 27–31)
MCHC RBC AUTO-ENTMCNC: 32.2 G/DL (ref 32–36)
MCR-1: NOT DETECTED
MCV RBC AUTO: 87 FL (ref 82–98)
MEC A/C AND MREJ (MRSA): NOT DETECTED
MEC A/C: NOT DETECTED
MONOCYTES # BLD AUTO: 0.5 K/UL (ref 0.3–1)
MONOCYTES NFR BLD: 5.1 % (ref 4–15)
NDM GENE: NOT DETECTED
NEISSERIA MENINGITIDIS: NOT DETECTED
NEUTROPHILS # BLD AUTO: 9.3 K/UL (ref 1.8–7.7)
NEUTROPHILS NFR BLD: 91.6 % (ref 38–73)
NRBC BLD-RTO: 0 /100 WBC
OXA-48-LIKE: NOT DETECTED
PLATELET # BLD AUTO: 239 K/UL (ref 150–450)
PMV BLD AUTO: 10.2 FL (ref 9.2–12.9)
POCT GLUCOSE: 189 MG/DL (ref 70–110)
POCT GLUCOSE: 210 MG/DL (ref 70–110)
POCT GLUCOSE: 262 MG/DL (ref 70–110)
POTASSIUM SERPL-SCNC: 5.1 MMOL/L (ref 3.5–5.1)
PROT SERPL-MCNC: 6.5 G/DL (ref 6–8.4)
PROTEUS SPECIES: NOT DETECTED
PSEUDOMONAS AERUGINOSA: NOT DETECTED
RBC # BLD AUTO: 3.24 M/UL (ref 4.6–6.2)
SALMONELLA SP: NOT DETECTED
SERRATIA MARCESCENS: NOT DETECTED
SODIUM SERPL-SCNC: 135 MMOL/L (ref 136–145)
STAPHYLOCOCCUS AUREUS: NOT DETECTED
STAPHYLOCOCCUS EPIDERMIDIS: NOT DETECTED
STAPHYLOCOCCUS LUGDUNESIS: NOT DETECTED
STAPHYLOCOCCUS SPECIES: NOT DETECTED
STENOTROPHOMONAS MALTOPHILIA: NOT DETECTED
STREPTOCOCCUS AGALACTIAE: NOT DETECTED
STREPTOCOCCUS PNEUMONIAE: NOT DETECTED
STREPTOCOCCUS PYOGENES: NOT DETECTED
STREPTOCOCCUS SPECIES: NOT DETECTED
VAN A/B: NOT DETECTED
VIM GENE: NOT DETECTED
WBC # BLD AUTO: 10.12 K/UL (ref 3.9–12.7)

## 2023-04-22 PROCEDURE — 11000001 HC ACUTE MED/SURG PRIVATE ROOM

## 2023-04-22 PROCEDURE — 99223 1ST HOSP IP/OBS HIGH 75: CPT | Mod: 24,,, | Performed by: PODIATRIST

## 2023-04-22 PROCEDURE — 99223 PR INITIAL HOSPITAL CARE,LEVL III: ICD-10-PCS | Mod: 24,,, | Performed by: PODIATRIST

## 2023-04-22 PROCEDURE — 80053 COMPREHEN METABOLIC PANEL: CPT | Performed by: STUDENT IN AN ORGANIZED HEALTH CARE EDUCATION/TRAINING PROGRAM

## 2023-04-22 PROCEDURE — 99232 SBSQ HOSP IP/OBS MODERATE 35: CPT | Mod: ,,, | Performed by: HOSPITALIST

## 2023-04-22 PROCEDURE — 83735 ASSAY OF MAGNESIUM: CPT | Performed by: STUDENT IN AN ORGANIZED HEALTH CARE EDUCATION/TRAINING PROGRAM

## 2023-04-22 PROCEDURE — 63600175 PHARM REV CODE 636 W HCPCS: Performed by: STUDENT IN AN ORGANIZED HEALTH CARE EDUCATION/TRAINING PROGRAM

## 2023-04-22 PROCEDURE — 85025 COMPLETE CBC W/AUTO DIFF WBC: CPT | Performed by: STUDENT IN AN ORGANIZED HEALTH CARE EDUCATION/TRAINING PROGRAM

## 2023-04-22 PROCEDURE — 99232 PR SUBSEQUENT HOSPITAL CARE,LEVL II: ICD-10-PCS | Mod: ,,, | Performed by: HOSPITALIST

## 2023-04-22 PROCEDURE — 25000003 PHARM REV CODE 250: Performed by: INTERNAL MEDICINE

## 2023-04-22 PROCEDURE — 25000003 PHARM REV CODE 250: Performed by: STUDENT IN AN ORGANIZED HEALTH CARE EDUCATION/TRAINING PROGRAM

## 2023-04-22 PROCEDURE — 25000003 PHARM REV CODE 250: Performed by: HOSPITALIST

## 2023-04-22 RX ORDER — SODIUM BICARBONATE 650 MG/1
650 TABLET ORAL 2 TIMES DAILY
Status: DISCONTINUED | OUTPATIENT
Start: 2023-04-22 | End: 2023-04-24

## 2023-04-22 RX ORDER — MUPIROCIN 20 MG/G
OINTMENT TOPICAL 2 TIMES DAILY
Status: DISCONTINUED | OUTPATIENT
Start: 2023-04-22 | End: 2023-04-24 | Stop reason: HOSPADM

## 2023-04-22 RX ADMIN — HEPARIN SODIUM 5000 UNITS: 5000 INJECTION, SOLUTION INTRAVENOUS; SUBCUTANEOUS at 02:04

## 2023-04-22 RX ADMIN — HEPARIN SODIUM 5000 UNITS: 5000 INJECTION, SOLUTION INTRAVENOUS; SUBCUTANEOUS at 06:04

## 2023-04-22 RX ADMIN — HEPARIN SODIUM 5000 UNITS: 5000 INJECTION, SOLUTION INTRAVENOUS; SUBCUTANEOUS at 09:04

## 2023-04-22 RX ADMIN — CLINDAMYCIN IN 5 PERCENT DEXTROSE 600 MG: 12 INJECTION, SOLUTION INTRAVENOUS at 07:04

## 2023-04-22 RX ADMIN — MORPHINE SULFATE 1 MG: 2 INJECTION, SOLUTION INTRAMUSCULAR; INTRAVENOUS at 09:04

## 2023-04-22 RX ADMIN — GABAPENTIN 100 MG: 100 CAPSULE ORAL at 08:04

## 2023-04-22 RX ADMIN — CIPROFLOXACIN 400 MG: 2 INJECTION, SOLUTION INTRAVENOUS at 04:04

## 2023-04-22 RX ADMIN — MORPHINE SULFATE 1 MG: 2 INJECTION, SOLUTION INTRAMUSCULAR; INTRAVENOUS at 02:04

## 2023-04-22 RX ADMIN — SODIUM BICARBONATE 650 MG TABLET 650 MG: at 09:04

## 2023-04-22 RX ADMIN — MUPIROCIN: 20 OINTMENT TOPICAL at 09:04

## 2023-04-22 RX ADMIN — MUPIROCIN: 20 OINTMENT TOPICAL at 08:04

## 2023-04-22 RX ADMIN — OXYCODONE HYDROCHLORIDE AND ACETAMINOPHEN 1 TABLET: 5; 325 TABLET ORAL at 07:04

## 2023-04-22 RX ADMIN — GABAPENTIN 100 MG: 100 CAPSULE ORAL at 09:04

## 2023-04-22 RX ADMIN — INSULIN ASPART 2 UNITS: 100 INJECTION, SOLUTION INTRAVENOUS; SUBCUTANEOUS at 08:04

## 2023-04-22 RX ADMIN — SODIUM BICARBONATE 650 MG TABLET 650 MG: at 08:04

## 2023-04-22 RX ADMIN — INSULIN ASPART 3 UNITS: 100 INJECTION, SOLUTION INTRAVENOUS; SUBCUTANEOUS at 11:04

## 2023-04-22 RX ADMIN — MORPHINE SULFATE 1 MG: 2 INJECTION, SOLUTION INTRAMUSCULAR; INTRAVENOUS at 03:04

## 2023-04-22 RX ADMIN — CLINDAMYCIN IN 5 PERCENT DEXTROSE 600 MG: 12 INJECTION, SOLUTION INTRAVENOUS at 03:04

## 2023-04-22 RX ADMIN — CLINDAMYCIN IN 5 PERCENT DEXTROSE 600 MG: 12 INJECTION, SOLUTION INTRAVENOUS at 11:04

## 2023-04-22 RX ADMIN — OXYCODONE HYDROCHLORIDE AND ACETAMINOPHEN 1 TABLET: 5; 325 TABLET ORAL at 02:04

## 2023-04-22 RX ADMIN — OXYCODONE HYDROCHLORIDE AND ACETAMINOPHEN 1 TABLET: 5; 325 TABLET ORAL at 12:04

## 2023-04-22 RX ADMIN — GABAPENTIN 100 MG: 100 CAPSULE ORAL at 02:04

## 2023-04-22 NOTE — PLAN OF CARE
Problem: Adult Inpatient Plan of Care  Goal: Plan of Care Review  Outcome: Ongoing, Progressing     Problem: Adult Inpatient Plan of Care  Goal: Patient-Specific Goal (Individualized)  Outcome: Ongoing, Progressing     Problem: Adult Inpatient Plan of Care  Goal: Absence of Hospital-Acquired Illness or Injury  Outcome: Ongoing, Progressing     Problem: Adult Inpatient Plan of Care  Goal: Optimal Comfort and Wellbeing  Outcome: Ongoing, Progressing     Problem: Adult Inpatient Plan of Care  Goal: Readiness for Transition of Care  Outcome: Ongoing, Progressing     Problem: Diabetes Comorbidity  Goal: Blood Glucose Level Within Targeted Range  Outcome: Ongoing, Not Progressing     Problem: Adjustment to Illness (Sepsis/Septic Shock)  Goal: Optimal Coping  Outcome: Ongoing, Progressing     Problem: Bleeding (Sepsis/Septic Shock)  Goal: Absence of Bleeding  Outcome: Met     Problem: Infection Progression (Sepsis/Septic Shock)  Goal: Absence of Infection Signs and Symptoms  Outcome: Ongoing, Progressing     Problem: Fluid and Electrolyte Imbalance (Acute Kidney Injury/Impairment)  Goal: Fluid and Electrolyte Balance  Outcome: Ongoing, Progressing     Problem: Oral Intake Inadequate (Acute Kidney Injury/Impairment)  Goal: Optimal Nutrition Intake  Outcome: Ongoing, Progressing     Problem: Fall Injury Risk  Goal: Absence of Fall and Fall-Related Injury  Outcome: Ongoing, Progressing

## 2023-04-22 NOTE — CONSULTS
Consults  [facility] Intensive Care  Podiatry  H&P    Patient Name: Juan Pablo Chang  MRN: [id number]  Admission Date: 4/21/2023  Attending Physician: Devin Baker MD  Primary Care Provider: EDNA Butler     Subjective:     History of Present Illness:  Patient presents today for initial inpatient consultation due to history of partial right foot amputation.  Patient was admitted to the hospital yesterday with a fever and chills.  Patient underwent a partial foot amputation approximately 3 weeks ago he was only seen in the office once for a postoperative evaluation earlier this week at that time it was noted that the patient had not taken the oral antibiotics prescribed to him by the hospitalist at the time of discharge from the hospital he also signed out of rehab after a proximally 2 and half to 3 days where he was receiving antibiotic therapy and physical therapy.  Patient states he left rehab because they were not doing anything for him.  Patient does have a history of noncompliance and clearly the patient has been noncompliant following partial amputation.    Scheduled Meds:   ciprofloxacin  400 mg Intravenous Q24H    clindamycin (CLEOCIN) IVPB  600 mg Intravenous Q8H    gabapentin  100 mg Oral TID    heparin (porcine)  5,000 Units Subcutaneous Q8H    mupirocin   Nasal BID    sodium bicarbonate  650 mg Oral BID     Continuous Infusions:  PRN Meds:dextrose 50%, dextrose 50%, dextrose 50%, dextrose 50%, glucagon (human recombinant), glucagon (human recombinant), glucose, glucose, glucose, glucose, insulin aspart U-100, morphine, naloxone, ondansetron, oxyCODONE-acetaminophen, sodium chloride 0.9%    Review of patient's allergies indicates:  No Known Allergies     Past Medical History:   Diagnosis Date    Diabetes mellitus     Diabetes mellitus, type 2     Hypertension     Neuropathy      Past Surgical History:   Procedure Laterality Date    FOOT AMPUTATION THROUGH METATARSAL Right 4/2/2023    Procedure:  AMPUTATION, FOOT, TRANSMETATARSAL;  Surgeon: Orion Donaldson DPM;  Location: [facility];  Service: Podiatry;  Laterality: Right;       Family History    None       Tobacco Use    Smoking status: Former    Smokeless tobacco: Current   Substance and Sexual Activity    Alcohol use: Not Currently    Drug use: Never    Sexual activity: Not on file     Review of Systems   Skin:  Positive for color change.   Neurological:  Positive for numbness.   All other systems reviewed and are negative.  Objective:     Vital Signs (Most Recent):  Temp: 98.8 °F (37.1 °C) (04/22/23 0719)  Pulse: 86 (04/22/23 0719)  Resp: 17 (04/22/23 0719)  BP: (!) 112/56 (04/22/23 0719)  SpO2: 98 % (04/22/23 0719)   Vital Signs (24h Range):  Temp:  [96.2 °F (35.7 °C)-102.6 °F (39.2 °C)] 98.8 °F (37.1 °C)  Pulse:  [] 86  Resp:  [17-28] 17  SpO2:  [96 %-100 %] 98 %  BP: (109-139)/(56-72) 112/56     Weight: 61.3 kg (135 lb 2.3 oz)  Body mass index is 20.55 kg/m².    Foot Exam    Right Foot/Ankle     Inspection and Palpation  Skin Exam: erythema;     Neurovascular  Dorsalis pedis: 1+  Posterior tibial: 1+      Left Foot/Ankle      Neurovascular  Dorsalis pedis: 1+  Posterior tibial: 1+      Physical Exam  Vitals and nursing note reviewed.   Constitutional:       Appearance: Normal appearance.   Cardiovascular:      Pulses:           Dorsalis pedis pulses are 1+ on the right side and 1+ on the left side.        Posterior tibial pulses are 1+ on the right side and 1+ on the left side.   Pulmonary:      Effort: Pulmonary effort is normal.   Musculoskeletal:         General: Swelling present.      Right lower leg: Edema present.      Right foot: Deformity present.        Feet:    Feet:      Right foot:      Protective Sensation: 4 sites tested.  0 sites sensed.      Skin integrity: Erythema and warmth present.      Left foot:      Protective Sensation: 4 sites tested.  0 sites sensed.   Skin:     Capillary Refill: Capillary refill takes more than 3  seconds.   Neurological:      Mental Status: He is alert.      Sensory: Sensory deficit present.   Psychiatric:         Mood and Affect: Mood normal.         Behavior: Behavior normal.         Laboratory:  All pertinent labs reviewed within the last 24 hours.    Diagnostic Results:  I have reviewed all pertinent imaging results/findings within the past 24 hours.    Clinical Findings:                Assessment/Plan:     Active Diagnoses:    Diagnosis Date Noted POA    PRINCIPAL PROBLEM:  Sepsis [A41.9] 04/21/2023 Yes    Noncompliance with treatment [Z91.199] 04/22/2023 Not Applicable    Primary hypertension [I10] 04/21/2023 Yes    Chronic diastolic heart failure [I50.32] 04/21/2023 Yes    Hyperkalemia [E87.5] 04/21/2023 Yes    STERLING (acute kidney injury) [N17.9] 04/01/2023 Yes    Type 2 diabetes mellitus with hypoglycemia without coma, without long-term current use of insulin [E11.649] 04/01/2023 Yes      Problems Resolved During this Admission:     Patient presents today for initial inpatient consultation due to history of partial right foot amputation.  Patient was admitted to the hospital yesterday with a fever and chills.  Patient underwent a partial foot amputation approximately 3 weeks ago he was only seen in the office once for a postoperative evaluation earlier this week at that time it was noted that the patient had not taken the oral antibiotics prescribed to him by the hospitalist at the time of discharge from the hospital he also signed out of rehab after a proximally 2 and half to 3 days where he was receiving antibiotic therapy and physical therapy.  Patient states he left rehab because they were not doing anything for him.  Patient does have a history of noncompliance and clearly the patient has been noncompliant following partial amputation.  When the patient was seen earlier this week the lateral portion of the incision site was macerated it was wet the patient has been walking on his foot he supposed  to be maintaining a nonweightbearing status these were instructions he was given at the time of discharge from the hospital I also had given the patient a new prescription for Avelox because of his renal function this past Monday he subsequently did not get this filled due to cost and a new prescription was sent in for clindamycin which she had only started the day before he was admitted to the hospital.  On evaluation today the patient's right foot looks a lot better than the digital images that I reviewed from yesterday he has a lot less swelling redness the entire incision has dried up there is no active drainage there is no maceration noted clearly having the patient off of his foot has helped considerably the dressing is also off of the foot which is helping it to dry out where it had been previously macerated.  The emergency room did do a culture of the patient's right foot which is pending they also did blood cultures which are currently preliminary but do display signs of Gram-negative.  I reviewed the patient's CT there are no obvious signs of osteomyelitis noted on CT there is some pocketing and some small bubble formation and at this point I do not feel that the patient needs to have the area opened up and flushed out especially because he is showing signs of improvement.  I did paint the entire area with Betadine I am going to leave the dressing open I have instructed nursing to paint the area with Betadine once a day will keep the area uncovered however if the patient needs to get out of bed for anything he is to let the nursing no and they will put a light dressing on the area while the patient gets out of bed again I have advised him he can not bear weight he can not get the area wet and must keep it dry and clean.  I am seeing the patient for an unscheduled follow-up inpatient consultation due to his own noncompliance which has been significant since the time of surgery.  Patient was made aware he is  going to at least need several days of IV an antibiotic therapy especially with the presence of a Gram-negative in the blood culture likely transitioning him to oral antibiotics the patient's renal function has improved significantly since his admission yesterday likely the patient was becoming dehydrated which is certainly not helping his renal function.  Patient's WBCs at the time of admission were 18.79 this morning they have subsequently decreased to 10.13.  Wound care orders were put in for nursing I will follow up with the patient in the next 48 hours I do not anticipate him being discharged until at least the beginning of next week and I stressed with the patient the need for compliance.  Patient seen for unrelated evaluation and management today due to noncompliance following partial amputation right foot and sepsis.This note was created using [employer] voice recognition software that occasionally misinterpreted phrases or words.       Orion Donaldson DPM  Podiatry  [facility] - Intensive Care

## 2023-04-22 NOTE — ASSESSMENT & PLAN NOTE
We will encourage patient to take medications as prescribed and keep follow-up appointments at time of discharge

## 2023-04-22 NOTE — ASSESSMENT & PLAN NOTE
Patient's FSGs are controlled on current medication regimen.  Last A1c reviewed-   Lab Results   Component Value Date    HGBA1C 6.5 (H) 03/31/2023     Most recent fingerstick glucose reviewed-   Recent Labs   Lab 04/21/23  2046 04/22/23  0718 04/22/23  1100   POCTGLUCOSE 274* 210* 262*     Current correctional scale  Low  Maintain anti-hyperglycemic dose as follows-   Antihyperglycemics (From admission, onward)    Start     Stop Route Frequency Ordered    04/21/23 1726  insulin aspart U-100 pen 0-5 Units         -- SubQ Before meals & nightly PRN 04/21/23 1627        Hold Oral hypoglycemics while patient is in the hospital. Can restart home insulin when patient tolerating PO. Would start lower than home dose since patient with STERLING which will decrease renal clearance of insulin

## 2023-04-22 NOTE — ASSESSMENT & PLAN NOTE
Patient with acute kidney injury likely due to IVVD/dehydration STERLING is currently unclear. Labs reviewed- Renal function/electrolytes with Estimated Creatinine Clearance: 35.9 mL/min (A) (based on SCr of 1.8 mg/dL (H)). according to latest data. Monitor urine output and serial BMP and adjust therapy as needed. Avoid nephrotoxins and renally dose meds for GFR listed above.

## 2023-04-22 NOTE — NURSING
Administered Oxycodone 5mg PO per Emar. Began moaning in pain. Pain rates 10/10. Administered Morphine 5mg IVSP per Emar.

## 2023-04-22 NOTE — PLAN OF CARE
Problem: Adult Inpatient Plan of Care  Goal: Plan of Care Review  Outcome: Ongoing, Progressing  Goal: Patient-Specific Goal (Individualized)  Outcome: Ongoing, Progressing  Goal: Absence of Hospital-Acquired Illness or Injury  Outcome: Ongoing, Progressing  Goal: Optimal Comfort and Wellbeing  Outcome: Ongoing, Progressing  Goal: Readiness for Transition of Care  Outcome: Ongoing, Progressing     Problem: Diabetes Comorbidity  Goal: Blood Glucose Level Within Targeted Range  Outcome: Ongoing, Progressing     Problem: Adjustment to Illness (Sepsis/Septic Shock)  Goal: Optimal Coping  Outcome: Ongoing, Progressing     Problem: Glycemic Control Impaired (Sepsis/Septic Shock)  Goal: Blood Glucose Level Within Desired Range  Outcome: Ongoing, Progressing     Problem: Infection Progression (Sepsis/Septic Shock)  Goal: Absence of Infection Signs and Symptoms  Outcome: Ongoing, Progressing     Problem: Nutrition Impaired (Sepsis/Septic Shock)  Goal: Optimal Nutrition Intake  Outcome: Ongoing, Progressing     Problem: Fluid and Electrolyte Imbalance (Acute Kidney Injury/Impairment)  Goal: Fluid and Electrolyte Balance  Outcome: Ongoing, Progressing     Problem: Oral Intake Inadequate (Acute Kidney Injury/Impairment)  Goal: Optimal Nutrition Intake  Outcome: Ongoing, Progressing     Problem: Renal Function Impairment (Acute Kidney Injury/Impairment)  Goal: Effective Renal Function  Outcome: Ongoing, Progressing     Problem: Impaired Wound Healing  Goal: Optimal Wound Healing  Outcome: Ongoing, Progressing     Problem: Fall Injury Risk  Goal: Absence of Fall and Fall-Related Injury  Outcome: Ongoing, Progressing

## 2023-04-22 NOTE — NURSING
Received critical result from lab regarding positive blood culture for gram neg rods. MD notified. No new orders at this time. Patient remains afebrile VSS. No SOB or respiratory distress. No diaphoresis or chills. All patient needs met and anticipated.

## 2023-04-22 NOTE — ASSESSMENT & PLAN NOTE
This patient does have evidence of infective focus  My overall impression is sepsis.  Source: Skin and Soft Tissue (location Right foot surgical site)  Antibiotics given-   Antibiotics (72h ago, onward)    Start     Stop Route Frequency Ordered    04/22/23 0900  mupirocin 2 % ointment         04/27 0859 Nasl 2 times daily 04/22/23 0726    04/21/23 1730  ciprofloxacin (CIPRO)400mg/200ml D5W IVPB 400 mg         -- IV Every 24 hours (non-standard times) 04/21/23 1616    04/21/23 1730  clindamycin in D5W 600 mg/50 mL IVPB 600 mg         -- IV Every 8 hours (non-standard times) 04/21/23 1616        Latest lactate reviewed-  Recent Labs   Lab 04/21/23  1448 04/21/23  1825   LACTATE 1.7 0.8     Organ dysfunction indicated by Acute kidney injury    Fluid challenge Ideal Body Weight- The patient's ideal body weight is Ideal body weight: 68.4 kg (150 lb 12.7 oz) which will be used to calculate fluid bolus of 30 ml/kg for treatment of septic shock.      Post- resuscitation assessment No - Post resuscitation assessment not needed       Will Not start Pressors- Levophed for MAP of 65  Source control achieved by: IV abx, Podiatry consulted- to see patient in am  CT foot w/o contrast ordered    Microbiology Results (last 7 days)     Procedure Component Value Units Date/Time    Rapid Organism ID by PCR (from Blood culture) [[id number]]  (Abnormal) Collected: 04/21/23 1449    Order Status: Completed Updated: 04/22/23 1026     Enterococcus faecalis Not Detected     Enterococcus faecium Not Detected     Listeria Monocytogenes Not Detected     Staphylococcus spp. Not Detected     Staphylococcus aureus Not Detected     Staphylococcus epidermidis Not Detected     Staphylococcus lugdunensis Not Detected     Streptococcus species Not Detected     Streptococcus agalactiae Not Detected     Streptococcus pneumoniae Not Detected     Streptococcus pyogenes Not Detected     Acinetobacter calcoaceticus/baumannii complex Not Detected      Bacteroides fragilis Not Detected     Enterobacerales See species for ID     Enterobacter cloacae complex Not Detected     Escherichia Detected     Klebsiella aerogenes Not Detected     Klebsiella oxytoca Not Detected     Klebsiella pneumoniae group Not Detected     Proteus Not Detected     Salmonella sp Not Detected     Serratia marcescens Not Detected     Haemophilus influenzae Not Detected     Neisseria meningtidis Not Detected     Pseudomonas aeruginosa Not Detected     Stenotrophomonas maltophilia Not Detected     Candida albicans Not Detected     Candida auris Not Detected     Candida glabrata Not Detected     Candida krusei Not Detected     Candida parapsilosis Not Detected     Candida tropicalis Not Detected     Cryptococcus neoformans/gattii Not Detected     CTX-M (ESBL ) Not Detected     IMP (Carbapenem resistant) Not Detected     KPC resistance gene (Carbapenem resistant) Not Detected     mcr-1  Not Detected     mec A/C  Not Detected     mec A/C and MREJ (MRSA) gene Not Detected     NDM (Carbapenem resistant) Not Detected     OXA-48-like (Carbapenem resistant) Not Detected     van A/B (VRE gene) Not Detected     VIM (Carbapenem resistant) Not Detected    Narrative:      Specimen # 1    Blood culture [[id number]] Collected: 04/21/23 1449    Order Status: Completed Specimen: Blood from Peripheral, Antecubital, Right Updated: 04/22/23 0859     Blood Culture, Routine Gram stain janice bottle: Gram negative rods      Results called to and read back by: Li Julian LPN 04/22/2023  08:58    Narrative:      Specimen # 1    Aerobic culture [[id number]] Collected: 04/21/23 1622    Order Status: Sent Specimen: Wound from Foot, Right Updated: 04/22/23 0608    Blood culture [[id number]] Collected: 04/21/23 1544    Order Status: Completed Specimen: Blood Updated: 04/22/23 0515     Blood Culture, Routine No Growth to date    Narrative:      Specimen # 2    Culture, Anaerobe [[id number]] Collected: 04/21/23 1623    Order  Status: Sent Specimen: Wound from Foot, Right Updated: 04/21/23 2115    Influenza A & B by Molecular [[id number]] Collected: 04/21/23 1623    Order Status: Completed Specimen: Nasopharyngeal Swab Updated: 04/21/23 1648     Influenza A, Molecular Negative     Influenza B, Molecular Negative     Flu A & B Source Nasal Swab        First blood culture bottle shows Gram-negative rods  2nd blood culture bottle shows no growth  With continuing IV antibiotics for now

## 2023-04-22 NOTE — PROGRESS NOTES
[facility] Medicine  Progress Note    Patient Name: Juan Pablo Chang  MRN: [id number]  Patient Class: IP- Inpatient   Admission Date: 4/21/2023  Length of Stay: 1 days  Attending Physician: Devin Baker MD  Primary Care Provider: EDNA Bulter        Subjective:     Principal Problem:Sepsis        HPI:  61 y/o male with PMH of T2DM, neuropathy, HTN, HFpEF, recent right TMA due to abscess and possible early osteomyelitis who presents today with fever and redness to right foot. Patient was recently admitted 3/31-4/7 for right foot abscess and early osteo. He underwent right TMA with podiatry. Wound cultures grew staph sensitive to moxifloxacin. He was discharged to SNF but only stayed 3 days. It is unclear if he has been taking abx. He states for past 2-3 days his appetite has been decreased. He has not noticed any pain in the foot or drainage. No NV. No cough, SOB, Weakness. Brother brought patient in today because he was having fever.      Overview/Hospital Course:  No notes on file    Interval History:  He is no longer having any fever and has little or no pain.  He is aware that he may need amputation of his leg .  Blood culture and 1 bottle is growing Gram-negative rods but other bottle shows no growth.  He still received IV antibiotics.    Review of Systems   Constitutional: Negative.    HENT: Negative.     Eyes: Negative.    Respiratory: Negative.     Cardiovascular: Negative.    Gastrointestinal: Negative.    Endocrine: Negative.    Genitourinary: Negative.    Musculoskeletal: Negative.    Skin:  Positive for color change. Wound: right foot.  Allergic/Immunologic: Negative.    Neurological: Negative.    Hematological: Negative.    Psychiatric/Behavioral: Negative.     Objective:     Vital Signs (Most Recent):  Temp: 100.2 °F (37.9 °C) (04/22/23 1100)  Pulse: 97 (04/22/23 1100)  Resp: 19 (04/22/23 1100)  BP: (!) 158/71 (04/22/23 1100)  SpO2: 99 % (04/22/23 1100)   Vital Signs  (24h Range):  Temp:  [96.2 °F (35.7 °C)-102.6 °F (39.2 °C)] 100.2 °F (37.9 °C)  Pulse:  [] 97  Resp:  [17-28] 19  SpO2:  [96 %-100 %] 99 %  BP: (109-158)/(56-72) 158/71     Weight: 61.3 kg (135 lb 2.3 oz)  Body mass index is 20.55 kg/m².    Intake/Output Summary (Last 24 hours) at 4/22/2023 1104  Last data filed at 4/22/2023 0850  Gross per 24 hour   Intake 2030 ml   Output 450 ml   Net 1580 ml      Physical Exam  Constitutional:       Appearance: Normal appearance.   HENT:      Head: Normocephalic and atraumatic.      Right Ear: External ear normal.      Left Ear: External ear normal.      Nose: Nose normal.      Mouth/Throat:      Mouth: Mucous membranes are moist.   Eyes:      Extraocular Movements: Extraocular movements intact.   Cardiovascular:      Rate and Rhythm: Normal rate and regular rhythm.      Comments: Trace pulses in lower extremities  Pulmonary:      Effort: Pulmonary effort is normal.      Breath sounds: Normal breath sounds.   Abdominal:      General: Abdomen is flat.      Palpations: Abdomen is soft.   Musculoskeletal:         General: Swelling (right foot) and deformity (midfoot amputation) present.      Cervical back: Normal range of motion and neck supple.   Skin:     Capillary Refill: Capillary refill takes less than 2 seconds.      Findings: Lesion (Sutures in place) present.   Neurological:      General: No focal deficit present.      Mental Status: He is alert and oriented to person, place, and time. Mental status is at baseline.   Psychiatric:         Mood and Affect: Mood normal.         Behavior: Behavior normal.         Thought Content: Thought content normal.         Judgment: Judgment normal.       Significant Labs: All pertinent labs within the past 24 hours have been reviewed.  Blood Culture:   Recent Labs   Lab 04/21/23  1449 04/21/23  1544   LABBLOO Gram stain janice bottle: Gram negative rods  Results called to and read back by: Li Julian LPN 04/22/2023  08:58 No Growth to  date       Significant Imaging: I have reviewed all pertinent imaging results/findings within the past 24 hours.      Assessment/Plan:      * Sepsis  This patient does have evidence of infective focus  My overall impression is sepsis.  Source: Skin and Soft Tissue (location Right foot surgical site)  Antibiotics given-   Antibiotics (72h ago, onward)    Start     Stop Route Frequency Ordered    04/22/23 0900  mupirocin 2 % ointment         04/27 0859 Nasl 2 times daily 04/22/23 0726    04/21/23 1730  ciprofloxacin (CIPRO)400mg/200ml D5W IVPB 400 mg         -- IV Every 24 hours (non-standard times) 04/21/23 1616    04/21/23 1730  clindamycin in D5W 600 mg/50 mL IVPB 600 mg         -- IV Every 8 hours (non-standard times) 04/21/23 1616        Latest lactate reviewed-  Recent Labs   Lab 04/21/23  1448 04/21/23  1825   LACTATE 1.7 0.8     Organ dysfunction indicated by Acute kidney injury    Fluid challenge Ideal Body Weight- The patient's ideal body weight is Ideal body weight: 68.4 kg (150 lb 12.7 oz) which will be used to calculate fluid bolus of 30 ml/kg for treatment of septic shock.      Post- resuscitation assessment No - Post resuscitation assessment not needed       Will Not start Pressors- Levophed for MAP of 65  Source control achieved by: IV abx, Podiatry consulted- to see patient in am  CT foot w/o contrast ordered    Microbiology Results (last 7 days)     Procedure Component Value Units Date/Time    Rapid Organism ID by PCR (from Blood culture) [[id number]]  (Abnormal) Collected: 04/21/23 1449    Order Status: Completed Updated: 04/22/23 1026     Enterococcus faecalis Not Detected     Enterococcus faecium Not Detected     Listeria Monocytogenes Not Detected     Staphylococcus spp. Not Detected     Staphylococcus aureus Not Detected     Staphylococcus epidermidis Not Detected     Staphylococcus lugdunensis Not Detected     Streptococcus species Not Detected     Streptococcus agalactiae Not Detected      Streptococcus pneumoniae Not Detected     Streptococcus pyogenes Not Detected     Acinetobacter calcoaceticus/baumannii complex Not Detected     Bacteroides fragilis Not Detected     Enterobacerales See species for ID     Enterobacter cloacae complex Not Detected     Escherichia Detected     Klebsiella aerogenes Not Detected     Klebsiella oxytoca Not Detected     Klebsiella pneumoniae group Not Detected     Proteus Not Detected     Salmonella sp Not Detected     Serratia marcescens Not Detected     Haemophilus influenzae Not Detected     Neisseria meningtidis Not Detected     Pseudomonas aeruginosa Not Detected     Stenotrophomonas maltophilia Not Detected     Candida albicans Not Detected     Candida auris Not Detected     Candida glabrata Not Detected     Candida krusei Not Detected     Candida parapsilosis Not Detected     Candida tropicalis Not Detected     Cryptococcus neoformans/gattii Not Detected     CTX-M (ESBL ) Not Detected     IMP (Carbapenem resistant) Not Detected     KPC resistance gene (Carbapenem resistant) Not Detected     mcr-1  Not Detected     mec A/C  Not Detected     mec A/C and MREJ (MRSA) gene Not Detected     NDM (Carbapenem resistant) Not Detected     OXA-48-like (Carbapenem resistant) Not Detected     van A/B (VRE gene) Not Detected     VIM (Carbapenem resistant) Not Detected    Narrative:      Specimen # 1    Blood culture [[id number]] Collected: 04/21/23 1449    Order Status: Completed Specimen: Blood from Peripheral, Antecubital, Right Updated: 04/22/23 0859     Blood Culture, Routine Gram stain janice bottle: Gram negative rods      Results called to and read back by: Li Julian LPN 04/22/2023  08:58    Narrative:      Specimen # 1    Aerobic culture [[id number]] Collected: 04/21/23 1622    Order Status: Sent Specimen: Wound from Foot, Right Updated: 04/22/23 0608    Blood culture [[id number]] Collected: 04/21/23 1544    Order Status: Completed Specimen: Blood Updated: 04/22/23  0515     Blood Culture, Routine No Growth to date    Narrative:      Specimen # 2    Culture, Anaerobe [[id number]] Collected: 04/21/23 1623    Order Status: Sent Specimen: Wound from Foot, Right Updated: 04/21/23 2115    Influenza A & B by Molecular [[id number]] Collected: 04/21/23 1623    Order Status: Completed Specimen: Nasopharyngeal Swab Updated: 04/21/23 1648     Influenza A, Molecular Negative     Influenza B, Molecular Negative     Flu A & B Source Nasal Swab        First blood culture bottle shows Gram-negative rods  2nd blood culture bottle shows no growth  With continuing IV antibiotics for now    Noncompliance with treatment  We will encourage patient to take medications as prescribed and keep follow-up appointments at time of discharge      Hyperkalemia  Mild hyperkalemia- ECG w/o peaked T waves- Holding on calcium gluconate and shifting for now  Repeat BMP after patient volume resuscitated  Hold medications that can increase potassium      Chronic diastolic heart failure  Does not appear to be in acute exacerbation  Will hold further IVF after 30cc/kg resuscitation for sepsis  Hold home lasix with STERLING      Primary hypertension  Hold home lisinopril in setting of STERLING      Type 2 diabetes mellitus with hypoglycemia without coma, without long-term current use of insulin  Patient's FSGs are controlled on current medication regimen.  Last A1c reviewed-   Lab Results   Component Value Date    HGBA1C 6.5 (H) 03/31/2023     Most recent fingerstick glucose reviewed-   Recent Labs   Lab 04/21/23  2046 04/22/23  0718 04/22/23  1100   POCTGLUCOSE 274* 210* 262*     Current correctional scale  Low  Maintain anti-hyperglycemic dose as follows-   Antihyperglycemics (From admission, onward)    Start     Stop Route Frequency Ordered    04/21/23 1726  insulin aspart U-100 pen 0-5 Units         -- SubQ Before meals & nightly PRN 04/21/23 1627        Hold Oral hypoglycemics while patient is in the hospital. Can restart  home insulin when patient tolerating PO. Would start lower than home dose since patient with STERLING which will decrease renal clearance of insulin    STERLING (acute kidney injury)  Patient with acute kidney injury likely due to IVVD/dehydration STERLING is currently unclear. Labs reviewed- Renal function/electrolytes with Estimated Creatinine Clearance: 35.9 mL/min (A) (based on SCr of 1.8 mg/dL (H)). according to latest data. Monitor urine output and serial BMP and adjust therapy as needed. Avoid nephrotoxins and renally dose meds for GFR listed above.         VTE Risk Mitigation (From admission, onward)         Ordered     heparin (porcine) injection 5,000 Units  Every 8 hours         04/21/23 1625     IP VTE HIGH RISK PATIENT  Once         04/21/23 1625     Place sequential compression device  Until discontinued         04/21/23 1625                Discharge Planning   MARION:      Code Status: Full Code   Is the patient medically ready for discharge?:     Reason for patient still in hospital (select all that apply): Treatment  Discharge Plan A: Home with family                  Alvino Wang MD  Department of [facility] Medicine   [facility] - Intensive Care

## 2023-04-23 LAB
ALBUMIN SERPL BCP-MCNC: 2.5 G/DL (ref 3.5–5.2)
ALP SERPL-CCNC: 54 U/L (ref 55–135)
ALT SERPL W/O P-5'-P-CCNC: 29 U/L (ref 10–44)
ANION GAP SERPL CALC-SCNC: 11 MMOL/L (ref 8–16)
AST SERPL-CCNC: 41 U/L (ref 10–40)
BASOPHILS # BLD AUTO: 0.02 K/UL (ref 0–0.2)
BASOPHILS NFR BLD: 0.3 % (ref 0–1.9)
BILIRUB SERPL-MCNC: 0.3 MG/DL (ref 0.1–1)
BUN SERPL-MCNC: 21 MG/DL (ref 8–23)
CALCIUM SERPL-MCNC: 8.6 MG/DL (ref 8.7–10.5)
CHLORIDE SERPL-SCNC: 105 MMOL/L (ref 95–110)
CO2 SERPL-SCNC: 20 MMOL/L (ref 23–29)
CREAT SERPL-MCNC: 1.6 MG/DL (ref 0.5–1.4)
DIFFERENTIAL METHOD: ABNORMAL
EOSINOPHIL # BLD AUTO: 0 K/UL (ref 0–0.5)
EOSINOPHIL NFR BLD: 0.5 % (ref 0–8)
ERYTHROCYTE [DISTWIDTH] IN BLOOD BY AUTOMATED COUNT: 13.8 % (ref 11.5–14.5)
EST. GFR  (NO RACE VARIABLE): 47.8 ML/MIN/1.73 M^2
GLUCOSE SERPL-MCNC: 158 MG/DL (ref 70–110)
HCT VFR BLD AUTO: 27.3 % (ref 40–54)
HGB BLD-MCNC: 8.9 G/DL (ref 14–18)
IMM GRANULOCYTES # BLD AUTO: 0.02 K/UL (ref 0–0.04)
IMM GRANULOCYTES NFR BLD AUTO: 0.3 % (ref 0–0.5)
LYMPHOCYTES # BLD AUTO: 0.5 K/UL (ref 1–4.8)
LYMPHOCYTES NFR BLD: 8.9 % (ref 18–48)
MCH RBC QN AUTO: 28.3 PG (ref 27–31)
MCHC RBC AUTO-ENTMCNC: 32.6 G/DL (ref 32–36)
MCV RBC AUTO: 87 FL (ref 82–98)
MONOCYTES # BLD AUTO: 0.5 K/UL (ref 0.3–1)
MONOCYTES NFR BLD: 8.4 % (ref 4–15)
NEUTROPHILS # BLD AUTO: 4.9 K/UL (ref 1.8–7.7)
NEUTROPHILS NFR BLD: 81.6 % (ref 38–73)
NRBC BLD-RTO: 0 /100 WBC
PLATELET # BLD AUTO: 226 K/UL (ref 150–450)
PMV BLD AUTO: 11.3 FL (ref 9.2–12.9)
POCT GLUCOSE: 157 MG/DL (ref 70–110)
POCT GLUCOSE: 200 MG/DL (ref 70–110)
POCT GLUCOSE: 212 MG/DL (ref 70–110)
POCT GLUCOSE: 236 MG/DL (ref 70–110)
POTASSIUM SERPL-SCNC: 4.3 MMOL/L (ref 3.5–5.1)
PROT SERPL-MCNC: 6.5 G/DL (ref 6–8.4)
RBC # BLD AUTO: 3.14 M/UL (ref 4.6–6.2)
SODIUM SERPL-SCNC: 136 MMOL/L (ref 136–145)
WBC # BLD AUTO: 5.97 K/UL (ref 3.9–12.7)

## 2023-04-23 PROCEDURE — 99232 SBSQ HOSP IP/OBS MODERATE 35: CPT | Mod: ,,, | Performed by: HOSPITALIST

## 2023-04-23 PROCEDURE — 99232 PR SUBSEQUENT HOSPITAL CARE,LEVL II: ICD-10-PCS | Mod: ,,, | Performed by: HOSPITALIST

## 2023-04-23 PROCEDURE — 63600175 PHARM REV CODE 636 W HCPCS: Performed by: STUDENT IN AN ORGANIZED HEALTH CARE EDUCATION/TRAINING PROGRAM

## 2023-04-23 PROCEDURE — 63600175 PHARM REV CODE 636 W HCPCS: Performed by: HOSPITALIST

## 2023-04-23 PROCEDURE — 85025 COMPLETE CBC W/AUTO DIFF WBC: CPT | Performed by: STUDENT IN AN ORGANIZED HEALTH CARE EDUCATION/TRAINING PROGRAM

## 2023-04-23 PROCEDURE — 25000003 PHARM REV CODE 250: Performed by: STUDENT IN AN ORGANIZED HEALTH CARE EDUCATION/TRAINING PROGRAM

## 2023-04-23 PROCEDURE — 25000003 PHARM REV CODE 250: Performed by: HOSPITALIST

## 2023-04-23 PROCEDURE — 80053 COMPREHEN METABOLIC PANEL: CPT | Performed by: STUDENT IN AN ORGANIZED HEALTH CARE EDUCATION/TRAINING PROGRAM

## 2023-04-23 PROCEDURE — 25000003 PHARM REV CODE 250: Performed by: INTERNAL MEDICINE

## 2023-04-23 PROCEDURE — 11000001 HC ACUTE MED/SURG PRIVATE ROOM

## 2023-04-23 RX ORDER — POLYETHYLENE GLYCOL 3350 17 G/17G
17 POWDER, FOR SOLUTION ORAL DAILY
Status: DISCONTINUED | OUTPATIENT
Start: 2023-04-23 | End: 2023-04-24 | Stop reason: HOSPADM

## 2023-04-23 RX ORDER — AMOXICILLIN 250 MG
1 CAPSULE ORAL 2 TIMES DAILY
Status: DISCONTINUED | OUTPATIENT
Start: 2023-04-23 | End: 2023-04-24 | Stop reason: HOSPADM

## 2023-04-23 RX ORDER — CIPROFLOXACIN 2 MG/ML
400 INJECTION, SOLUTION INTRAVENOUS
Status: DISCONTINUED | OUTPATIENT
Start: 2023-04-23 | End: 2023-04-24

## 2023-04-23 RX ADMIN — HEPARIN SODIUM 5000 UNITS: 5000 INJECTION, SOLUTION INTRAVENOUS; SUBCUTANEOUS at 09:04

## 2023-04-23 RX ADMIN — MORPHINE SULFATE 1 MG: 2 INJECTION, SOLUTION INTRAMUSCULAR; INTRAVENOUS at 07:04

## 2023-04-23 RX ADMIN — INSULIN ASPART 1 UNITS: 100 INJECTION, SOLUTION INTRAVENOUS; SUBCUTANEOUS at 04:04

## 2023-04-23 RX ADMIN — MUPIROCIN: 20 OINTMENT TOPICAL at 08:04

## 2023-04-23 RX ADMIN — SODIUM BICARBONATE 650 MG TABLET 650 MG: at 09:04

## 2023-04-23 RX ADMIN — MORPHINE SULFATE 1 MG: 2 INJECTION, SOLUTION INTRAMUSCULAR; INTRAVENOUS at 01:04

## 2023-04-23 RX ADMIN — OXYCODONE HYDROCHLORIDE AND ACETAMINOPHEN 1 TABLET: 5; 325 TABLET ORAL at 04:04

## 2023-04-23 RX ADMIN — GABAPENTIN 100 MG: 100 CAPSULE ORAL at 09:04

## 2023-04-23 RX ADMIN — SODIUM BICARBONATE 650 MG TABLET 650 MG: at 08:04

## 2023-04-23 RX ADMIN — DOCUSATE SODIUM 50MG AND SENNOSIDES 8.6MG 1 TABLET: 8.6; 5 TABLET, FILM COATED ORAL at 09:04

## 2023-04-23 RX ADMIN — GABAPENTIN 100 MG: 100 CAPSULE ORAL at 02:04

## 2023-04-23 RX ADMIN — CIPROFLOXACIN 400 MG: 2 INJECTION, SOLUTION INTRAVENOUS at 12:04

## 2023-04-23 RX ADMIN — CLINDAMYCIN IN 5 PERCENT DEXTROSE 600 MG: 12 INJECTION, SOLUTION INTRAVENOUS at 10:04

## 2023-04-23 RX ADMIN — CIPROFLOXACIN 400 MG: 2 INJECTION, SOLUTION INTRAVENOUS at 11:04

## 2023-04-23 RX ADMIN — OXYCODONE HYDROCHLORIDE AND ACETAMINOPHEN 1 TABLET: 5; 325 TABLET ORAL at 11:04

## 2023-04-23 RX ADMIN — CLINDAMYCIN IN 5 PERCENT DEXTROSE 600 MG: 12 INJECTION, SOLUTION INTRAVENOUS at 07:04

## 2023-04-23 RX ADMIN — HEPARIN SODIUM 5000 UNITS: 5000 INJECTION, SOLUTION INTRAVENOUS; SUBCUTANEOUS at 05:04

## 2023-04-23 RX ADMIN — CLINDAMYCIN IN 5 PERCENT DEXTROSE 600 MG: 12 INJECTION, SOLUTION INTRAVENOUS at 03:04

## 2023-04-23 RX ADMIN — HEPARIN SODIUM 5000 UNITS: 5000 INJECTION, SOLUTION INTRAVENOUS; SUBCUTANEOUS at 02:04

## 2023-04-23 RX ADMIN — GABAPENTIN 100 MG: 100 CAPSULE ORAL at 08:04

## 2023-04-23 NOTE — ASSESSMENT & PLAN NOTE
Mild hyperkalemia- ECG w/o peaked T waves- Holding on calcium gluconate and shifting for now  Repeat BMP after patient volume resuscitated  Hold medications that can increase potassium  Hyperkalemia is resolving

## 2023-04-23 NOTE — SUBJECTIVE & OBJECTIVE
Interval History:  He is having some pain in his right foot leg.  Pain is 5/10 on a pain intensity scale.  Denies fever, chills, nausea or vomiting.    Review of Systems   Constitutional: Negative.    HENT: Negative.     Eyes: Negative.    Respiratory: Negative.     Cardiovascular: Negative.    Gastrointestinal: Negative.    Endocrine: Negative.    Genitourinary: Negative.    Musculoskeletal: Negative.    Skin:  Wound: wound intact with sutures in place on right foot.   Allergic/Immunologic: Negative.    Neurological: Negative.    Hematological: Negative.    Psychiatric/Behavioral: Negative.     Objective:     Vital Signs (Most Recent):  Temp: 97.8 °F (36.6 °C) (04/23/23 0707)  Pulse: 81 (04/23/23 0707)  Resp: 15 (04/23/23 0707)  BP: (!) 126/58 (04/23/23 0707)  SpO2: 99 % (04/23/23 0707)   Vital Signs (24h Range):  Temp:  [97.8 °F (36.6 °C)-100.2 °F (37.9 °C)] 97.8 °F (36.6 °C)  Pulse:  [] 81  Resp:  [15-19] 15  SpO2:  [97 %-100 %] 99 %  BP: (123-163)/(58-73) 126/58     Weight: 61.3 kg (135 lb 2.3 oz)  Body mass index is 20.55 kg/m².    Intake/Output Summary (Last 24 hours) at 4/23/2023 1051  Last data filed at 4/23/2023 1010  Gross per 24 hour   Intake 921.92 ml   Output 2325 ml   Net -1403.08 ml      Physical Exam  Vitals and nursing note reviewed.   Constitutional:       Appearance: Normal appearance.   HENT:      Head: Normocephalic and atraumatic.      Nose: Nose normal.   Eyes:      Extraocular Movements: Extraocular movements intact.   Cardiovascular:      Rate and Rhythm: Normal rate and regular rhythm.      Pulses: Normal pulses.      Heart sounds: Normal heart sounds.   Pulmonary:      Effort: Pulmonary effort is normal.      Breath sounds: Normal breath sounds.   Abdominal:      General: Abdomen is flat. Bowel sounds are normal.      Palpations: Abdomen is soft.   Musculoskeletal:         General: Normal range of motion.      Cervical back: Normal range of motion and neck supple.   Skin:      Capillary Refill: Capillary refill takes 2 to 3 seconds.      Findings: Lesion (sutures in place right foot.  No drainage) present.   Neurological:      General: No focal deficit present.      Mental Status: He is alert and oriented to person, place, and time.   Psychiatric:         Mood and Affect: Mood normal.         Thought Content: Thought content normal.       Significant Labs: All pertinent labs within the past 24 hours have been reviewed.  Blood Culture:   Recent Labs   Lab 04/21/23  1449 04/21/23  1544   LABBLOO Gram stain janice bottle: Gram negative rods  Results called to and read back by: Li Julian LPN 04/22/2023  08:58  GRAM NEGATIVE PIEDAD  Identification and susceptibility pending  * No Growth to date  No Growth to date     CBC:   Recent Labs   Lab 04/21/23  1448 04/22/23  0251 04/23/23  0420   WBC 18.79* 10.12 5.97   HGB 10.9* 9.1* 8.9*   HCT 35.0* 28.3* 27.3*    239 226     CMP:   Recent Labs   Lab 04/21/23  1448 04/21/23  1825 04/22/23  0251 04/23/23  0420   * 136 135* 136   K 5.7* 5.1 5.1 4.3    107 107 105   CO2 20* 18* 18* 20*   * 148* 165* 158*   BUN 38* 36* 32* 21   CREATININE 2.5* 2.1* 1.8* 1.6*   CALCIUM 9.8 8.4* 8.5* 8.6*   PROT 8.1  --  6.5 6.5   ALBUMIN 3.2*  --  2.6* 2.5*   BILITOT 0.7  --  0.4 0.3   ALKPHOS 59  --  54* 54*   AST 25  --  28 41*   ALT 19  --  20 29   ANIONGAP 14 11 10 11       Significant Imaging: I have reviewed all pertinent imaging results/findings within the past 24 hours.  I have reviewed and interpreted all pertinent imaging results/findings within the past 24 hours.

## 2023-04-23 NOTE — PLAN OF CARE
Problem: Adult Inpatient Plan of Care  Goal: Plan of Care Review  4/22/2023 2159 by Ana Garcia RN  Outcome: Ongoing, Progressing  4/22/2023 2158 by Ana Garcia RN  Outcome: Ongoing, Progressing     Problem: Adult Inpatient Plan of Care  Goal: Patient-Specific Goal (Individualized)  4/22/2023 2159 by Ana Garcia, DENNISE  Outcome: Ongoing, Progressing  4/22/2023 2158 by Ana Garcia RN  Outcome: Ongoing, Progressing     Problem: Adult Inpatient Plan of Care  Goal: Absence of Hospital-Acquired Illness or Injury  4/22/2023 2159 by Ana Garcia RN  Outcome: Ongoing, Progressing  4/22/2023 2158 by Ana Garcia RN  Outcome: Ongoing, Progressing     Problem: Adult Inpatient Plan of Care  Goal: Optimal Comfort and Wellbeing  4/22/2023 2159 by Ana Garcia, DENNISE  Outcome: Ongoing, Progressing  4/22/2023 2158 by Ana Garcia RN  Outcome: Ongoing, Progressing     Problem: Diabetes Comorbidity  Goal: Blood Glucose Level Within Targeted Range  4/22/2023 2159 by Ana Garcia RN  Outcome: Ongoing, Progressing  4/22/2023 2158 by Ana Garcia RN  Outcome: Ongoing, Progressing     Problem: Glycemic Control Impaired (Sepsis/Septic Shock)  Goal: Blood Glucose Level Within Desired Range  4/22/2023 2159 by Ana Garcia, DENNISE  Outcome: Ongoing, Progressing  4/22/2023 2158 by Ana Garcia RN  Outcome: Ongoing, Progressing     Problem: Infection Progression (Sepsis/Septic Shock)  Goal: Absence of Infection Signs and Symptoms  4/22/2023 2159 by Ana Garcia RN  Outcome: Ongoing, Progressing  4/22/2023 2158 by Ana Garcia RN  Outcome: Ongoing, Progressing     Problem: Nutrition Impaired (Sepsis/Septic Shock)  Goal: Optimal Nutrition Intake  4/22/2023 2159 by Ana Garcia RN  Outcome: Ongoing, Progressing  4/22/2023 2158 by Ana Garcia RN  Outcome: Ongoing, Progressing     Problem: Pain Acute  Goal: Acceptable Pain Control and Functional  Ability  Outcome: Ongoing, Progressing     Problem: Fall Injury Risk  Goal: Absence of Fall and Fall-Related Injury  4/22/2023 2159 by Ana Garcia, DENNISE  Outcome: Ongoing, Progressing  4/22/2023 2158 by Ana Garcia, DENNISE  Outcome: Ongoing, Progressing     Problem: Impaired Wound Healing  Goal: Optimal Wound Healing  4/22/2023 2159 by Ana Garcia, DENNISE  Outcome: Ongoing, Progressing  4/22/2023 2158 by Ana Garcia, DENNISE  Outcome: Ongoing, Progressing     Problem: Renal Function Impairment (Acute Kidney Injury/Impairment)  Goal: Effective Renal Function  4/22/2023 2159 by Ana Garcia, DENNISE  Outcome: Ongoing, Progressing  4/22/2023 2158 by Ana Garcia, DENNISE  Outcome: Ongoing, Progressing

## 2023-04-23 NOTE — ASSESSMENT & PLAN NOTE
This patient does have evidence of infective focus  My overall impression is sepsis.  Source: Skin and Soft Tissue (location Right foot surgical site)  Antibiotics given-   Antibiotics (72h ago, onward)    Start     Stop Route Frequency Ordered    04/22/23 0900  mupirocin 2 % ointment         04/27 0859 Nasl 2 times daily 04/22/23 0726    04/21/23 1730  ciprofloxacin (CIPRO)400mg/200ml D5W IVPB 400 mg         -- IV Every 24 hours (non-standard times) 04/21/23 1616    04/21/23 1730  clindamycin in D5W 600 mg/50 mL IVPB 600 mg         -- IV Every 8 hours (non-standard times) 04/21/23 1616        Latest lactate reviewed-  Recent Labs   Lab 04/21/23  1448 04/21/23  1825   LACTATE 1.7 0.8     Organ dysfunction indicated by Acute kidney injury    Fluid challenge Ideal Body Weight- The patient's ideal body weight is Ideal body weight: 68.4 kg (150 lb 12.7 oz) which will be used to calculate fluid bolus of 30 ml/kg for treatment of septic shock.      Post- resuscitation assessment No - Post resuscitation assessment not needed       Will Not start Pressors- Levophed for MAP of 65  Source control achieved by: IV abx, Podiatry consulted- to see patient in am  CT foot w/o contrast ordered    Microbiology Results (last 7 days)     Procedure Component Value Units Date/Time    Culture, Anaerobe [[id number]] Collected: 04/21/23 1623    Order Status: Completed Specimen: Wound from Foot, Right Updated: 04/23/23 0818     Anaerobic Culture Culture in progress    Blood culture [[id number]]  (Abnormal) Collected: 04/21/23 1449    Order Status: Completed Specimen: Blood from Peripheral, Antecubital, Right Updated: 04/23/23 0720     Blood Culture, Routine Gram stain janice bottle: Gram negative rods      Results called to and read back by: Li Julian LPN 04/22/2023  08:58      GRAM NEGATIVE PIEDAD  Identification and susceptibility pending      Narrative:      Specimen # 1    Blood culture [[id number]] Collected: 04/21/23 1544    Order Status:  Completed Specimen: Blood Updated: 04/22/23 2312     Blood Culture, Routine No Growth to date      No Growth to date    Narrative:      Specimen # 2    Aerobic culture [[id number]] Collected: 04/21/23 1622    Order Status: Sent Specimen: Wound from Foot, Right Updated: 04/22/23 1654    Rapid Organism ID by PCR (from Blood culture) [[id number]]  (Abnormal) Collected: 04/21/23 1449    Order Status: Completed Updated: 04/22/23 1026     Enterococcus faecalis Not Detected     Enterococcus faecium Not Detected     Listeria Monocytogenes Not Detected     Staphylococcus spp. Not Detected     Staphylococcus aureus Not Detected     Staphylococcus epidermidis Not Detected     Staphylococcus lugdunensis Not Detected     Streptococcus species Not Detected     Streptococcus agalactiae Not Detected     Streptococcus pneumoniae Not Detected     Streptococcus pyogenes Not Detected     Acinetobacter calcoaceticus/baumannii complex Not Detected     Bacteroides fragilis Not Detected     Enterobacerales See species for ID     Enterobacter cloacae complex Not Detected     Escherichia Detected     Klebsiella aerogenes Not Detected     Klebsiella oxytoca Not Detected     Klebsiella pneumoniae group Not Detected     Proteus Not Detected     Salmonella sp Not Detected     Serratia marcescens Not Detected     Haemophilus influenzae Not Detected     Neisseria meningtidis Not Detected     Pseudomonas aeruginosa Not Detected     Stenotrophomonas maltophilia Not Detected     Candida albicans Not Detected     Candida auris Not Detected     Candida glabrata Not Detected     Candida krusei Not Detected     Candida parapsilosis Not Detected     Candida tropicalis Not Detected     Cryptococcus neoformans/gattii Not Detected     CTX-M (ESBL ) Not Detected     IMP (Carbapenem resistant) Not Detected     KPC resistance gene (Carbapenem resistant) Not Detected     mcr-1  Not Detected     mec A/C  Not Detected     mec A/C and MREJ (MRSA) gene Not  Detected     NDM (Carbapenem resistant) Not Detected     OXA-48-like (Carbapenem resistant) Not Detected     van A/B (VRE gene) Not Detected     VIM (Carbapenem resistant) Not Detected    Narrative:      Specimen # 1    Influenza A & B by Molecular [[id number]] Collected: 04/21/23 1623    Order Status: Completed Specimen: Nasopharyngeal Swab Updated: 04/21/23 1648     Influenza A, Molecular Negative     Influenza B, Molecular Negative     Flu A & B Source Nasal Swab        First blood culture bottle shows Gram-negative rods  2nd blood culture bottle shows no growth  With continuing IV antibiotics for now

## 2023-04-23 NOTE — ASSESSMENT & PLAN NOTE
Patient's FSGs are controlled on current medication regimen.  Last A1c reviewed-   Lab Results   Component Value Date    HGBA1C 6.5 (H) 03/31/2023     Most recent fingerstick glucose reviewed-   Recent Labs   Lab 04/22/23  1100 04/22/23  1558 04/22/23  2139 04/23/23  0708   POCTGLUCOSE 262* 189* 212* 157*     Current correctional scale  Low  Maintain anti-hyperglycemic dose as follows-   Antihyperglycemics (From admission, onward)    Start     Stop Route Frequency Ordered    04/21/23 1726  insulin aspart U-100 pen 0-5 Units         -- SubQ Before meals & nightly PRN 04/21/23 1627        Hold Oral hypoglycemics while patient is in the hospital. Can restart home insulin when patient tolerating PO. Would start lower than home dose since patient with STERLING which will decrease renal clearance of insulin

## 2023-04-23 NOTE — PROGRESS NOTES
Pharmacist Renal Dose Adjustment Note      Patient Data:    Vital Signs (Most Recent):  Temp: 97.8 °F (36.6 °C) (04/23/23 1110)  Pulse: 76 (04/23/23 1110)  Resp: 18 (04/23/23 1110)  BP: 138/65 (04/23/23 1110)  SpO2: 98 % (04/23/23 1110) Vital Signs (72h Range):  Temp:  [96.2 °F (35.7 °C)-102.6 °F (39.2 °C)]   Pulse:  []   Resp:  [15-28]   BP: (109-163)/(56-73)   SpO2:  [96 %-100 %]      Recent Labs   Lab 04/21/23  1825 04/22/23  0251 04/23/23  0420   CREATININE 2.1* 1.8* 1.6*     Serum creatinine: 1.6 mg/dL (H) 04/23/23 0420  Estimated creatinine clearance: 40.4 mL/min (A)    Ciprofloxacin 400mg every 24 hours has been renally adjusted per Dr. Wang to Ciprofloxacin 400mg every 12 hours due to improvement in CrCl.    Pharmacist's Name: Juan Manuel Preston, PharmD  Pharmacist's Extension: [phone]

## 2023-04-23 NOTE — ASSESSMENT & PLAN NOTE
We will encourage patient to take medications as prescribed and keep follow-up appointments at time of discharge  Again encouraged patient to take medications to prevent additional amputation

## 2023-04-23 NOTE — PROGRESS NOTES
[facility] Medicine  Progress Note    Patient Name: Juan Pablo Chang  MRN: [id number]  Patient Class: IP- Inpatient   Admission Date: 4/21/2023  Length of Stay: 2 days  Attending Physician: Devin Baker MD  Primary Care Provider: EDNA Butler        Subjective:     Principal Problem:Sepsis        HPI:  61 y/o male with PMH of T2DM, neuropathy, HTN, HFpEF, recent right TMA due to abscess and possible early osteomyelitis who presents today with fever and redness to right foot. Patient was recently admitted 3/31-4/7 for right foot abscess and early osteo. He underwent right TMA with podiatry. Wound cultures grew staph sensitive to moxifloxacin. He was discharged to SNF but only stayed 3 days. It is unclear if he has been taking abx. He states for past 2-3 days his appetite has been decreased. He has not noticed any pain in the foot or drainage. No NV. No cough, SOB, Weakness. Brother brought patient in today because he was having fever.      Overview/Hospital Course:  No notes on file    Interval History:  He is having some pain in his right foot leg.  Pain is 5/10 on a pain intensity scale.  Denies fever, chills, nausea or vomiting.    Review of Systems   Constitutional: Negative.    HENT: Negative.     Eyes: Negative.    Respiratory: Negative.     Cardiovascular: Negative.    Gastrointestinal: Negative.    Endocrine: Negative.    Genitourinary: Negative.    Musculoskeletal: Negative.    Skin:  Wound: wound intact with sutures in place on right foot.   Allergic/Immunologic: Negative.    Neurological: Negative.    Hematological: Negative.    Psychiatric/Behavioral: Negative.     Objective:     Vital Signs (Most Recent):  Temp: 97.8 °F (36.6 °C) (04/23/23 0707)  Pulse: 81 (04/23/23 0707)  Resp: 15 (04/23/23 0707)  BP: (!) 126/58 (04/23/23 0707)  SpO2: 99 % (04/23/23 0707)   Vital Signs (24h Range):  Temp:  [97.8 °F (36.6 °C)-100.2 °F (37.9 °C)] 97.8 °F (36.6 °C)  Pulse:  []  81  Resp:  [15-19] 15  SpO2:  [97 %-100 %] 99 %  BP: (123-163)/(58-73) 126/58     Weight: 61.3 kg (135 lb 2.3 oz)  Body mass index is 20.55 kg/m².    Intake/Output Summary (Last 24 hours) at 4/23/2023 1051  Last data filed at 4/23/2023 1010  Gross per 24 hour   Intake 921.92 ml   Output 2325 ml   Net -1403.08 ml      Physical Exam  Vitals and nursing note reviewed.   Constitutional:       Appearance: Normal appearance.   HENT:      Head: Normocephalic and atraumatic.      Nose: Nose normal.   Eyes:      Extraocular Movements: Extraocular movements intact.   Cardiovascular:      Rate and Rhythm: Normal rate and regular rhythm.      Pulses: Normal pulses.      Heart sounds: Normal heart sounds.   Pulmonary:      Effort: Pulmonary effort is normal.      Breath sounds: Normal breath sounds.   Abdominal:      General: Abdomen is flat. Bowel sounds are normal.      Palpations: Abdomen is soft.   Musculoskeletal:         General: Normal range of motion.      Cervical back: Normal range of motion and neck supple.   Skin:     Capillary Refill: Capillary refill takes 2 to 3 seconds.      Findings: Lesion (sutures in place right foot.  No drainage) present.   Neurological:      General: No focal deficit present.      Mental Status: He is alert and oriented to person, place, and time.   Psychiatric:         Mood and Affect: Mood normal.         Thought Content: Thought content normal.       Significant Labs: All pertinent labs within the past 24 hours have been reviewed.  Blood Culture:   Recent Labs   Lab 04/21/23  1449 04/21/23  1544   LABBLOO Gram stain janice bottle: Gram negative rods  Results called to and read back by: Li Julian LPN 04/22/2023  08:58  GRAM NEGATIVE PIEDAD  Identification and susceptibility pending  * No Growth to date  No Growth to date     CBC:   Recent Labs   Lab 04/21/23  1448 04/22/23  0251 04/23/23  0420   WBC 18.79* 10.12 5.97   HGB 10.9* 9.1* 8.9*   HCT 35.0* 28.3* 27.3*    239 226     CMP:    Recent Labs   Lab 04/21/23  1448 04/21/23  1825 04/22/23  0251 04/23/23  0420   * 136 135* 136   K 5.7* 5.1 5.1 4.3    107 107 105   CO2 20* 18* 18* 20*   * 148* 165* 158*   BUN 38* 36* 32* 21   CREATININE 2.5* 2.1* 1.8* 1.6*   CALCIUM 9.8 8.4* 8.5* 8.6*   PROT 8.1  --  6.5 6.5   ALBUMIN 3.2*  --  2.6* 2.5*   BILITOT 0.7  --  0.4 0.3   ALKPHOS 59  --  54* 54*   AST 25  --  28 41*   ALT 19  --  20 29   ANIONGAP 14 11 10 11       Significant Imaging: I have reviewed all pertinent imaging results/findings within the past 24 hours.  I have reviewed and interpreted all pertinent imaging results/findings within the past 24 hours.      Assessment/Plan:      * Sepsis  This patient does have evidence of infective focus  My overall impression is sepsis.  Source: Skin and Soft Tissue (location Right foot surgical site)  Antibiotics given-   Antibiotics (72h ago, onward)    Start     Stop Route Frequency Ordered    04/22/23 0900  mupirocin 2 % ointment         04/27 0859 Nasl 2 times daily 04/22/23 0726    04/21/23 1730  ciprofloxacin (CIPRO)400mg/200ml D5W IVPB 400 mg         -- IV Every 24 hours (non-standard times) 04/21/23 1616    04/21/23 1730  clindamycin in D5W 600 mg/50 mL IVPB 600 mg         -- IV Every 8 hours (non-standard times) 04/21/23 1616        Latest lactate reviewed-  Recent Labs   Lab 04/21/23  1448 04/21/23  1825   LACTATE 1.7 0.8     Organ dysfunction indicated by Acute kidney injury    Fluid challenge Ideal Body Weight- The patient's ideal body weight is Ideal body weight: 68.4 kg (150 lb 12.7 oz) which will be used to calculate fluid bolus of 30 ml/kg for treatment of septic shock.      Post- resuscitation assessment No - Post resuscitation assessment not needed       Will Not start Pressors- Levophed for MAP of 65  Source control achieved by: IV abx, Podiatry consulted- to see patient in am  CT foot w/o contrast ordered    Microbiology Results (last 7 days)     Procedure Component  Value Units Date/Time    Culture, Anaerobe [[id number]] Collected: 04/21/23 1623    Order Status: Completed Specimen: Wound from Foot, Right Updated: 04/23/23 0818     Anaerobic Culture Culture in progress    Blood culture [[id number]]  (Abnormal) Collected: 04/21/23 1449    Order Status: Completed Specimen: Blood from Peripheral, Antecubital, Right Updated: 04/23/23 0720     Blood Culture, Routine Gram stain janice bottle: Gram negative rods      Results called to and read back by: Li Julian LPN 04/22/2023  08:58      GRAM NEGATIVE PIEDAD  Identification and susceptibility pending      Narrative:      Specimen # 1    Blood culture [[id number]] Collected: 04/21/23 1544    Order Status: Completed Specimen: Blood Updated: 04/22/23 2312     Blood Culture, Routine No Growth to date      No Growth to date    Narrative:      Specimen # 2    Aerobic culture [[id number]] Collected: 04/21/23 1622    Order Status: Sent Specimen: Wound from Foot, Right Updated: 04/22/23 1654    Rapid Organism ID by PCR (from Blood culture) [[id number]]  (Abnormal) Collected: 04/21/23 1449    Order Status: Completed Updated: 04/22/23 1026     Enterococcus faecalis Not Detected     Enterococcus faecium Not Detected     Listeria Monocytogenes Not Detected     Staphylococcus spp. Not Detected     Staphylococcus aureus Not Detected     Staphylococcus epidermidis Not Detected     Staphylococcus lugdunensis Not Detected     Streptococcus species Not Detected     Streptococcus agalactiae Not Detected     Streptococcus pneumoniae Not Detected     Streptococcus pyogenes Not Detected     Acinetobacter calcoaceticus/baumannii complex Not Detected     Bacteroides fragilis Not Detected     Enterobacerales See species for ID     Enterobacter cloacae complex Not Detected     Escherichia Detected     Klebsiella aerogenes Not Detected     Klebsiella oxytoca Not Detected     Klebsiella pneumoniae group Not Detected     Proteus Not Detected     Salmonella sp Not Detected      Serratia marcescens Not Detected     Haemophilus influenzae Not Detected     Neisseria meningtidis Not Detected     Pseudomonas aeruginosa Not Detected     Stenotrophomonas maltophilia Not Detected     Candida albicans Not Detected     Candida auris Not Detected     Candida glabrata Not Detected     Candida krusei Not Detected     Candida parapsilosis Not Detected     Candida tropicalis Not Detected     Cryptococcus neoformans/gattii Not Detected     CTX-M (ESBL ) Not Detected     IMP (Carbapenem resistant) Not Detected     KPC resistance gene (Carbapenem resistant) Not Detected     mcr-1  Not Detected     mec A/C  Not Detected     mec A/C and MREJ (MRSA) gene Not Detected     NDM (Carbapenem resistant) Not Detected     OXA-48-like (Carbapenem resistant) Not Detected     van A/B (VRE gene) Not Detected     VIM (Carbapenem resistant) Not Detected    Narrative:      Specimen # 1    Influenza A & B by Molecular [[id number]] Collected: 04/21/23 1623    Order Status: Completed Specimen: Nasopharyngeal Swab Updated: 04/21/23 1648     Influenza A, Molecular Negative     Influenza B, Molecular Negative     Flu A & B Source Nasal Swab        First blood culture bottle shows Gram-negative rods  2nd blood culture bottle shows no growth  With continuing IV antibiotics for now    Noncompliance with treatment  We will encourage patient to take medications as prescribed and keep follow-up appointments at time of discharge  Again encouraged patient to take medications to prevent additional amputation      Hyperkalemia  Mild hyperkalemia- ECG w/o peaked T waves- Holding on calcium gluconate and shifting for now  Repeat BMP after patient volume resuscitated  Hold medications that can increase potassium  Hyperkalemia is resolving      Chronic diastolic heart failure  Does not appear to be in acute exacerbation  Will hold further IVF after 30cc/kg resuscitation for sepsis  Hold home lasix with STERLING      Primary  hypertension  Hold home lisinopril in setting of STERLING      Type 2 diabetes mellitus with hypoglycemia without coma, without long-term current use of insulin  Patient's FSGs are controlled on current medication regimen.  Last A1c reviewed-   Lab Results   Component Value Date    HGBA1C 6.5 (H) 03/31/2023     Most recent fingerstick glucose reviewed-   Recent Labs   Lab 04/22/23  1100 04/22/23  1558 04/22/23  2139 04/23/23  0708   POCTGLUCOSE 262* 189* 212* 157*     Current correctional scale  Low  Maintain anti-hyperglycemic dose as follows-   Antihyperglycemics (From admission, onward)    Start     Stop Route Frequency Ordered    04/21/23 1726  insulin aspart U-100 pen 0-5 Units         -- SubQ Before meals & nightly PRN 04/21/23 1627        Hold Oral hypoglycemics while patient is in the hospital. Can restart home insulin when patient tolerating PO. Would start lower than home dose since patient with STERLING which will decrease renal clearance of insulin    STERLING (acute kidney injury)  Patient with acute kidney injury likely due to IVVD/dehydration STERLING is currently unclear. Labs reviewed- Renal function/electrolytes with Estimated Creatinine Clearance: 40.4 mL/min (A) (based on SCr of 1.6 mg/dL (H)). according to latest data. Monitor urine output and serial BMP and adjust therapy as needed. Avoid nephrotoxins and renally dose meds for GFR listed above  Acute kidney injury improving with hydration.         VTE Risk Mitigation (From admission, onward)         Ordered     heparin (porcine) injection 5,000 Units  Every 8 hours         04/21/23 1625     IP VTE HIGH RISK PATIENT  Once         04/21/23 1625     Place sequential compression device  Until discontinued         04/21/23 1625                Discharge Planning   MARINO:      Code Status: Full Code   Is the patient medically ready for discharge?:     Reason for patient still in hospital (select all that apply): Treatment  Discharge Plan A: Home with family                   Alvino Wang MD  Department of \[facility]\"" - Intensive Care

## 2023-04-23 NOTE — ASSESSMENT & PLAN NOTE
Patient with acute kidney injury likely due to IVVD/dehydration STERLING is currently unclear. Labs reviewed- Renal function/electrolytes with Estimated Creatinine Clearance: 40.4 mL/min (A) (based on SCr of 1.6 mg/dL (H)). according to latest data. Monitor urine output and serial BMP and adjust therapy as needed. Avoid nephrotoxins and renally dose meds for GFR listed above  Acute kidney injury improving with hydration.

## 2023-04-24 ENCOUNTER — TELEPHONE (OUTPATIENT)
Dept: PODIATRY | Facility: CLINIC | Age: 63
End: 2023-04-24
Payer: MEDICAID

## 2023-04-24 VITALS
WEIGHT: 135.13 LBS | TEMPERATURE: 98 F | OXYGEN SATURATION: 99 % | RESPIRATION RATE: 18 BRPM | SYSTOLIC BLOOD PRESSURE: 158 MMHG | HEIGHT: 68 IN | DIASTOLIC BLOOD PRESSURE: 82 MMHG | HEART RATE: 79 BPM | BODY MASS INDEX: 20.48 KG/M2

## 2023-04-24 LAB
ALBUMIN SERPL BCP-MCNC: 2.4 G/DL (ref 3.5–5.2)
ALP SERPL-CCNC: 52 U/L (ref 55–135)
ALT SERPL W/O P-5'-P-CCNC: 31 U/L (ref 10–44)
ANION GAP SERPL CALC-SCNC: 15 MMOL/L (ref 8–16)
AST SERPL-CCNC: 28 U/L (ref 10–40)
BASOPHILS # BLD AUTO: 0.03 K/UL (ref 0–0.2)
BASOPHILS NFR BLD: 0.5 % (ref 0–1.9)
BILIRUB SERPL-MCNC: 0.2 MG/DL (ref 0.1–1)
BUN SERPL-MCNC: 16 MG/DL (ref 8–23)
CALCIUM SERPL-MCNC: 8.9 MG/DL (ref 8.7–10.5)
CHLORIDE SERPL-SCNC: 105 MMOL/L (ref 95–110)
CO2 SERPL-SCNC: 16 MMOL/L (ref 23–29)
CREAT SERPL-MCNC: 1.3 MG/DL (ref 0.5–1.4)
DIFFERENTIAL METHOD: ABNORMAL
EOSINOPHIL # BLD AUTO: 0.1 K/UL (ref 0–0.5)
EOSINOPHIL NFR BLD: 1.4 % (ref 0–8)
ERYTHROCYTE [DISTWIDTH] IN BLOOD BY AUTOMATED COUNT: 13.9 % (ref 11.5–14.5)
EST. GFR  (NO RACE VARIABLE): >60 ML/MIN/1.73 M^2
GLUCOSE SERPL-MCNC: 139 MG/DL (ref 70–110)
HCT VFR BLD AUTO: 25.9 % (ref 40–54)
HGB BLD-MCNC: 8.5 G/DL (ref 14–18)
IMM GRANULOCYTES # BLD AUTO: 0.02 K/UL (ref 0–0.04)
IMM GRANULOCYTES NFR BLD AUTO: 0.4 % (ref 0–0.5)
LYMPHOCYTES # BLD AUTO: 0.8 K/UL (ref 1–4.8)
LYMPHOCYTES NFR BLD: 14.2 % (ref 18–48)
MCH RBC QN AUTO: 28.1 PG (ref 27–31)
MCHC RBC AUTO-ENTMCNC: 32.8 G/DL (ref 32–36)
MCV RBC AUTO: 86 FL (ref 82–98)
MONOCYTES # BLD AUTO: 0.7 K/UL (ref 0.3–1)
MONOCYTES NFR BLD: 12 % (ref 4–15)
NEUTROPHILS # BLD AUTO: 4 K/UL (ref 1.8–7.7)
NEUTROPHILS NFR BLD: 71.5 % (ref 38–73)
NRBC BLD-RTO: 0 /100 WBC
PLATELET # BLD AUTO: 225 K/UL (ref 150–450)
PMV BLD AUTO: 11.4 FL (ref 9.2–12.9)
POCT GLUCOSE: 144 MG/DL (ref 70–110)
POCT GLUCOSE: 174 MG/DL (ref 70–110)
POCT GLUCOSE: 181 MG/DL (ref 70–110)
POCT GLUCOSE: 186 MG/DL (ref 70–110)
POTASSIUM SERPL-SCNC: 4.1 MMOL/L (ref 3.5–5.1)
PROT SERPL-MCNC: 6.3 G/DL (ref 6–8.4)
RBC # BLD AUTO: 3.02 M/UL (ref 4.6–6.2)
SODIUM SERPL-SCNC: 136 MMOL/L (ref 136–145)
WBC # BLD AUTO: 5.65 K/UL (ref 3.9–12.7)

## 2023-04-24 PROCEDURE — 63600175 PHARM REV CODE 636 W HCPCS: Performed by: STUDENT IN AN ORGANIZED HEALTH CARE EDUCATION/TRAINING PROGRAM

## 2023-04-24 PROCEDURE — 99233 SBSQ HOSP IP/OBS HIGH 50: CPT | Mod: ,,, | Performed by: STUDENT IN AN ORGANIZED HEALTH CARE EDUCATION/TRAINING PROGRAM

## 2023-04-24 PROCEDURE — 25000003 PHARM REV CODE 250: Performed by: HOSPITALIST

## 2023-04-24 PROCEDURE — 25000003 PHARM REV CODE 250: Performed by: STUDENT IN AN ORGANIZED HEALTH CARE EDUCATION/TRAINING PROGRAM

## 2023-04-24 PROCEDURE — 99233 PR SUBSEQUENT HOSPITAL CARE,LEVL III: ICD-10-PCS | Mod: ,,, | Performed by: STUDENT IN AN ORGANIZED HEALTH CARE EDUCATION/TRAINING PROGRAM

## 2023-04-24 PROCEDURE — 25000003 PHARM REV CODE 250: Performed by: INTERNAL MEDICINE

## 2023-04-24 PROCEDURE — C1751 CATH, INF, PER/CENT/MIDLINE: HCPCS

## 2023-04-24 PROCEDURE — 36569 INSJ PICC 5 YR+ W/O IMAGING: CPT

## 2023-04-24 PROCEDURE — 99232 SBSQ HOSP IP/OBS MODERATE 35: CPT | Mod: 24,,, | Performed by: PODIATRIST

## 2023-04-24 PROCEDURE — 80053 COMPREHEN METABOLIC PANEL: CPT | Performed by: STUDENT IN AN ORGANIZED HEALTH CARE EDUCATION/TRAINING PROGRAM

## 2023-04-24 PROCEDURE — 99232 PR SUBSEQUENT HOSPITAL CARE,LEVL II: ICD-10-PCS | Mod: 24,,, | Performed by: PODIATRIST

## 2023-04-24 PROCEDURE — 85025 COMPLETE CBC W/AUTO DIFF WBC: CPT | Performed by: STUDENT IN AN ORGANIZED HEALTH CARE EDUCATION/TRAINING PROGRAM

## 2023-04-24 RX ORDER — CIPROFLOXACIN 500 MG/1
500 TABLET ORAL EVERY 12 HOURS
Qty: 48 TABLET | Refills: 0 | Status: SHIPPED | OUTPATIENT
Start: 2023-04-24 | End: 2023-04-25 | Stop reason: SDUPTHER

## 2023-04-24 RX ORDER — CEFTRIAXONE 1 G/1
2 INJECTION, POWDER, FOR SOLUTION INTRAMUSCULAR; INTRAVENOUS DAILY
Qty: 54 G | Refills: 0 | Status: SHIPPED | OUTPATIENT
Start: 2023-04-25 | End: 2023-05-22

## 2023-04-24 RX ADMIN — POLYETHYLENE GLYCOL 3350 17 G: 17 POWDER, FOR SOLUTION ORAL at 08:04

## 2023-04-24 RX ADMIN — HEPARIN SODIUM 5000 UNITS: 5000 INJECTION, SOLUTION INTRAVENOUS; SUBCUTANEOUS at 05:04

## 2023-04-24 RX ADMIN — OXYCODONE HYDROCHLORIDE AND ACETAMINOPHEN 1 TABLET: 5; 325 TABLET ORAL at 03:04

## 2023-04-24 RX ADMIN — MORPHINE SULFATE 1 MG: 2 INJECTION, SOLUTION INTRAMUSCULAR; INTRAVENOUS at 11:04

## 2023-04-24 RX ADMIN — MUPIROCIN: 20 OINTMENT TOPICAL at 08:04

## 2023-04-24 RX ADMIN — GABAPENTIN 100 MG: 100 CAPSULE ORAL at 03:04

## 2023-04-24 RX ADMIN — GABAPENTIN 100 MG: 100 CAPSULE ORAL at 08:04

## 2023-04-24 RX ADMIN — DOCUSATE SODIUM 50MG AND SENNOSIDES 8.6MG 1 TABLET: 8.6; 5 TABLET, FILM COATED ORAL at 08:04

## 2023-04-24 RX ADMIN — CEFTRIAXONE SODIUM 2 G: 2 INJECTION, POWDER, FOR SOLUTION INTRAMUSCULAR; INTRAVENOUS at 11:04

## 2023-04-24 RX ADMIN — SODIUM BICARBONATE 650 MG TABLET 650 MG: at 08:04

## 2023-04-24 RX ADMIN — CLINDAMYCIN IN 5 PERCENT DEXTROSE 600 MG: 12 INJECTION, SOLUTION INTRAVENOUS at 10:04

## 2023-04-24 RX ADMIN — HEPARIN SODIUM 5000 UNITS: 5000 INJECTION, SOLUTION INTRAVENOUS; SUBCUTANEOUS at 03:04

## 2023-04-24 RX ADMIN — CLINDAMYCIN IN 5 PERCENT DEXTROSE 600 MG: 12 INJECTION, SOLUTION INTRAVENOUS at 04:04

## 2023-04-24 NOTE — NURSING
Several attempts made to educate patient on importance of decreasing pressure to R foot against footboard. Pt verbalizes understanding yet still noncompliant.

## 2023-04-24 NOTE — PROGRESS NOTES
[facility] Medicine  Progress Note    Patient Name: Juan Pablo Chang  MRN: [id number]  Patient Class: IP- Inpatient   Admission Date: 4/21/2023  Length of Stay: 3 days  Attending Physician: Devin Baker MD  Primary Care Provider: EDNA Butler        Subjective:     Principal Problem:Sepsis        HPI:  61 y/o male with PMH of T2DM, neuropathy, HTN, HFpEF, recent right TMA due to abscess and possible early osteomyelitis who presents today with fever and redness to right foot. Patient was recently admitted 3/31-4/7 for right foot abscess and early osteo. He underwent right TMA with podiatry. Wound cultures grew staph sensitive to moxifloxacin. He was discharged to SNF but only stayed 3 days. It is unclear if he has been taking abx. He states for past 2-3 days his appetite has been decreased. He has not noticed any pain in the foot or drainage. No NV. No cough, SOB, Weakness. Brother brought patient in today because he was having fever.      Overview/Hospital Course:  No notes on file    Interval History: BC resulted in MDRO Ecoli. Will need 4 weeks IV rocephin 2g daily for bone infection. Working to discharge patient today with home health but might take 24h to set up.    Review of Systems   All other systems reviewed and are negative.  Objective:     Vital Signs (Most Recent):  Temp: 98.1 °F (36.7 °C) (04/24/23 1123)  Pulse: 81 (04/24/23 1123)  Resp: 18 (04/24/23 1123)  BP: (!) 155/76 (04/24/23 1123)  SpO2: 99 % (04/24/23 1123)   Vital Signs (24h Range):  Temp:  [97.9 °F (36.6 °C)-98.7 °F (37.1 °C)] 98.1 °F (36.7 °C)  Pulse:  [73-81] 81  Resp:  [16-18] 18  SpO2:  [95 %-99 %] 99 %  BP: (113-168)/(56-79) 155/76     Weight: 61.3 kg (135 lb 2.3 oz)  Body mass index is 20.55 kg/m².    Intake/Output Summary (Last 24 hours) at 4/24/2023 1338  Last data filed at 4/24/2023 0729  Gross per 24 hour   Intake --   Output 2125 ml   Net -2125 ml      Physical Exam  Vitals reviewed.    Constitutional:       General: He is not in acute distress.     Appearance: Normal appearance.   HENT:      Head: Normocephalic and atraumatic.   Eyes:      Extraocular Movements: Extraocular movements intact.      Pupils: Pupils are equal, round, and reactive to light.   Cardiovascular:      Rate and Rhythm: Normal rate and regular rhythm.   Pulmonary:      Effort: Pulmonary effort is normal. No respiratory distress.   Abdominal:      Palpations: Abdomen is soft.      Tenderness: There is no abdominal tenderness.   Musculoskeletal:      Right lower leg: No edema.      Left lower leg: No edema.      Comments: Right foot with recent TMA. Stitches in place. No drainage. No crepitus or induration. Redness improved from prior exam  Skin:     General: Skin is warm and dry.   Neurological:      General: No focal deficit present.      Mental Status: He is alert and oriented to person, place, and time.   Psychiatric:         Mood and Affect: Mood normal.         Behavior: Behavior normal.     Significant Labs: All pertinent labs within the past 24 hours have been reviewed.    Significant Imaging: I have reviewed all pertinent imaging results/findings within the past 24 hours.      Assessment/Plan:      * Sepsis  This patient does have evidence of infective focus  My overall impression is sepsis.  Source: Skin and Soft Tissue (location Right foot surgical site)  Antibiotics given-   Antibiotics (72h ago, onward)      Start     Stop Route Frequency Ordered    04/24/23 1215  cefTRIAXone (ROCEPHIN) 2 g in dextrose 5 % in water (D5W) 5 % 50 mL IVPB (MB+)         -- IV Every 24 hours (non-standard times) 04/24/23 1104    04/22/23 0900  mupirocin 2 % ointment         04/27 0859 Nasl 2 times daily 04/22/23 0726    04/21/23 1730  clindamycin in D5W 600 mg/50 mL IVPB 600 mg         -- IV Every 8 hours (non-standard times) 04/21/23 1616          Latest lactate reviewed-  Recent Labs   Lab 04/21/23  1448 04/21/23  1825   LACTATE 1.7  0.8     Organ dysfunction indicated by Acute kidney injury    Fluid challenge Ideal Body Weight- The patient's ideal body weight is Ideal body weight: 68.4 kg (150 lb 12.7 oz) which will be used to calculate fluid bolus of 30 ml/kg for treatment of septic shock.      Post- resuscitation assessment No - Post resuscitation assessment not needed       Will Not start Pressors- Levophed for MAP of 65  Source control achieved by: IV abx, Podiatry consulted- appreciate recommendations  CT foot w/o contrast completed and  Results reviewed    Microbiology Results (last 7 days)       Procedure Component Value Units Date/Time    Blood culture [[id number]]  (Abnormal)  (Susceptibility) Collected: 04/21/23 1449    Order Status: Completed Specimen: Blood from Peripheral, Antecubital, Right Updated: 04/24/23 1313     Blood Culture, Routine Gram stain janice bottle: Gram negative rods      Results called to and read back by: Li Julian LPN 04/22/2023  08:58      ESCHERICHIA COLI    Narrative:      Specimen # 1    Aerobic culture [[id number]]  (Abnormal) Collected: 04/21/23 1622    Order Status: Completed Specimen: Wound from Foot, Right Updated: 04/24/23 0729     Aerobic Bacterial Culture GRAM NEGATIVE PIEDAD  Many  Identification and susceptibility pending      Blood culture [[id number]] Collected: 04/21/23 1544    Order Status: Completed Specimen: Blood Updated: 04/23/23 2312     Blood Culture, Routine No Growth to date      No Growth to date      No Growth to date    Narrative:      Specimen # 2    Culture, Anaerobe [[id number]] Collected: 04/21/23 1623    Order Status: Completed Specimen: Wound from Foot, Right Updated: 04/23/23 0818     Anaerobic Culture Culture in progress    Rapid Organism ID by PCR (from Blood culture) [[id number]]  (Abnormal) Collected: 04/21/23 1449    Order Status: Completed Updated: 04/22/23 1026     Enterococcus faecalis Not Detected     Enterococcus faecium Not Detected     Listeria Monocytogenes Not Detected      Staphylococcus spp. Not Detected     Staphylococcus aureus Not Detected     Staphylococcus epidermidis Not Detected     Staphylococcus lugdunensis Not Detected     Streptococcus species Not Detected     Streptococcus agalactiae Not Detected     Streptococcus pneumoniae Not Detected     Streptococcus pyogenes Not Detected     Acinetobacter calcoaceticus/baumannii complex Not Detected     Bacteroides fragilis Not Detected     Enterobacerales See species for ID     Enterobacter cloacae complex Not Detected     Escherichia Detected     Klebsiella aerogenes Not Detected     Klebsiella oxytoca Not Detected     Klebsiella pneumoniae group Not Detected     Proteus Not Detected     Salmonella sp Not Detected     Serratia marcescens Not Detected     Haemophilus influenzae Not Detected     Neisseria meningtidis Not Detected     Pseudomonas aeruginosa Not Detected     Stenotrophomonas maltophilia Not Detected     Candida albicans Not Detected     Candida auris Not Detected     Candida glabrata Not Detected     Candida krusei Not Detected     Candida parapsilosis Not Detected     Candida tropicalis Not Detected     Cryptococcus neoformans/gattii Not Detected     CTX-M (ESBL ) Not Detected     IMP (Carbapenem resistant) Not Detected     KPC resistance gene (Carbapenem resistant) Not Detected     mcr-1  Not Detected     mec A/C  Not Detected     mec A/C and MREJ (MRSA) gene Not Detected     NDM (Carbapenem resistant) Not Detected     OXA-48-like (Carbapenem resistant) Not Detected     van A/B (VRE gene) Not Detected     VIM (Carbapenem resistant) Not Detected    Narrative:      Specimen # 1    Influenza A & B by Molecular [[id number]] Collected: 04/21/23 1623    Order Status: Completed Specimen: Nasopharyngeal Swab Updated: 04/21/23 1648     Influenza A, Molecular Negative     Influenza B, Molecular Negative     Flu A & B Source Nasal Swab          Rocephin 2g daily 4 weeks  Will discuss other abx with podiatry but likey  will use cipro for MSSA that grew on wound culture earlier this month  PICC consult placed    Noncompliance with treatment  We will encourage patient to take medications as prescribed and keep follow-up appointments at time of discharge  Again encouraged patient to take medications to prevent additional amputation      Hyperkalemia  Resolved      Chronic diastolic heart failure  Does not appear to be in acute exacerbation  Will hold further IVF after 30cc/kg resuscitation for sepsis        Primary hypertension  Can resume home lisinopril when needed      Type 2 diabetes mellitus with hypoglycemia without coma, without long-term current use of insulin  Patient's FSGs are controlled on current medication regimen.  Last A1c reviewed-   Lab Results   Component Value Date    HGBA1C 6.5 (H) 03/31/2023     Most recent fingerstick glucose reviewed-   Recent Labs   Lab 04/23/23  1618 04/23/23  2141 04/24/23  0729   POCTGLUCOSE 236* 186* 144*     Current correctional scale  Low  Maintain anti-hyperglycemic dose as follows-   Antihyperglycemics (From admission, onward)      Start     Stop Route Frequency Ordered    04/21/23 1726  insulin aspart U-100 pen 0-5 Units         -- SubQ Before meals & nightly PRN 04/21/23 1627          Hold Oral hypoglycemics while patient is in the hospital. Can restart home insulin when patient tolerating PO. Would start lower than home dose since patient with STERLING which will decrease renal clearance of insulin    STERLING (acute kidney injury)  Patient with acute kidney injury likely due to IVVD/dehydration STERLING is currently improving. Labs reviewed- Renal function/electrolytes with Estimated Creatinine Clearance: 49.8 mL/min (based on SCr of 1.3 mg/dL). according to latest data. Monitor urine output and serial BMP and adjust therapy as needed. Avoid nephrotoxins and renally dose meds for GFR listed above  Acute kidney injury improving with hydration.         VTE Risk Mitigation (From admission, onward)            Ordered     heparin (porcine) injection 5,000 Units  Every 8 hours         04/21/23 1625     IP VTE HIGH RISK PATIENT  Once         04/21/23 1625     Place sequential compression device  Until discontinued         04/21/23 1625                    Discharge Planning   MARION: 4/24/2023     Code Status: Full Code   Is the patient medically ready for discharge?:     Reason for patient still in hospital (select all that apply): Treatment  Discharge Plan A: Home Health   Discharge Delays: None known at this time              Devin Baker MD  Department of Hospital Medicine   [place] [facility]

## 2023-04-24 NOTE — SUBJECTIVE & OBJECTIVE
Interval History: BC resulted in MDRO Ecoli. Will need 4 weeks IV rocephin 2g daily for bone infection. Working to discharge patient today with home health but might take 24h to set up.    Review of Systems   All other systems reviewed and are negative.  Objective:     Vital Signs (Most Recent):  Temp: 98.1 °F (36.7 °C) (04/24/23 1123)  Pulse: 81 (04/24/23 1123)  Resp: 18 (04/24/23 1123)  BP: (!) 155/76 (04/24/23 1123)  SpO2: 99 % (04/24/23 1123)   Vital Signs (24h Range):  Temp:  [97.9 °F (36.6 °C)-98.7 °F (37.1 °C)] 98.1 °F (36.7 °C)  Pulse:  [73-81] 81  Resp:  [16-18] 18  SpO2:  [95 %-99 %] 99 %  BP: (113-168)/(56-79) 155/76     Weight: 61.3 kg (135 lb 2.3 oz)  Body mass index is 20.55 kg/m².    Intake/Output Summary (Last 24 hours) at 4/24/2023 1338  Last data filed at 4/24/2023 0729  Gross per 24 hour   Intake --   Output 2125 ml   Net -2125 ml      Physical Exam  Vitals reviewed.   Constitutional:       General: He is not in acute distress.     Appearance: Normal appearance.   HENT:      Head: Normocephalic and atraumatic.   Eyes:      Extraocular Movements: Extraocular movements intact.      Pupils: Pupils are equal, round, and reactive to light.   Cardiovascular:      Rate and Rhythm: Normal rate and regular rhythm.   Pulmonary:      Effort: Pulmonary effort is normal. No respiratory distress.   Abdominal:      Palpations: Abdomen is soft.      Tenderness: There is no abdominal tenderness.   Musculoskeletal:      Right lower leg: No edema.      Left lower leg: No edema.      Comments: Right foot with recent TMA. Stitches in place. No drainage. No crepitus or induration. Redness improved from prior exam  Skin:     General: Skin is warm and dry.   Neurological:      General: No focal deficit present.      Mental Status: He is alert and oriented to person, place, and time.   Psychiatric:         Mood and Affect: Mood normal.         Behavior: Behavior normal.     Significant Labs: All pertinent labs within the  past 24 hours have been reviewed.    Significant Imaging: I have reviewed all pertinent imaging results/findings within the past 24 hours.

## 2023-04-24 NOTE — TELEPHONE ENCOUNTER
Spoke with case jeanne since they are discharging patient today. Appointment time was given for follow up this Wednesday. Case mgmt will let the patient know.

## 2023-04-24 NOTE — PLAN OF CARE
[facility] - Intensive Care  Discharge Final Note    Primary Care Provider: EDNA Butler    Expected Discharge Date: 4/24/2023    Patient provided with verbal & written follow up appointments with Dr Donaldson & Kori Clark NP. Also home health has been set up [facility] will provide IV antibiotic. Demonstrated understanding by verbal feedback. Waiting for his brother to come pick him up.     Final Discharge Note (most recent)       Final Note - 04/24/23 1638          Final Note    Assessment Type Final Discharge Note     Anticipated Discharge Disposition Home-Health Care [facility] [place]     What phone number can be called within the next 1-3 days to see how you are doing after discharge? [phone]     Hospital Resources/Appts/Education Provided Appointments scheduled and added to AVS        Post-Acute Status    Post-Acute Authorization Home Health;Medications     Home Health Status Set-up Complete/Auth obtained     Medication Status Set-up complete/Auth obtained     Discharge Delays None known at this time                     Important Message from Medicare             Contact Info       EDNA Butler   Specialty: Family Medicine   Relationship: PCP - General    [place] - [facility]  [address] [place] [address] [place] [address]   Phone: [phone]       Next Steps: Go on 5/1/2023    Instructions: appointment Monday May 1st at 11:20am for hospital follow up    Orion Donaldson DPM   Specialty: Podiatry    [address] [facility] [place] [address]   Phone: [phone]       Next Steps: Go on 4/26/2023    Instructions: appointment Wednesday April 26th at 3:15pm for podiatry follow up    [facility] [place]   Specialty: Home Health Services, Home Therapy Services, Home Living Aide Services    [address] [place] [address]  [place] [address]   Phone: [phone]       Next Steps: Follow up    Instructions: will provide  home health services; CALL THEM ONCE HE MOVES TO [place] TO LET THEM KNOW THE NEW ADDRESS OF WHERE HE WILL BE LIVING    [facility]    [address]  [place] [address]   Phone: [phone]       Next Steps: Follow up    Instructions: will provide IV antibiotics; CALL THEM ONCE HE MOVES TO [place] TO LET THEM KNOW THE NEW ADDRESS OF WHERE HE WILL BE LIVING SO THE MEDICATIONS CAN BE DELIVERED

## 2023-04-24 NOTE — ASSESSMENT & PLAN NOTE
This patient does have evidence of infective focus  My overall impression is sepsis.  Source: Skin and Soft Tissue (location Right foot surgical site)  Antibiotics given-   Antibiotics (72h ago, onward)    Start     Stop Route Frequency Ordered    04/24/23 1215  cefTRIAXone (ROCEPHIN) 2 g in dextrose 5 % in water (D5W) 5 % 50 mL IVPB (MB+)         -- IV Every 24 hours (non-standard times) 04/24/23 1104    04/22/23 0900  mupirocin 2 % ointment         04/27 0859 Nasl 2 times daily 04/22/23 0726    04/21/23 1730  clindamycin in D5W 600 mg/50 mL IVPB 600 mg         -- IV Every 8 hours (non-standard times) 04/21/23 1616        Latest lactate reviewed-  Recent Labs   Lab 04/21/23  1448 04/21/23  1825   LACTATE 1.7 0.8     Organ dysfunction indicated by Acute kidney injury    Fluid challenge Ideal Body Weight- The patient's ideal body weight is Ideal body weight: 68.4 kg (150 lb 12.7 oz) which will be used to calculate fluid bolus of 30 ml/kg for treatment of septic shock.      Post- resuscitation assessment No - Post resuscitation assessment not needed       Will Not start Pressors- Levophed for MAP of 65  Source control achieved by: IV abx, Podiatry consulted- appreciate recommendations  CT foot w/o contrast completed and  Results reviewed    Microbiology Results (last 7 days)     Procedure Component Value Units Date/Time    Blood culture [[id number]]  (Abnormal)  (Susceptibility) Collected: 04/21/23 1449    Order Status: Completed Specimen: Blood from Peripheral, Antecubital, Right Updated: 04/24/23 1313     Blood Culture, Routine Gram stain janice bottle: Gram negative rods      Results called to and read back by: Li Julian LPN 04/22/2023  08:58      ESCHERICHIA COLI    Narrative:      Specimen # 1    Aerobic culture [[id number]]  (Abnormal) Collected: 04/21/23 1622    Order Status: Completed Specimen: Wound from Foot, Right Updated: 04/24/23 0729     Aerobic Bacterial Culture GRAM NEGATIVE PIEDAD  Many  Identification and  susceptibility pending      Blood culture [[id number]] Collected: 04/21/23 1544    Order Status: Completed Specimen: Blood Updated: 04/23/23 2312     Blood Culture, Routine No Growth to date      No Growth to date      No Growth to date    Narrative:      Specimen # 2    Culture, Anaerobe [[id number]] Collected: 04/21/23 1623    Order Status: Completed Specimen: Wound from Foot, Right Updated: 04/23/23 0818     Anaerobic Culture Culture in progress    Rapid Organism ID by PCR (from Blood culture) [[id number]]  (Abnormal) Collected: 04/21/23 1449    Order Status: Completed Updated: 04/22/23 1026     Enterococcus faecalis Not Detected     Enterococcus faecium Not Detected     Listeria Monocytogenes Not Detected     Staphylococcus spp. Not Detected     Staphylococcus aureus Not Detected     Staphylococcus epidermidis Not Detected     Staphylococcus lugdunensis Not Detected     Streptococcus species Not Detected     Streptococcus agalactiae Not Detected     Streptococcus pneumoniae Not Detected     Streptococcus pyogenes Not Detected     Acinetobacter calcoaceticus/baumannii complex Not Detected     Bacteroides fragilis Not Detected     Enterobacerales See species for ID     Enterobacter cloacae complex Not Detected     Escherichia Detected     Klebsiella aerogenes Not Detected     Klebsiella oxytoca Not Detected     Klebsiella pneumoniae group Not Detected     Proteus Not Detected     Salmonella sp Not Detected     Serratia marcescens Not Detected     Haemophilus influenzae Not Detected     Neisseria meningtidis Not Detected     Pseudomonas aeruginosa Not Detected     Stenotrophomonas maltophilia Not Detected     Candida albicans Not Detected     Candida auris Not Detected     Candida glabrata Not Detected     Candida krusei Not Detected     Candida parapsilosis Not Detected     Candida tropicalis Not Detected     Cryptococcus neoformans/gattii Not Detected     CTX-M (ESBL ) Not Detected     IMP (Carbapenem  resistant) Not Detected     KPC resistance gene (Carbapenem resistant) Not Detected     mcr-1  Not Detected     mec A/C  Not Detected     mec A/C and MREJ (MRSA) gene Not Detected     NDM (Carbapenem resistant) Not Detected     OXA-48-like (Carbapenem resistant) Not Detected     van A/B (VRE gene) Not Detected     VIM (Carbapenem resistant) Not Detected    Narrative:      Specimen # 1    Influenza A & B by Molecular [[id number]] Collected: 04/21/23 1623    Order Status: Completed Specimen: Nasopharyngeal Swab Updated: 04/21/23 1648     Influenza A, Molecular Negative     Influenza B, Molecular Negative     Flu A & B Source Nasal Swab        Rocephin 2g daily 4 weeks  Will discuss other abx with podiatry but likey will use cipro for MSSA that grew on wound culture earlier this month  PICC consult placed

## 2023-04-24 NOTE — ASSESSMENT & PLAN NOTE
Patient with acute kidney injury likely due to IVVD/dehydration STERLING is currently improving. Labs reviewed- Renal function/electrolytes with Estimated Creatinine Clearance: 49.8 mL/min (based on SCr of 1.3 mg/dL). according to latest data. Monitor urine output and serial BMP and adjust therapy as needed. Avoid nephrotoxins and renally dose meds for GFR listed above  Acute kidney injury improving with hydration.

## 2023-04-24 NOTE — ASSESSMENT & PLAN NOTE
Does not appear to be in acute exacerbation  Will hold further IVF after 30cc/kg resuscitation for sepsis

## 2023-04-24 NOTE — PROGRESS NOTES
[facility] - Intensive Care  Podiatry  Progress Note    Patient Name: Juan Pablo Chang  MRN: [id number]  Admission Date: 4/21/2023  Hospital Length of Stay: 3 days  Attending Physician: Devin Baker MD  Primary Care Provider: EDNA Butler     Subjective:     Interval History:  Patient presents for follow-up sepsis and status post partial foot amputation right.        Scheduled Meds:   ciprofloxacin  400 mg Intravenous Q12H    clindamycin (CLEOCIN) IVPB  600 mg Intravenous Q8H    gabapentin  100 mg Oral TID    heparin (porcine)  5,000 Units Subcutaneous Q8H    mupirocin   Nasal BID    polyethylene glycol  17 g Oral Daily    senna-docusate 8.6-50 mg  1 tablet Oral BID    sodium bicarbonate  650 mg Oral BID     Continuous Infusions:  PRN Meds:dextrose 50%, dextrose 50%, dextrose 50%, dextrose 50%, glucagon (human recombinant), glucagon (human recombinant), glucose, glucose, glucose, glucose, insulin aspart U-100, morphine, naloxone, ondansetron, oxyCODONE-acetaminophen, sodium chloride 0.9%    Review of Systems  Objective:     Vital Signs (Most Recent):  Temp: 97.9 °F (36.6 °C) (04/24/23 0730)  Pulse: 77 (04/24/23 0730)  Resp: 18 (04/24/23 0730)  BP: (!) 161/79 (04/24/23 0730)  SpO2: 98 % (04/24/23 0730)   Vital Signs (24h Range):  Temp:  [97.8 °F (36.6 °C)-98.7 °F (37.1 °C)] 97.9 °F (36.6 °C)  Pulse:  [73-80] 77  Resp:  [16-18] 18  SpO2:  [95 %-98 %] 98 %  BP: (113-168)/(56-79) 161/79     Weight: 61.3 kg (135 lb 2.3 oz)  Body mass index is 20.55 kg/m².    Foot Exam  Foot Exam     Right Foot/Ankle      Inspection and Palpation  Skin Exam: erythema;      Neurovascular  Dorsalis pedis: 1+  Posterior tibial: 1+        Left Foot/Ankle       Neurovascular  Dorsalis pedis: 1+  Posterior tibial: 1+        Physical Exam  Vitals and nursing note reviewed.   Constitutional:       Appearance: Normal appearance.   Cardiovascular:      Pulses:           Dorsalis pedis pulses are 1+ on the right side and 1+ on the left side.         Posterior tibial pulses are 1+ on the right side and 1+ on the left side.   Pulmonary:      Effort: Pulmonary effort is normal.   Musculoskeletal:         General: Swelling present.      Right lower leg: Edema present.      Right foot: Deformity present.        Feet:    Feet:      Right foot:      Protective Sensation: 4 sites tested.  0 sites sensed.      Skin integrity: Erythema and warmth present.      Left foot:      Protective Sensation: 4 sites tested.  0 sites sensed.   Skin:     Capillary Refill: Capillary refill takes more than 3 seconds.   Neurological:      Mental Status: He is alert.      Sensory: Sensory deficit present.   Psychiatric:         Mood and Affect: Mood normal.         Behavior: Behavior normal.         Laboratory:  All pertinent labs reviewed within the last 24 hours.    Diagnostic Results:  None    Clinical Findings:            Assessment/Plan:     Active Diagnoses:    Diagnosis Date Noted POA    PRINCIPAL PROBLEM:  Sepsis [A41.9] 04/21/2023 Yes    Noncompliance with treatment [Z91.199] 04/22/2023 Not Applicable    Primary hypertension [I10] 04/21/2023 Yes    Chronic diastolic heart failure [I50.32] 04/21/2023 Yes    Hyperkalemia [E87.5] 04/21/2023 Yes    STERLING (acute kidney injury) [N17.9] 04/01/2023 Yes    Type 2 diabetes mellitus with hypoglycemia without coma, without long-term current use of insulin [E11.649] 04/01/2023 Yes      Problems Resolved During this Admission:       Patient presents today for follow-up inpatient consultation due to previous partial amputation right foot patient has been noncompliant postoperatively which is led to maceration some degree of breakdown of the amputation site however it is stable now all the previously noted erythema edema is well resolving at this time there is no further maceration no drainage noted patient advised he needs to keep the area dry and protected.  Patient's WBCs have dramatically decreased from my standpoint the patient is  ready for discharge this will be up to hospitalist medicine because the patient did have a positive blood culture.  Dressing will be applied to the patient's right foot upon discharge in the meantime we are going to continue to keep the foot open with Betadine applied to the incision daily.  Patient is remain nonweightbearing keep the area dry and clean.  Will follow up with the patient in clinic upon discharge.  Patient already has a prescription for Cipro which he started just prior to admission.  Ultimately patient is being seen for unrelated evaluation and management in hospital due to noncompliance postop leading to subsequent incision breakdown and drainage.  Patient has considerable improvement of the amputation site and needs to be compliant follow my postoperative instructions.  This note was created using [employer] voice recognition software that occasionally misinterpreted phrases or words.     Orion Donaldson DPM  Podiatry  [facility] - Intensive Care

## 2023-04-24 NOTE — PLAN OF CARE
[facility] - Intensive Care  Discharge Reassessment    Primary Care Provider: EDNA Butler    Expected Discharge Date: 4/24/2023    Patient to be discharged today. Spoke to patient's brother Earlene Chang who states he will be staying with him at [address], [place], [place]. States he is hoping to get him into the [facility] in [place] this week. He is going to pay his money on Wednesday. Explained to him that he would need to give the home health & infusion company the correct address once he moves. Demonstrated understanding by verbal feedback. Will continue to assist with discharge needs with [facility] & [facility].  Referrals sent to both places. Waiting to see if he has any medications slots available & if [facility] can take him for 4 visits.       Reassessment (most recent)       Discharge Reassessment - 04/24/23 1240          Discharge Reassessment    Assessment Type Discharge Planning Reassessment     Did the patient's condition or plan change since previous assessment? Yes     Discharge Plan discussed with: Patient;Sibling     Name(s) and Number(s) Earlene Chang brother [phone]     Communicated MARION with patient/caregiver Yes     Discharge Plan A Home Health     DME Needed Upon Discharge  none     Discharge Barriers Identified None     Why the patient remains in the hospital Requires continued medical care        Post-Acute Status    Post-Acute Authorization Home Health;Medications     Home Health Status Referrals Sent     Medication Status Referral(s) sent     Hospital Resources/Appts/Education Provided Appointments scheduled and added to AVS     Discharge Delays None known at this time

## 2023-04-24 NOTE — NURSING
Pt discharged to home with home health for infusion services. R PICC CDI upon discharge. R foot padded and wrapped with boot applied. IV discontinued. Tele monitor discontinued. Education provided with patient verbalized understanding. Family at bedside. All needs met and anticipated.

## 2023-04-24 NOTE — PROGRESS NOTES
Picc line order verified for infusion of antibiotics . Procedure explained to patient and consent on chart. Right arm prepped in sterile fashion and vein identified via ultrasound. Right basilic accessed x 1 stick with positive blood return and guidewire inserted without difficulty. Dilator placed over wire. Dilator advanced with no resistance. Guidewire removed and intact. Blood return noted from dilator. Picc line pre cut at 36cm and advanced through dilator to zero tierra on catheter. .Positive blood return noted to both ports.  Sterile dressing placed. Xray for placement and verified picc line in satisfactory position in the distal  SVC per radiology report. Notified medical  ok to use picc line. RN  verbalized understanding.

## 2023-04-24 NOTE — ASSESSMENT & PLAN NOTE
This patient does have evidence of infective focus  My overall impression is sepsis.  Source: Skin and Soft Tissue (location Right foot surgical site)  Antibiotics given-   Antibiotics (72h ago, onward)    Start     Stop Route Frequency Ordered    04/24/23 1215  cefTRIAXone (ROCEPHIN) 2 g in dextrose 5 % in water (D5W) 5 % 50 mL IVPB (MB+)         -- IV Every 24 hours (non-standard times) 04/24/23 1104    04/22/23 0900  mupirocin 2 % ointment         04/27 0859 Nasl 2 times daily 04/22/23 0726    04/21/23 1730  clindamycin in D5W 600 mg/50 mL IVPB 600 mg         -- IV Every 8 hours (non-standard times) 04/21/23 1616        Latest lactate reviewed-  Recent Labs   Lab 04/21/23  1448 04/21/23  1825   LACTATE 1.7 0.8     Organ dysfunction indicated by Acute kidney injury    Fluid challenge Ideal Body Weight- The patient's ideal body weight is Ideal body weight: 68.4 kg (150 lb 12.7 oz) which will be used to calculate fluid bolus of 30 ml/kg for treatment of septic shock.      Post- resuscitation assessment No - Post resuscitation assessment not needed       Will Not start Pressors- Levophed for MAP of 65  Source control achieved by: IV abx, Podiatry consulted- appreciate recommendations  CT foot w/o contrast completed and  Results reviewed    Microbiology Results (last 7 days)     Procedure Component Value Units Date/Time    Blood culture [[id number]]  (Abnormal)  (Susceptibility) Collected: 04/21/23 1449    Order Status: Completed Specimen: Blood from Peripheral, Antecubital, Right Updated: 04/24/23 1313     Blood Culture, Routine Gram stain janice bottle: Gram negative rods      Results called to and read back by: Li Julian LPN 04/22/2023  08:58      ESCHERICHIA COLI    Narrative:      Specimen # 1    Aerobic culture [[id number]]  (Abnormal) Collected: 04/21/23 1622    Order Status: Completed Specimen: Wound from Foot, Right Updated: 04/24/23 0729     Aerobic Bacterial Culture GRAM NEGATIVE PIEDAD  Many  Identification and  susceptibility pending      Blood culture [[id number]] Collected: 04/21/23 1544    Order Status: Completed Specimen: Blood Updated: 04/23/23 2312     Blood Culture, Routine No Growth to date      No Growth to date      No Growth to date    Narrative:      Specimen # 2    Culture, Anaerobe [[id number]] Collected: 04/21/23 1623    Order Status: Completed Specimen: Wound from Foot, Right Updated: 04/23/23 0818     Anaerobic Culture Culture in progress    Rapid Organism ID by PCR (from Blood culture) [[id number]]  (Abnormal) Collected: 04/21/23 1449    Order Status: Completed Updated: 04/22/23 1026     Enterococcus faecalis Not Detected     Enterococcus faecium Not Detected     Listeria Monocytogenes Not Detected     Staphylococcus spp. Not Detected     Staphylococcus aureus Not Detected     Staphylococcus epidermidis Not Detected     Staphylococcus lugdunensis Not Detected     Streptococcus species Not Detected     Streptococcus agalactiae Not Detected     Streptococcus pneumoniae Not Detected     Streptococcus pyogenes Not Detected     Acinetobacter calcoaceticus/baumannii complex Not Detected     Bacteroides fragilis Not Detected     Enterobacerales See species for ID     Enterobacter cloacae complex Not Detected     Escherichia Detected     Klebsiella aerogenes Not Detected     Klebsiella oxytoca Not Detected     Klebsiella pneumoniae group Not Detected     Proteus Not Detected     Salmonella sp Not Detected     Serratia marcescens Not Detected     Haemophilus influenzae Not Detected     Neisseria meningtidis Not Detected     Pseudomonas aeruginosa Not Detected     Stenotrophomonas maltophilia Not Detected     Candida albicans Not Detected     Candida auris Not Detected     Candida glabrata Not Detected     Candida krusei Not Detected     Candida parapsilosis Not Detected     Candida tropicalis Not Detected     Cryptococcus neoformans/gattii Not Detected     CTX-M (ESBL ) Not Detected     IMP (Carbapenem  resistant) Not Detected     KPC resistance gene (Carbapenem resistant) Not Detected     mcr-1  Not Detected     mec A/C  Not Detected     mec A/C and MREJ (MRSA) gene Not Detected     NDM (Carbapenem resistant) Not Detected     OXA-48-like (Carbapenem resistant) Not Detected     van A/B (VRE gene) Not Detected     VIM (Carbapenem resistant) Not Detected    Narrative:      Specimen # 1    Influenza A & B by Molecular [[id number]] Collected: 04/21/23 1623    Order Status: Completed Specimen: Nasopharyngeal Swab Updated: 04/21/23 1648     Influenza A, Molecular Negative     Influenza B, Molecular Negative     Flu A & B Source Nasal Swab        Rocephin 2g daily 4 weeks  Will discuss other abx with podiatry but likey will use cipro for MSSA that grew on wound culture earlier this month

## 2023-04-24 NOTE — PLAN OF CARE
Problem: Adult Inpatient Plan of Care  Goal: Plan of Care Review  Outcome: Ongoing, Progressing  Goal: Patient-Specific Goal (Individualized)  Outcome: Ongoing, Progressing  Goal: Absence of Hospital-Acquired Illness or Injury  Outcome: Ongoing, Progressing  Goal: Optimal Comfort and Wellbeing  Outcome: Ongoing, Progressing  Goal: Readiness for Transition of Care  Outcome: Ongoing, Progressing     Problem: Diabetes Comorbidity  Goal: Blood Glucose Level Within Targeted Range  Outcome: Ongoing, Progressing     Problem: Adjustment to Illness (Sepsis/Septic Shock)  Goal: Optimal Coping  Outcome: Ongoing, Progressing     Problem: Glycemic Control Impaired (Sepsis/Septic Shock)  Goal: Blood Glucose Level Within Desired Range  Outcome: Ongoing, Progressing     Problem: Infection Progression (Sepsis/Septic Shock)  Goal: Absence of Infection Signs and Symptoms  Outcome: Ongoing, Progressing     Problem: Nutrition Impaired (Sepsis/Septic Shock)  Goal: Optimal Nutrition Intake  Outcome: Ongoing, Progressing     Problem: Fluid and Electrolyte Imbalance (Acute Kidney Injury/Impairment)  Goal: Fluid and Electrolyte Balance  Outcome: Ongoing, Progressing     Problem: Oral Intake Inadequate (Acute Kidney Injury/Impairment)  Goal: Optimal Nutrition Intake  Outcome: Ongoing, Progressing     Problem: Renal Function Impairment (Acute Kidney Injury/Impairment)  Goal: Effective Renal Function  Outcome: Ongoing, Progressing     Problem: Impaired Wound Healing  Goal: Optimal Wound Healing  Outcome: Ongoing, Progressing     Problem: Fall Injury Risk  Goal: Absence of Fall and Fall-Related Injury  Outcome: Ongoing, Progressing     Problem: Pain Acute  Goal: Acceptable Pain Control and Functional Ability  Outcome: Ongoing, Progressing

## 2023-04-24 NOTE — ASSESSMENT & PLAN NOTE
Patient's FSGs are controlled on current medication regimen.  Last A1c reviewed-   Lab Results   Component Value Date    HGBA1C 6.5 (H) 03/31/2023     Most recent fingerstick glucose reviewed-   Recent Labs   Lab 04/23/23  1618 04/23/23  2141 04/24/23  0729   POCTGLUCOSE 236* 186* 144*     Current correctional scale  Low  Maintain anti-hyperglycemic dose as follows-   Antihyperglycemics (From admission, onward)    Start     Stop Route Frequency Ordered    04/21/23 1726  insulin aspart U-100 pen 0-5 Units         -- SubQ Before meals & nightly PRN 04/21/23 1627        Hold Oral hypoglycemics while patient is in the hospital. Can restart home insulin when patient tolerating PO. Would start lower than home dose since patient with STERLING which will decrease renal clearance of insulin

## 2023-04-25 ENCOUNTER — PATIENT OUTREACH (OUTPATIENT)
Dept: ADMINISTRATIVE | Facility: CLINIC | Age: 63
End: 2023-04-25
Payer: MEDICAID

## 2023-04-25 RX ORDER — CIPROFLOXACIN 500 MG/1
500 TABLET ORAL EVERY 12 HOURS
Qty: 48 TABLET | Refills: 0 | Status: SHIPPED | OUTPATIENT
Start: 2023-04-25 | End: 2023-05-17 | Stop reason: SDUPTHER

## 2023-04-25 NOTE — PROGRESS NOTES
C3 nurse attempted to contact Juan Pablo Chang  for a TCC post hospital discharge follow up call. No answer. No voicemail available. The patient has a scheduled HOSFU appointment with EDNA Butler  on 5/1/23 @ 2951.

## 2023-04-26 ENCOUNTER — TELEPHONE (OUTPATIENT)
Dept: PODIATRY | Facility: CLINIC | Age: 63
End: 2023-04-26

## 2023-04-26 ENCOUNTER — OFFICE VISIT (OUTPATIENT)
Dept: PODIATRY | Facility: CLINIC | Age: 63
End: 2023-04-26
Payer: MEDICAID

## 2023-04-26 VITALS
HEART RATE: 90 BPM | HEIGHT: 68 IN | DIASTOLIC BLOOD PRESSURE: 82 MMHG | RESPIRATION RATE: 16 BRPM | BODY MASS INDEX: 20.46 KG/M2 | WEIGHT: 135 LBS | SYSTOLIC BLOOD PRESSURE: 137 MMHG

## 2023-04-26 DIAGNOSIS — S98.919A AMPUTATION OF FOOT: Primary | ICD-10-CM

## 2023-04-26 DIAGNOSIS — E11.649 TYPE 2 DIABETES MELLITUS WITH HYPOGLYCEMIA WITHOUT COMA, WITHOUT LONG-TERM CURRENT USE OF INSULIN: ICD-10-CM

## 2023-04-26 DIAGNOSIS — E11.9 COMPREHENSIVE DIABETIC FOOT EXAMINATION, TYPE 2 DM, ENCOUNTER FOR: ICD-10-CM

## 2023-04-26 LAB
BACTERIA BLD CULT: ABNORMAL
BACTERIA BLD CULT: NORMAL
BACTERIA SPEC AEROBE CULT: ABNORMAL
BACTERIA SPEC AEROBE CULT: ABNORMAL

## 2023-04-26 PROCEDURE — 3008F PR BODY MASS INDEX (BMI) DOCUMENTED: ICD-10-PCS | Mod: CPTII,,, | Performed by: PODIATRIST

## 2023-04-26 PROCEDURE — 99024 POSTOP FOLLOW-UP VISIT: CPT | Mod: ,,, | Performed by: PODIATRIST

## 2023-04-26 PROCEDURE — 3075F SYST BP GE 130 - 139MM HG: CPT | Mod: CPTII,,, | Performed by: PODIATRIST

## 2023-04-26 PROCEDURE — 1159F PR MEDICATION LIST DOCUMENTED IN MEDICAL RECORD: ICD-10-PCS | Mod: CPTII,,, | Performed by: PODIATRIST

## 2023-04-26 PROCEDURE — 1160F RVW MEDS BY RX/DR IN RCRD: CPT | Mod: CPTII,,, | Performed by: PODIATRIST

## 2023-04-26 PROCEDURE — 1160F PR REVIEW ALL MEDS BY PRESCRIBER/CLIN PHARMACIST DOCUMENTED: ICD-10-PCS | Mod: CPTII,,, | Performed by: PODIATRIST

## 2023-04-26 PROCEDURE — 4010F PR ACE/ARB THEARPY RXD/TAKEN: ICD-10-PCS | Mod: CPTII,,, | Performed by: PODIATRIST

## 2023-04-26 PROCEDURE — 1159F MED LIST DOCD IN RCRD: CPT | Mod: CPTII,,, | Performed by: PODIATRIST

## 2023-04-26 PROCEDURE — 99214 OFFICE O/P EST MOD 30 MIN: CPT | Mod: PBBFAC | Performed by: PODIATRIST

## 2023-04-26 PROCEDURE — 3008F BODY MASS INDEX DOCD: CPT | Mod: CPTII,,, | Performed by: PODIATRIST

## 2023-04-26 PROCEDURE — 4010F ACE/ARB THERAPY RXD/TAKEN: CPT | Mod: CPTII,,, | Performed by: PODIATRIST

## 2023-04-26 PROCEDURE — 3079F DIAST BP 80-89 MM HG: CPT | Mod: CPTII,,, | Performed by: PODIATRIST

## 2023-04-26 PROCEDURE — 3044F HG A1C LEVEL LT 7.0%: CPT | Mod: CPTII,,, | Performed by: PODIATRIST

## 2023-04-26 PROCEDURE — 3075F PR MOST RECENT SYSTOLIC BLOOD PRESS GE 130-139MM HG: ICD-10-PCS | Mod: CPTII,,, | Performed by: PODIATRIST

## 2023-04-26 PROCEDURE — 99999 PR PBB SHADOW E&M-EST. PATIENT-LVL IV: CPT | Mod: PBBFAC,,, | Performed by: PODIATRIST

## 2023-04-26 PROCEDURE — 99999 PR PBB SHADOW E&M-EST. PATIENT-LVL IV: ICD-10-PCS | Mod: PBBFAC,,, | Performed by: PODIATRIST

## 2023-04-26 PROCEDURE — 99024 PR POST-OP FOLLOW-UP VISIT: ICD-10-PCS | Mod: ,,, | Performed by: PODIATRIST

## 2023-04-26 PROCEDURE — 3044F PR MOST RECENT HEMOGLOBIN A1C LEVEL <7.0%: ICD-10-PCS | Mod: CPTII,,, | Performed by: PODIATRIST

## 2023-04-26 PROCEDURE — 3079F PR MOST RECENT DIASTOLIC BLOOD PRESSURE 80-89 MM HG: ICD-10-PCS | Mod: CPTII,,, | Performed by: PODIATRIST

## 2023-04-26 RX ORDER — OXYCODONE HYDROCHLORIDE 5 MG/1
5 TABLET ORAL EVERY 6 HOURS PRN
COMMUNITY
Start: 2023-04-11 | End: 2023-05-01

## 2023-04-26 NOTE — TELEPHONE ENCOUNTER
Spoke with Yaquelin delacruz [facility] and verified that patient is seen by them. Got fax number to send any orders.

## 2023-04-26 NOTE — PROGRESS NOTES
C3 nurse spoke with Juan Pablo Chang and Earlene for a TCC post hospital discharge follow up call. [facility]  on 5/1/23 @ 1120.

## 2023-04-27 ENCOUNTER — TELEPHONE (OUTPATIENT)
Dept: PODIATRY | Facility: CLINIC | Age: 63
End: 2023-04-27
Payer: MEDICAID

## 2023-04-27 LAB — BACTERIA SPEC ANAEROBE CULT: NORMAL

## 2023-04-27 NOTE — DISCHARGE SUMMARY
[facility]  Discharge Summary      Patient Name: Juan Pablo Chang  MRN: [id number]  JULIÁN: [id number]  Patient Class: IP- Inpatient  Admission Date: 4/21/2023  Hospital Length of Stay: 3 days  Discharge Date and Time: 4/24/2023  4:50 PM  Attending Physician: No att. providers found   Discharging Provider: Devin Baker MD  Primary Care Provider: EDNA Butler    Primary Care Team: Networked reference to record PCT     HPI:   63 y/o male with PMH of T2DM, neuropathy, HTN, HFpEF, recent right TMA due to abscess and possible early osteomyelitis who presents today with fever and redness to right foot. Patient was recently admitted 3/31-4/7 for right foot abscess and early osteo. He underwent right TMA with podiatry. Wound cultures grew staph sensitive to moxifloxacin. He was discharged to SNF but only stayed 3 days. It is unclear if he has been taking abx. He states for past 2-3 days his appetite has been decreased. He has not noticed any pain in the foot or drainage. No NV. No cough, SOB, Weakness. Brother brought patient in today because he was having fever.      * No surgery found *      Hospital Course:   Patient admitted for sepsis w/ STERLING on CKD 2/2 Ecoli bacteremia caused by right foot infection in the setting of recent TMA. Patient was non-adherent to abx regimen and to care plan in outpatient setting. Given IVF and IV abx. STERLING resolved. Podiatry evaluated patient and recommended no surgical intervention. Once we had speciation we placed patient on appropriate IV abx since organism was MDRO. Patient also placed on cipro for pseudomonas growing in wound culture. PICC was placed. Home health set up prior to discharge. Patient was provided discharge instructions and return precautions.       Goals of Care Treatment Preferences:  Code Status: Full Code      Consults:     ID  * Sepsis  This patient does have evidence of infective focus  My overall impression is sepsis.  Source:  Skin and Soft Tissue (location Right foot surgical site)  Antibiotics given-   Antibiotics (72h ago, onward)    Start     Stop Route Frequency Ordered    04/24/23 1215  cefTRIAXone (ROCEPHIN) 2 g in dextrose 5 % in water (D5W) 5 % 50 mL IVPB (MB+)         -- IV Every 24 hours (non-standard times) 04/24/23 1104    04/22/23 0900  mupirocin 2 % ointment         04/27 0859 Nasl 2 times daily 04/22/23 0726    04/21/23 1730  clindamycin in D5W 600 mg/50 mL IVPB 600 mg         -- IV Every 8 hours (non-standard times) 04/21/23 1616        Latest lactate reviewed-  Recent Labs   Lab 04/21/23  1448 04/21/23  1825   LACTATE 1.7 0.8     Organ dysfunction indicated by Acute kidney injury    Fluid challenge Ideal Body Weight- The patient's ideal body weight is Ideal body weight: 68.4 kg (150 lb 12.7 oz) which will be used to calculate fluid bolus of 30 ml/kg for treatment of septic shock.      Post- resuscitation assessment No - Post resuscitation assessment not needed       Will Not start Pressors- Levophed for MAP of 65  Source control achieved by: IV abx, Podiatry consulted- appreciate recommendations  CT foot w/o contrast completed and  Results reviewed    Microbiology Results (last 7 days)     Procedure Component Value Units Date/Time    Blood culture [[id number]]  (Abnormal)  (Susceptibility) Collected: 04/21/23 1449    Order Status: Completed Specimen: Blood from Peripheral, Antecubital, Right Updated: 04/24/23 1313     Blood Culture, Routine Gram stain janice bottle: Gram negative rods      Results called to and read back by: Li Julian LPN 04/22/2023  08:58      ESCHERICHIA COLI    Narrative:      Specimen # 1    Aerobic culture [[id number]]  (Abnormal) Collected: 04/21/23 1622    Order Status: Completed Specimen: Wound from Foot, Right Updated: 04/24/23 0729     Aerobic Bacterial Culture GRAM NEGATIVE PIEDAD  Many  Identification and susceptibility pending      Blood culture [[id number]] Collected: 04/21/23 1544    Order Status:  Completed Specimen: Blood Updated: 04/23/23 2312     Blood Culture, Routine No Growth to date      No Growth to date      No Growth to date    Narrative:      Specimen # 2    Culture, Anaerobe [[id number]] Collected: 04/21/23 1623    Order Status: Completed Specimen: Wound from Foot, Right Updated: 04/23/23 0818     Anaerobic Culture Culture in progress    Rapid Organism ID by PCR (from Blood culture) [[id number]]  (Abnormal) Collected: 04/21/23 1449    Order Status: Completed Updated: 04/22/23 1026     Enterococcus faecalis Not Detected     Enterococcus faecium Not Detected     Listeria Monocytogenes Not Detected     Staphylococcus spp. Not Detected     Staphylococcus aureus Not Detected     Staphylococcus epidermidis Not Detected     Staphylococcus lugdunensis Not Detected     Streptococcus species Not Detected     Streptococcus agalactiae Not Detected     Streptococcus pneumoniae Not Detected     Streptococcus pyogenes Not Detected     Acinetobacter calcoaceticus/baumannii complex Not Detected     Bacteroides fragilis Not Detected     Enterobacerales See species for ID     Enterobacter cloacae complex Not Detected     Escherichia Detected     Klebsiella aerogenes Not Detected     Klebsiella oxytoca Not Detected     Klebsiella pneumoniae group Not Detected     Proteus Not Detected     Salmonella sp Not Detected     Serratia marcescens Not Detected     Haemophilus influenzae Not Detected     Neisseria meningtidis Not Detected     Pseudomonas aeruginosa Not Detected     Stenotrophomonas maltophilia Not Detected     Candida albicans Not Detected     Candida auris Not Detected     Candida glabrata Not Detected     Candida krusei Not Detected     Candida parapsilosis Not Detected     Candida tropicalis Not Detected     Cryptococcus neoformans/gattii Not Detected     CTX-M (ESBL ) Not Detected     IMP (Carbapenem resistant) Not Detected     KPC resistance gene (Carbapenem resistant) Not Detected     mcr-1  Not  Detected     mec A/C  Not Detected     mec A/C and MREJ (MRSA) gene Not Detected     NDM (Carbapenem resistant) Not Detected     OXA-48-like (Carbapenem resistant) Not Detected     van A/B (VRE gene) Not Detected     VIM (Carbapenem resistant) Not Detected    Narrative:      Specimen # 1    Influenza A & B by Molecular [[id number]] Collected: 04/21/23 1623    Order Status: Completed Specimen: Nasopharyngeal Swab Updated: 04/21/23 1648     Influenza A, Molecular Negative     Influenza B, Molecular Negative     Flu A & B Source Nasal Swab        Rocephin 2g daily 4 weeks  Will discuss other abx with podiatry but likey will use cipro for MSSA that grew on wound culture earlier this month  PICC consult placed      Final Active Diagnoses:    Diagnosis Date Noted POA    PRINCIPAL PROBLEM:  Sepsis [A41.9] 04/21/2023 Yes    Noncompliance with treatment [Z91.199] 04/22/2023 Not Applicable    Primary hypertension [I10] 04/21/2023 Yes    Chronic diastolic heart failure [I50.32] 04/21/2023 Yes    Hyperkalemia [E87.5] 04/21/2023 Yes    STERLING (acute kidney injury) [N17.9] 04/01/2023 Yes    Type 2 diabetes mellitus with hypoglycemia without coma, without long-term current use of insulin [E11.649] 04/01/2023 Yes      Problems Resolved During this Admission:       Discharged Condition: good    Disposition: Home or Self Care    Follow Up:   Follow-up Information     EDNA Butler. Go on 5/1/2023.    Specialty: Family Medicine  Why: appointment Monday May 1st at 11:20am for hospital follow up  Contact information:  [address] [place] [address] [place] [id number] [phone]             Orion Donaldson DPM. Go on 4/26/2023.    Specialty: Podiatry  Why: appointment Wednesday April 26th at 3:15pm for podiatry follow up  Contact information:  [address] [facility] [place] [address] [phone]             [facility] Follow up.    Specialties:  Home Health Services, Home Therapy Services, Home Living Aide Services  Why: will provide home health services; CALL THEM ONCE HE MOVES TO [place] TO LET THEM KNOW THE NEW ADDRESS OF WHERE HE WILL BE LIVING  Contact information:  [facility] [address]  [phone]           [facility] Follow up.    Why: will provide IV antibiotics; CALL THEM ONCE HE MOVES TO [place] TO LET THEM KNOW THE NEW ADDRESS OF WHERE HE WILL BE LIVING SO THE MEDICATIONS CAN BE DELIVERED  Contact information:  Kristie Hernadez.  [facility] [address]  [phone]                     Patient Instructions:      Ambulatory referral/consult to Home Health   Standing Status: Future   Referral Priority: Routine Referral Type: Home Health   Referral Reason: Specialty Services Required   Requested Specialty: Home Health Services   Number of Visits Requested: 1     Diet diabetic     Diet Cardiac     Notify your health care provider if you experience any of the following:  temperature >100.4     Notify your health care provider if you experience any of the following:  severe uncontrolled pain     Notify your health care provider if you experience any of the following:  redness, tenderness, or signs of infection (pain, swelling, redness, odor or green/yellow discharge around incision site)     Activity as tolerated       Significant Diagnostic Studies:   Recent Results (from the past 168 hour(s))   CBC auto differential    Collection Time: 04/21/23  2:48 PM   Result Value Ref Range    WBC 18.79 (H) 3.90 - 12.70 K/uL    RBC 3.86 (L) 4.60 - 6.20 M/uL    Hemoglobin 10.9 (L) 14.0 - 18.0 g/dL    Hematocrit 35.0 (L) 40.0 - 54.0 %    MCV 91 82 - 98 fL    MCH 28.2 27.0 - 31.0 pg    MCHC 31.1 (L) 32.0 - 36.0 g/dL    RDW 14.1 11.5 - 14.5 %    Platelets 349 150 - 450 K/uL    MPV 10.9 9.2 - 12.9 fL    Immature Granulocytes CANCELED %    Immature Grans (Abs) CANCELED K/uL    nRBC 0 0 /100 WBC    Gran % 92.0 (H) 38.0 - 73.0 %     Lymph % 1.0 (L) 18.0 - 48.0 %    Mono % 5.0 4.0 - 15.0 %    Eosinophil % 2.0 0.0 - 8.0 %    Basophil % 0.0 0.0 - 1.9 %    Differential Method Manual    Comprehensive metabolic panel    Collection Time: 04/21/23  2:48 PM   Result Value Ref Range    Sodium 135 (L) 136 - 145 mmol/L    Potassium 5.7 (H) 3.5 - 5.1 mmol/L    Chloride 101 95 - 110 mmol/L    CO2 20 (L) 23 - 29 mmol/L    Glucose 153 (H) 70 - 110 mg/dL    BUN 38 (H) 8 - 23 mg/dL    Creatinine 2.5 (H) 0.5 - 1.4 mg/dL    Calcium 9.8 8.7 - 10.5 mg/dL    Total Protein 8.1 6.0 - 8.4 g/dL    Albumin 3.2 (L) 3.5 - 5.2 g/dL    Total Bilirubin 0.7 0.1 - 1.0 mg/dL    Alkaline Phosphatase 59 55 - 135 U/L    AST 25 10 - 40 U/L    ALT 19 10 - 44 U/L    Anion Gap 14 8 - 16 mmol/L    eGFR 28.0 (A) >60 mL/min/1.73 m^2   Lactic acid, plasma    Collection Time: 04/21/23  2:48 PM   Result Value Ref Range    Lactate (Lactic Acid) 1.7 0.5 - 2.2 mmol/L   Blood culture    Collection Time: 04/21/23  2:49 PM    Specimen: Peripheral, Antecubital, Right; Blood   Result Value Ref Range    Blood Culture, Routine Gram stain janice bottle: Gram negative rods     Blood Culture, Routine       Results called to and read back by: Li Julian LPN 04/22/2023  08:58    Blood Culture, Routine ESCHERICHIA COLI (A)        Susceptibility    Escherichia coli - CULTURE, BLOOD     Amp/Sulbactam 16/8 Intermediate mcg/mL     Amikacin 16 Sensitive mcg/mL     Ampicillin >16 Resistant mcg/mL     Amox/K Clav'ate 16/8 Intermediate mcg/mL     Ceftriaxone <=1 Sensitive mcg/mL     Cefazolin <=2 Sensitive mcg/mL     Ciprofloxacin >2 Resistant mcg/mL     Cefepime <=2 Sensitive mcg/mL     Ertapenem <=0.5 Sensitive mcg/mL     Gentamicin 2 Sensitive mcg/mL     Levofloxacin >4 Resistant mcg/mL     Meropenem <=1 Sensitive mcg/mL     Piperacillin/Tazo <=8 Sensitive mcg/mL     Trimeth/Sulfa <=0.5/9.5 Sensitive mcg/mL     Tetracycline >8 Resistant mcg/mL     Tobramycin >8 Resistant mcg/mL   Rapid Organism ID by PCR (from  Blood culture)    Collection Time: 04/21/23  2:49 PM   Result Value Ref Range    Enterococcus faecalis Not Detected Not Detected    Enterococcus faecium Not Detected Not Detected    Listeria Monocytogenes Not Detected Not Detected    Staphylococcus spp. Not Detected Not Detected    Staphylococcus aureus Not Detected Not Detected    Staphylococcus epidermidis Not Detected Not Detected    Staphylococcus lugdunensis Not Detected Not Detected    Streptococcus species Not Detected Not Detected    Streptococcus agalactiae Not Detected Not Detected    Streptococcus pneumoniae Not Detected Not Detected    Streptococcus pyogenes Not Detected Not Detected    Acinetobacter calcoaceticus/baumannii complex Not Detected Not Detected    Bacteroides fragilis Not Detected Not Detected    Enterobacerales See species for ID (A) Not Detected    Enterobacter cloacae complex Not Detected Not Detected    Escherichia Detected (A) Not Detected    Klebsiella aerogenes Not Detected Not Detected    Klebsiella oxytoca Not Detected Not Detected    Klebsiella pneumoniae group Not Detected Not Detected    Proteus Not Detected Not Detected    Salmonella sp Not Detected Not Detected    Serratia marcescens Not Detected Not Detected    Haemophilus influenzae Not Detected Not Detected    Neisseria meningtidis Not Detected Not Detected    Pseudomonas aeruginosa Not Detected Not Detected    Stenotrophomonas maltophilia Not Detected Not Detected    Candida albicans Not Detected Not Detected    Candida auris Not Detected Not Detected    Candida glabrata Not Detected Not Detected    Candida krusei Not Detected Not Detected    Candida parapsilosis Not Detected Not Detected    Candida tropicalis Not Detected Not Detected    Cryptococcus neoformans/gattii Not Detected Not Detected    CTX-M (ESBL ) Not Detected Not Detected    IMP (Carbapenem resistant) Not Detected Not Detected    KPC resistance gene (Carbapenem resistant) Not Detected Not Detected     mcr-1  Not Detected Not Detected    mec A/C  Not Detected Not Detected    mec A/C and MREJ (MRSA) gene Not Detected Not Detected    NDM (Carbapenem resistant) Not Detected Not Detected    OXA-48-like (Carbapenem resistant) Not Detected Not Detected    van A/B (VRE gene) Not Detected Not Detected    VIM (Carbapenem resistant) Not Detected Not Detected   Blood culture    Collection Time: 04/21/23  3:44 PM    Specimen: Blood   Result Value Ref Range    Blood Culture, Routine No growth after 5 days.    Sedimentation rate    Collection Time: 04/21/23  3:44 PM   Result Value Ref Range    Sed Rate >90 (H) 0 - 10 mm/Hr   C-reactive protein    Collection Time: 04/21/23  3:44 PM   Result Value Ref Range    .7 (H) 0.0 - 8.2 mg/L   Aerobic culture    Collection Time: 04/21/23  4:22 PM    Specimen: Foot, Right; Wound   Result Value Ref Range    Aerobic Bacterial Culture ESCHERICHIA COLI  Many   (A)     Aerobic Bacterial Culture PSEUDOMONAS AERUGINOSA  Moderate   (A)        Susceptibility    Escherichia coli - CULTURE, AEROBIC  (SPECIFY SOURCE)     Amp/Sulbactam 16/8 Intermediate mcg/mL     Amikacin <=16 Sensitive mcg/mL     Ampicillin >16 Resistant mcg/mL     Amox/K Clav'ate 16/8 Intermediate mcg/mL     Ceftriaxone <=1 Sensitive mcg/mL     Cefazolin <=2 Sensitive mcg/mL     Ciprofloxacin >2 Resistant mcg/mL     Cefepime <=2 Sensitive mcg/mL     Ertapenem <=0.5 Sensitive mcg/mL     Gentamicin <=4 Sensitive mcg/mL     Levofloxacin >4 Resistant mcg/mL     Meropenem <=1 Sensitive mcg/mL     Piperacillin/Tazo <=16 Sensitive mcg/mL     Trimeth/Sulfa <=2/38 Sensitive mcg/mL     Tetracycline >8 Resistant mcg/mL     Tobramycin >8 Resistant mcg/mL    Pseudomonas aeruginosa - CULTURE, AEROBIC  (SPECIFY SOURCE)     Amikacin <=16 Sensitive mcg/mL     Ciprofloxacin <=1 Sensitive mcg/mL     Cefepime <=2 Sensitive mcg/mL     Gentamicin 8 Intermediate mcg/mL     Levofloxacin <=2 Sensitive mcg/mL     Meropenem <=1 Sensitive mcg/mL      Piperacillin/Tazo <=16 Sensitive mcg/mL     Tobramycin <=4 Sensitive mcg/mL   COVID-19 Rapid Screening    Collection Time: 04/21/23  4:23 PM   Result Value Ref Range    SARS-CoV-2 RNA, Amplification, Qual Negative Negative   Influenza A & B by Molecular    Collection Time: 04/21/23  4:23 PM    Specimen: Nasopharyngeal Swab   Result Value Ref Range    Influenza A, Molecular Negative Negative    Influenza B, Molecular Negative Negative    Flu A & B Source Nasal Swab    Culture, Anaerobe    Collection Time: 04/21/23  4:23 PM    Specimen: Foot, Right; Wound   Result Value Ref Range    Anaerobic Culture No anaerobes isolated    Basic metabolic panel    Collection Time: 04/21/23  6:25 PM   Result Value Ref Range    Sodium 136 136 - 145 mmol/L    Potassium 5.1 3.5 - 5.1 mmol/L    Chloride 107 95 - 110 mmol/L    CO2 18 (L) 23 - 29 mmol/L    Glucose 148 (H) 70 - 110 mg/dL    BUN 36 (H) 8 - 23 mg/dL    Creatinine 2.1 (H) 0.5 - 1.4 mg/dL    Calcium 8.4 (L) 8.7 - 10.5 mg/dL    Anion Gap 11 8 - 16 mmol/L    eGFR 34.5 (A) >60 mL/min/1.73 m^2   Lactic acid, plasma    Collection Time: 04/21/23  6:25 PM   Result Value Ref Range    Lactate (Lactic Acid) 0.8 0.5 - 2.2 mmol/L   POCT glucose    Collection Time: 04/21/23  8:46 PM   Result Value Ref Range    POCT Glucose 274 (H) 70 - 110 mg/dL   Urinalysis, Reflex to Urine Culture Urine, Clean Catch    Collection Time: 04/21/23  9:32 PM    Specimen: Urine   Result Value Ref Range    Specimen UA Urine, Clean Catch     Color, UA Yellow Yellow, Straw, Dalila    Appearance, UA Clear Clear    pH, UA 6.0 5.0 - 8.0    Specific Gravity, UA 1.025 1.005 - 1.030    Protein, UA 1+ (A) Negative    Glucose, UA Negative Negative    Ketones, UA 1+ (A) Negative    Bilirubin (UA) Negative Negative    Occult Blood UA 1+ (A) Negative    Nitrite, UA Negative Negative    Urobilinogen, UA Negative Negative EU/dL    Leukocytes, UA Negative Negative   Urinalysis Microscopic    Collection Time: 04/21/23  9:32 PM    Result Value Ref Range    RBC, UA 13 (H) 0 - 4 /hpf    WBC, UA 4 0 - 5 /hpf    Bacteria Few (A) None-Occ /hpf    Squam Epithel, UA 2 /hpf    Hyaline Casts, UA 2 (A) 0-1/lpf /lpf    Granular Casts, UA 4 (A) None /lpf    Amorphous, UA Moderate None-Moderate    Microscopic Comment SEE COMMENT    CBC Auto Differential    Collection Time: 04/22/23  2:51 AM   Result Value Ref Range    WBC 10.12 3.90 - 12.70 K/uL    RBC 3.24 (L) 4.60 - 6.20 M/uL    Hemoglobin 9.1 (L) 14.0 - 18.0 g/dL    Hematocrit 28.3 (L) 40.0 - 54.0 %    MCV 87 82 - 98 fL    MCH 28.1 27.0 - 31.0 pg    MCHC 32.2 32.0 - 36.0 g/dL    RDW 14.0 11.5 - 14.5 %    Platelets 239 150 - 450 K/uL    MPV 10.2 9.2 - 12.9 fL    Immature Granulocytes 0.3 0.0 - 0.5 %    Gran # (ANC) 9.3 (H) 1.8 - 7.7 K/uL    Immature Grans (Abs) 0.03 0.00 - 0.04 K/uL    Lymph # 0.3 (L) 1.0 - 4.8 K/uL    Mono # 0.5 0.3 - 1.0 K/uL    Eos # 0.0 0.0 - 0.5 K/uL    Baso # 0.02 0.00 - 0.20 K/uL    nRBC 0 0 /100 WBC    Gran % 91.6 (H) 38.0 - 73.0 %    Lymph % 2.7 (L) 18.0 - 48.0 %    Mono % 5.1 4.0 - 15.0 %    Eosinophil % 0.1 0.0 - 8.0 %    Basophil % 0.2 0.0 - 1.9 %    Differential Method Automated    Comprehensive Metabolic Panel    Collection Time: 04/22/23  2:51 AM   Result Value Ref Range    Sodium 135 (L) 136 - 145 mmol/L    Potassium 5.1 3.5 - 5.1 mmol/L    Chloride 107 95 - 110 mmol/L    CO2 18 (L) 23 - 29 mmol/L    Glucose 165 (H) 70 - 110 mg/dL    BUN 32 (H) 8 - 23 mg/dL    Creatinine 1.8 (H) 0.5 - 1.4 mg/dL    Calcium 8.5 (L) 8.7 - 10.5 mg/dL    Total Protein 6.5 6.0 - 8.4 g/dL    Albumin 2.6 (L) 3.5 - 5.2 g/dL    Total Bilirubin 0.4 0.1 - 1.0 mg/dL    Alkaline Phosphatase 54 (L) 55 - 135 U/L    AST 28 10 - 40 U/L    ALT 20 10 - 44 U/L    Anion Gap 10 8 - 16 mmol/L    eGFR 41.5 (A) >60 mL/min/1.73 m^2   Magnesium    Collection Time: 04/22/23  2:51 AM   Result Value Ref Range    Magnesium 1.7 1.6 - 2.6 mg/dL   POCT glucose    Collection Time: 04/22/23  7:18 AM   Result Value Ref  Range    POCT Glucose 210 (H) 70 - 110 mg/dL   POCT glucose    Collection Time: 04/22/23 11:00 AM   Result Value Ref Range    POCT Glucose 262 (H) 70 - 110 mg/dL   POCT glucose    Collection Time: 04/22/23  3:58 PM   Result Value Ref Range    POCT Glucose 189 (H) 70 - 110 mg/dL   POCT glucose    Collection Time: 04/22/23  9:39 PM   Result Value Ref Range    POCT Glucose 212 (H) 70 - 110 mg/dL   CBC Auto Differential    Collection Time: 04/23/23  4:20 AM   Result Value Ref Range    WBC 5.97 3.90 - 12.70 K/uL    RBC 3.14 (L) 4.60 - 6.20 M/uL    Hemoglobin 8.9 (L) 14.0 - 18.0 g/dL    Hematocrit 27.3 (L) 40.0 - 54.0 %    MCV 87 82 - 98 fL    MCH 28.3 27.0 - 31.0 pg    MCHC 32.6 32.0 - 36.0 g/dL    RDW 13.8 11.5 - 14.5 %    Platelets 226 150 - 450 K/uL    MPV 11.3 9.2 - 12.9 fL    Immature Granulocytes 0.3 0.0 - 0.5 %    Gran # (ANC) 4.9 1.8 - 7.7 K/uL    Immature Grans (Abs) 0.02 0.00 - 0.04 K/uL    Lymph # 0.5 (L) 1.0 - 4.8 K/uL    Mono # 0.5 0.3 - 1.0 K/uL    Eos # 0.0 0.0 - 0.5 K/uL    Baso # 0.02 0.00 - 0.20 K/uL    nRBC 0 0 /100 WBC    Gran % 81.6 (H) 38.0 - 73.0 %    Lymph % 8.9 (L) 18.0 - 48.0 %    Mono % 8.4 4.0 - 15.0 %    Eosinophil % 0.5 0.0 - 8.0 %    Basophil % 0.3 0.0 - 1.9 %    Differential Method Automated    Comprehensive Metabolic Panel    Collection Time: 04/23/23  4:20 AM   Result Value Ref Range    Sodium 136 136 - 145 mmol/L    Potassium 4.3 3.5 - 5.1 mmol/L    Chloride 105 95 - 110 mmol/L    CO2 20 (L) 23 - 29 mmol/L    Glucose 158 (H) 70 - 110 mg/dL    BUN 21 8 - 23 mg/dL    Creatinine 1.6 (H) 0.5 - 1.4 mg/dL    Calcium 8.6 (L) 8.7 - 10.5 mg/dL    Total Protein 6.5 6.0 - 8.4 g/dL    Albumin 2.5 (L) 3.5 - 5.2 g/dL    Total Bilirubin 0.3 0.1 - 1.0 mg/dL    Alkaline Phosphatase 54 (L) 55 - 135 U/L    AST 41 (H) 10 - 40 U/L    ALT 29 10 - 44 U/L    Anion Gap 11 8 - 16 mmol/L    eGFR 47.8 (A) >60 mL/min/1.73 m^2   POCT glucose    Collection Time: 04/23/23  7:08 AM   Result Value Ref Range    POCT  Glucose 157 (H) 70 - 110 mg/dL   POCT glucose    Collection Time: 04/23/23 11:12 AM   Result Value Ref Range    POCT Glucose 200 (H) 70 - 110 mg/dL   POCT glucose    Collection Time: 04/23/23  4:18 PM   Result Value Ref Range    POCT Glucose 236 (H) 70 - 110 mg/dL   POCT glucose    Collection Time: 04/23/23  9:41 PM   Result Value Ref Range    POCT Glucose 186 (H) 70 - 110 mg/dL   CBC Auto Differential    Collection Time: 04/24/23  4:18 AM   Result Value Ref Range    WBC 5.65 3.90 - 12.70 K/uL    RBC 3.02 (L) 4.60 - 6.20 M/uL    Hemoglobin 8.5 (L) 14.0 - 18.0 g/dL    Hematocrit 25.9 (L) 40.0 - 54.0 %    MCV 86 82 - 98 fL    MCH 28.1 27.0 - 31.0 pg    MCHC 32.8 32.0 - 36.0 g/dL    RDW 13.9 11.5 - 14.5 %    Platelets 225 150 - 450 K/uL    MPV 11.4 9.2 - 12.9 fL    Immature Granulocytes 0.4 0.0 - 0.5 %    Gran # (ANC) 4.0 1.8 - 7.7 K/uL    Immature Grans (Abs) 0.02 0.00 - 0.04 K/uL    Lymph # 0.8 (L) 1.0 - 4.8 K/uL    Mono # 0.7 0.3 - 1.0 K/uL    Eos # 0.1 0.0 - 0.5 K/uL    Baso # 0.03 0.00 - 0.20 K/uL    nRBC 0 0 /100 WBC    Gran % 71.5 38.0 - 73.0 %    Lymph % 14.2 (L) 18.0 - 48.0 %    Mono % 12.0 4.0 - 15.0 %    Eosinophil % 1.4 0.0 - 8.0 %    Basophil % 0.5 0.0 - 1.9 %    Differential Method Automated    Comprehensive Metabolic Panel    Collection Time: 04/24/23  4:18 AM   Result Value Ref Range    Sodium 136 136 - 145 mmol/L    Potassium 4.1 3.5 - 5.1 mmol/L    Chloride 105 95 - 110 mmol/L    CO2 16 (L) 23 - 29 mmol/L    Glucose 139 (H) 70 - 110 mg/dL    BUN 16 8 - 23 mg/dL    Creatinine 1.3 0.5 - 1.4 mg/dL    Calcium 8.9 8.7 - 10.5 mg/dL    Total Protein 6.3 6.0 - 8.4 g/dL    Albumin 2.4 (L) 3.5 - 5.2 g/dL    Total Bilirubin 0.2 0.1 - 1.0 mg/dL    Alkaline Phosphatase 52 (L) 55 - 135 U/L    AST 28 10 - 40 U/L    ALT 31 10 - 44 U/L    Anion Gap 15 8 - 16 mmol/L    eGFR >60.0 >60 mL/min/1.73 m^2   POCT glucose    Collection Time: 04/24/23  7:29 AM   Result Value Ref Range    POCT Glucose 144 (H) 70 - 110 mg/dL    POCT glucose    Collection Time: 04/24/23 10:51 AM   Result Value Ref Range    POCT Glucose 181 (H) 70 - 110 mg/dL   POCT glucose    Collection Time: 04/24/23  3:25 PM   Result Value Ref Range    POCT Glucose 174 (H) 70 - 110 mg/dL         Pending Diagnostic Studies:     None         Medications:  Reconciled Home Medications:      Medication List      START taking these medications    cefTRIAXone 1 gram injection  Commonly known as: ROCEPHIN  Inject 2 g into the vein once daily. for 27 days        CONTINUE taking these medications    furosemide 20 MG tablet  Commonly known as: LASIX  Take 1 tablet (20 mg total) by mouth once daily.     gabapentin 600 MG tablet  Commonly known as: NEURONTIN  Take 0.5 tablets (300 mg total) by mouth 3 (three) times daily.     glipiZIDE 10 MG tablet  Commonly known as: GLUCOTROL  Take 10 mg by mouth 2 (two) times daily before meals.     lisinopriL 20 MG tablet  Commonly known as: PRINIVIL,ZESTRIL  Take 1 tablet (20 mg total) by mouth once daily.     metFORMIN 1000 MG tablet  Commonly known as: GLUCOPHAGE  Take 0.5 tablets (500 mg total) by mouth 2 (two) times daily with meals.     oxyCODONE 5 mg Cap  Commonly known as: OXY-IR  Take 5 mg by mouth as needed for Pain.        STOP taking these medications    clindamycin 300 MG capsule  Commonly known as: CLEOCIN        ASK your doctor about these medications    LEVEMIR FLEXPEN 100 unit/mL (3 mL) Inpn pen  Generic drug: insulin detemir U-100 (Levemir)  inject 20 unit by subcutaneous route  every evening            Indwelling Lines/Drains at time of discharge:   Lines/Drains/Airways     Peripherally Inserted Central Catheter Line  Duration           PICC Double Lumen 04/24/23 1526 right basilic 2 days                Time spent on the discharge of patient: 45 minutes         Devin Baker MD  Department of Hospital Medicine  [facility]

## 2023-04-27 NOTE — HOSPITAL COURSE
Patient admitted for sepsis w/ STERLING on CKD 2/2 Ecoli bacteremia caused by right foot infection in the setting of recent TMA. Patient was non-adherent to abx regimen and to care plan in outpatient setting. Given IVF and IV abx. STERLING resolved. Podiatry evaluated patient and recommended no surgical intervention. Once we had speciation we placed patient on appropriate IV abx since organism was MDRO. Patient also placed on cipro for pseudomonas growing in wound culture. PICC was placed. Home health set up prior to discharge. Patient was provided discharge instructions and return precautions.

## 2023-04-27 NOTE — TELEPHONE ENCOUNTER
Spoke with  nurse, Cher, and advised her that wound care orders were faxed yesterday late afternoon. Gave Cher the orders verbally. Advised her that dressing was applied yesterday for patient's visit with provider. Cher verbalized understanding and read back orders.    ----- Message from Ruby Valencia sent at 4/27/2023  1:46 PM CDT -----  Contact: Sable  Type:  Needs Medical Advice    Who Called: Sable with [facility]     Would the patient rather a call back or a response via [facility]? Call     Best Call Back Number: [phone]    Additional Information: Sable with [facility] would like to speak with the nurse in regards to wound care orders and need the orders faxed over. The fax number is [phone].

## 2023-04-29 NOTE — PROGRESS NOTES
"Juan Pablo Chang is a 62 y.o. male patient.   1. Amputation of foot    2. Type 2 diabetes mellitus with hypoglycemia without coma, without long-term current use of insulin    3. Comprehensive diabetic foot examination, type 2 DM, encounter for      Past Medical History:   Diagnosis Date    Diabetes mellitus     Diabetes mellitus, type 2     Hypertension     Neuropathy      No past surgical history pertinent negatives on file.  Scheduled Meds:  Continuous Infusions:  PRN Meds:    Review of patient's allergies indicates:  No Known Allergies  There are no hospital problems to display for this patient.    Blood pressure 137/82, pulse 90, resp. rate 16, height 5' 8" (1.727 m), weight 61.2 kg (135 lb).                                            Subjective  Objective:  Vital signs (most recent): Blood pressure 137/82, pulse 90, resp. rate 16, height 5' 8" (1.727 m), weight 61.2 kg (135 lb).  Assessment & Plan  Presents today for his initial outpatient postoperative evaluation following a transmetatarsal amputation right foot.  Patient was recently seen in the hospital for sepsis he states that he is taking the Cipro that was prescribed to him and he does have care in home home health as he is receiving IV Rocephin daily.  Patient's dressing was partially coming off the patient is not keeping the area dry and cleaning obviously not staying off of the area I have advised the patient he must stay off of the area keep the dressing dry and intact in order for this to heal properly he is going to continue taking his Cipro as directed I did send orders to care in home home health agency to start changing the dressing 2-3 times per week applying Betadine to the incision I do plan to follow up with the patient in 1 week.  I did explain to the patient multiple times the importance of compliance.  This note was created using [employer] voice recognition software that occasionally misinterpreted phrases or words.   Orion Donaldson, " TONI  4/29/2023

## 2023-05-01 ENCOUNTER — TELEPHONE (OUTPATIENT)
Dept: PODIATRY | Facility: CLINIC | Age: 63
End: 2023-05-01
Payer: MEDICAID

## 2023-05-01 RX ORDER — OXYCODONE HYDROCHLORIDE 5 MG/1
5 TABLET ORAL EVERY 12 HOURS PRN
Qty: 30 TABLET | Refills: 0 | Status: SHIPPED | OUTPATIENT
Start: 2023-05-01 | End: 2023-05-20

## 2023-05-03 ENCOUNTER — OFFICE VISIT (OUTPATIENT)
Dept: PODIATRY | Facility: CLINIC | Age: 63
End: 2023-05-03
Payer: MEDICAID

## 2023-05-03 ENCOUNTER — TELEPHONE (OUTPATIENT)
Dept: PODIATRY | Facility: CLINIC | Age: 63
End: 2023-05-03
Payer: MEDICAID

## 2023-05-03 VITALS
DIASTOLIC BLOOD PRESSURE: 60 MMHG | HEIGHT: 68 IN | WEIGHT: 135 LBS | RESPIRATION RATE: 16 BRPM | BODY MASS INDEX: 20.46 KG/M2 | HEART RATE: 103 BPM | SYSTOLIC BLOOD PRESSURE: 97 MMHG

## 2023-05-03 DIAGNOSIS — S98.919A AMPUTATION OF FOOT: Primary | ICD-10-CM

## 2023-05-03 PROCEDURE — 1160F PR REVIEW ALL MEDS BY PRESCRIBER/CLIN PHARMACIST DOCUMENTED: ICD-10-PCS | Mod: CPTII,,, | Performed by: PODIATRIST

## 2023-05-03 PROCEDURE — 3074F SYST BP LT 130 MM HG: CPT | Mod: CPTII,,, | Performed by: PODIATRIST

## 2023-05-03 PROCEDURE — 4010F ACE/ARB THERAPY RXD/TAKEN: CPT | Mod: CPTII,,, | Performed by: PODIATRIST

## 2023-05-03 PROCEDURE — 3044F HG A1C LEVEL LT 7.0%: CPT | Mod: CPTII,,, | Performed by: PODIATRIST

## 2023-05-03 PROCEDURE — 4010F PR ACE/ARB THEARPY RXD/TAKEN: ICD-10-PCS | Mod: CPTII,,, | Performed by: PODIATRIST

## 2023-05-03 PROCEDURE — 1159F MED LIST DOCD IN RCRD: CPT | Mod: CPTII,,, | Performed by: PODIATRIST

## 2023-05-03 PROCEDURE — 99214 OFFICE O/P EST MOD 30 MIN: CPT | Mod: PBBFAC | Performed by: PODIATRIST

## 2023-05-03 PROCEDURE — 1160F RVW MEDS BY RX/DR IN RCRD: CPT | Mod: CPTII,,, | Performed by: PODIATRIST

## 2023-05-03 PROCEDURE — 3008F BODY MASS INDEX DOCD: CPT | Mod: CPTII,,, | Performed by: PODIATRIST

## 2023-05-03 PROCEDURE — 3078F DIAST BP <80 MM HG: CPT | Mod: CPTII,,, | Performed by: PODIATRIST

## 2023-05-03 PROCEDURE — 99999 PR PBB SHADOW E&M-EST. PATIENT-LVL IV: ICD-10-PCS | Mod: PBBFAC,,, | Performed by: PODIATRIST

## 2023-05-03 PROCEDURE — 99999 PR PBB SHADOW E&M-EST. PATIENT-LVL IV: CPT | Mod: PBBFAC,,, | Performed by: PODIATRIST

## 2023-05-03 PROCEDURE — 99024 POSTOP FOLLOW-UP VISIT: CPT | Mod: ,,, | Performed by: PODIATRIST

## 2023-05-03 PROCEDURE — 1159F PR MEDICATION LIST DOCUMENTED IN MEDICAL RECORD: ICD-10-PCS | Mod: CPTII,,, | Performed by: PODIATRIST

## 2023-05-03 PROCEDURE — 3078F PR MOST RECENT DIASTOLIC BLOOD PRESSURE < 80 MM HG: ICD-10-PCS | Mod: CPTII,,, | Performed by: PODIATRIST

## 2023-05-03 PROCEDURE — 99024 PR POST-OP FOLLOW-UP VISIT: ICD-10-PCS | Mod: ,,, | Performed by: PODIATRIST

## 2023-05-03 PROCEDURE — 3044F PR MOST RECENT HEMOGLOBIN A1C LEVEL <7.0%: ICD-10-PCS | Mod: CPTII,,, | Performed by: PODIATRIST

## 2023-05-03 PROCEDURE — 3008F PR BODY MASS INDEX (BMI) DOCUMENTED: ICD-10-PCS | Mod: CPTII,,, | Performed by: PODIATRIST

## 2023-05-03 PROCEDURE — 3074F PR MOST RECENT SYSTOLIC BLOOD PRESSURE < 130 MM HG: ICD-10-PCS | Mod: CPTII,,, | Performed by: PODIATRIST

## 2023-05-03 RX ORDER — LISINOPRIL 40 MG/1
40 TABLET ORAL
COMMUNITY
Start: 2023-01-18 | End: 2023-05-03

## 2023-05-03 RX ORDER — GABAPENTIN 800 MG/1
800 TABLET ORAL 3 TIMES DAILY
COMMUNITY
Start: 2023-01-18 | End: 2023-05-03

## 2023-05-03 RX ORDER — ROSUVASTATIN CALCIUM 20 MG/1
TABLET, COATED ORAL DAILY
COMMUNITY
Start: 2023-01-19 | End: 2023-08-16

## 2023-05-03 NOTE — TELEPHONE ENCOUNTER
Spoke with Jammie from [facility]  to let her know the dressing is to be changed as ordered. Informed her patient was in today did not look like dressing has been changed as ordered. Informed her that the patient stated they have not been changing dressing to foot only to pic line. Stressed this needs to be done as ordered, understanding verbalized.

## 2023-05-04 ENCOUNTER — TELEPHONE (OUTPATIENT)
Dept: PODIATRY | Facility: CLINIC | Age: 63
End: 2023-05-04
Payer: MEDICAID

## 2023-05-04 NOTE — TELEPHONE ENCOUNTER
Spoke with JOSE Rodarte, stated nursing notes show patient refused dressing to be changed only wanted podiatrist to change. Asked that the order be followed per Dr. Donaldson, understanding verbalized.

## 2023-05-04 NOTE — TELEPHONE ENCOUNTER
Spoke with patient he stated he never refused dressing to be changed. Stated he was never asked to have dressing changed. Instructed he will need to have nurse change dressing, understanding verbalized.

## 2023-05-06 NOTE — PROGRESS NOTES
"Juan Pablo Chang is a 62 y.o. male patient.   1. Amputation of foot      Past Medical History:   Diagnosis Date    Diabetes mellitus     Diabetes mellitus, type 2     Hypertension     Neuropathy      No past surgical history pertinent negatives on file.  Scheduled Meds:  Continuous Infusions:  PRN Meds:    Review of patient's allergies indicates:  No Known Allergies  There are no hospital problems to display for this patient.    Blood pressure 97/60, pulse 103, resp. rate 16, height 5' 8" (1.727 m), weight 61.2 kg (135 lb).                                                                                      Subjective  Objective:  Vital signs (most recent): Blood pressure 97/60, pulse 103, resp. rate 16, height 5' 8" (1.727 m), weight 61.2 kg (135 lb).  Assessment & Plan  Presents today for his initial outpatient postoperative evaluation following a transmetatarsal amputation right foot.  Patient is currently taking Cipro he had a previous partial amputation right foot I had sent orders to care in home home health to start applying Betadine silver alginate and changing the dressing 2 times per week however the patient states they came to change his PICC line dressing but did not change the dressing on his foot so the dressing on the right foot has not been changed in a week.  We did investigate the home health agency is claiming the patient refused the dressing change the patient is claiming they never asked to change the dressing ultimately this dressing needs to be changed at least twice a week he does have severe maceration wetness along the lateral incision area right which is putting him at significant risk for nonhealing delayed healing or the area opening up as well as subsequent infection.  Patient has enough Cipro to last him until May 19th I do plan to follow up with him in 1 week the area was cleaned painted with Betadine silver alginate new well-padded thick protective dressing was applied patient needs to " stay off of the foot as much as possible keep the dressing dry and intact.  This note was created using [employer] voice recognition software that occasionally misinterpreted phrases or words.   Orion Donaldson DPM  5/6/2023

## 2023-05-10 ENCOUNTER — HOSPITAL ENCOUNTER (OUTPATIENT)
Dept: RADIOLOGY | Facility: HOSPITAL | Age: 63
Discharge: HOME | End: 2023-05-10
Attending: PODIATRIST
Payer: MEDICAID

## 2023-05-10 ENCOUNTER — OFFICE VISIT (OUTPATIENT)
Dept: PODIATRY | Facility: CLINIC | Age: 63
End: 2023-05-10
Payer: MEDICAID

## 2023-05-10 VITALS
HEIGHT: 68 IN | WEIGHT: 135 LBS | SYSTOLIC BLOOD PRESSURE: 115 MMHG | DIASTOLIC BLOOD PRESSURE: 65 MMHG | BODY MASS INDEX: 20.46 KG/M2 | HEART RATE: 96 BPM

## 2023-05-10 DIAGNOSIS — S98.919A AMPUTATION OF FOOT: ICD-10-CM

## 2023-05-10 DIAGNOSIS — S98.919A AMPUTATION OF FOOT: Primary | ICD-10-CM

## 2023-05-10 DIAGNOSIS — E11.649 TYPE 2 DIABETES MELLITUS WITH HYPOGLYCEMIA WITHOUT COMA, WITHOUT LONG-TERM CURRENT USE OF INSULIN: ICD-10-CM

## 2023-05-10 DIAGNOSIS — E11.9 COMPREHENSIVE DIABETIC FOOT EXAMINATION, TYPE 2 DM, ENCOUNTER FOR: ICD-10-CM

## 2023-05-10 PROCEDURE — 4010F ACE/ARB THERAPY RXD/TAKEN: CPT | Mod: CPTII,,, | Performed by: PODIATRIST

## 2023-05-10 PROCEDURE — 73630 X-RAY EXAM OF FOOT: CPT | Mod: TC,RT

## 2023-05-10 PROCEDURE — 3078F DIAST BP <80 MM HG: CPT | Mod: CPTII,,, | Performed by: PODIATRIST

## 2023-05-10 PROCEDURE — 3074F SYST BP LT 130 MM HG: CPT | Mod: CPTII,,, | Performed by: PODIATRIST

## 2023-05-10 PROCEDURE — 73630 XR FOOT COMPLETE 3 VIEW RIGHT: ICD-10-PCS | Mod: 26,RT,, | Performed by: RADIOLOGY

## 2023-05-10 PROCEDURE — 3078F PR MOST RECENT DIASTOLIC BLOOD PRESSURE < 80 MM HG: ICD-10-PCS | Mod: CPTII,,, | Performed by: PODIATRIST

## 2023-05-10 PROCEDURE — 1159F PR MEDICATION LIST DOCUMENTED IN MEDICAL RECORD: ICD-10-PCS | Mod: CPTII,,, | Performed by: PODIATRIST

## 2023-05-10 PROCEDURE — 3044F HG A1C LEVEL LT 7.0%: CPT | Mod: CPTII,,, | Performed by: PODIATRIST

## 2023-05-10 PROCEDURE — 99999 PR PBB SHADOW E&M-EST. PATIENT-LVL IV: ICD-10-PCS | Mod: PBBFAC,,, | Performed by: PODIATRIST

## 2023-05-10 PROCEDURE — 3044F PR MOST RECENT HEMOGLOBIN A1C LEVEL <7.0%: ICD-10-PCS | Mod: CPTII,,, | Performed by: PODIATRIST

## 2023-05-10 PROCEDURE — 1159F MED LIST DOCD IN RCRD: CPT | Mod: CPTII,,, | Performed by: PODIATRIST

## 2023-05-10 PROCEDURE — 1160F RVW MEDS BY RX/DR IN RCRD: CPT | Mod: CPTII,,, | Performed by: PODIATRIST

## 2023-05-10 PROCEDURE — 99024 PR POST-OP FOLLOW-UP VISIT: ICD-10-PCS | Mod: ,,, | Performed by: PODIATRIST

## 2023-05-10 PROCEDURE — 73630 X-RAY EXAM OF FOOT: CPT | Mod: 26,RT,, | Performed by: RADIOLOGY

## 2023-05-10 PROCEDURE — 1160F PR REVIEW ALL MEDS BY PRESCRIBER/CLIN PHARMACIST DOCUMENTED: ICD-10-PCS | Mod: CPTII,,, | Performed by: PODIATRIST

## 2023-05-10 PROCEDURE — 99214 OFFICE O/P EST MOD 30 MIN: CPT | Mod: PBBFAC | Performed by: PODIATRIST

## 2023-05-10 PROCEDURE — 99999 PR PBB SHADOW E&M-EST. PATIENT-LVL IV: CPT | Mod: PBBFAC,,, | Performed by: PODIATRIST

## 2023-05-10 PROCEDURE — 99024 POSTOP FOLLOW-UP VISIT: CPT | Mod: ,,, | Performed by: PODIATRIST

## 2023-05-10 PROCEDURE — 3008F PR BODY MASS INDEX (BMI) DOCUMENTED: ICD-10-PCS | Mod: CPTII,,, | Performed by: PODIATRIST

## 2023-05-10 PROCEDURE — 3074F PR MOST RECENT SYSTOLIC BLOOD PRESSURE < 130 MM HG: ICD-10-PCS | Mod: CPTII,,, | Performed by: PODIATRIST

## 2023-05-10 PROCEDURE — 3008F BODY MASS INDEX DOCD: CPT | Mod: CPTII,,, | Performed by: PODIATRIST

## 2023-05-10 PROCEDURE — 4010F PR ACE/ARB THEARPY RXD/TAKEN: ICD-10-PCS | Mod: CPTII,,, | Performed by: PODIATRIST

## 2023-05-10 RX ORDER — GABAPENTIN 800 MG/1
800 TABLET ORAL 3 TIMES DAILY
COMMUNITY
Start: 2023-05-06

## 2023-05-13 NOTE — PROGRESS NOTES
"Juan Pablo Chang is a 62 y.o. male patient.   1. Amputation of foot    2. Type 2 diabetes mellitus with hypoglycemia without coma, without long-term current use of insulin    3. Comprehensive diabetic foot examination, type 2 DM, encounter for      Past Medical History:   Diagnosis Date    Diabetes mellitus     Diabetes mellitus, type 2     Hypertension     Neuropathy      No past surgical history pertinent negatives on file.  Scheduled Meds:  Continuous Infusions:  PRN Meds:    Review of patient's allergies indicates:  No Known Allergies  There are no hospital problems to display for this patient.    Blood pressure 115/65, pulse 96, height 5' 8" (1.727 m), weight 61.2 kg (135 lb).                                                                                            Subjective  Objective:  Vital signs (most recent): Blood pressure 115/65, pulse 96, height 5' 8" (1.727 m), weight 61.2 kg (135 lb).  Assessment & Plan  Presents today for his initial outpatient postoperative evaluation following a transmetatarsal amputation right foot.  Patient is currently taking Cipro he has enough Cipro to last him until May 19th he is also receiving IV Rocephin.  Patient states home Health did change the dressing they are going to change the dressing 2-3 times per week as instructed the incision looks much better it has improved considerably there is less maceration as comparison to previous evaluation.  Betadine was applied as was silver alginate a well-padded thick protective dressing was applied I plan to follow up with the patient when week he has been advised to contact us with any problems questions or concerns he really needs to stay off of the foot as much as possible to give it the best chance to heal.  This note was created using [employer] voice recognition software that occasionally misinterpreted phrases or words.   Orion Donaldson DPM  5/13/2023          "

## 2023-05-17 ENCOUNTER — OFFICE VISIT (OUTPATIENT)
Dept: PODIATRY | Facility: CLINIC | Age: 63
End: 2023-05-17
Payer: MEDICAID

## 2023-05-17 VITALS
WEIGHT: 135.31 LBS | DIASTOLIC BLOOD PRESSURE: 70 MMHG | SYSTOLIC BLOOD PRESSURE: 125 MMHG | HEART RATE: 83 BPM | BODY MASS INDEX: 20.51 KG/M2 | RESPIRATION RATE: 16 BRPM | HEIGHT: 68 IN

## 2023-05-17 DIAGNOSIS — S98.919A AMPUTATION OF FOOT: Primary | ICD-10-CM

## 2023-05-17 PROCEDURE — 1160F PR REVIEW ALL MEDS BY PRESCRIBER/CLIN PHARMACIST DOCUMENTED: ICD-10-PCS | Mod: CPTII,,, | Performed by: PODIATRIST

## 2023-05-17 PROCEDURE — 3078F DIAST BP <80 MM HG: CPT | Mod: CPTII,,, | Performed by: PODIATRIST

## 2023-05-17 PROCEDURE — 3078F PR MOST RECENT DIASTOLIC BLOOD PRESSURE < 80 MM HG: ICD-10-PCS | Mod: CPTII,,, | Performed by: PODIATRIST

## 2023-05-17 PROCEDURE — 3074F SYST BP LT 130 MM HG: CPT | Mod: CPTII,,, | Performed by: PODIATRIST

## 2023-05-17 PROCEDURE — 3008F BODY MASS INDEX DOCD: CPT | Mod: CPTII,,, | Performed by: PODIATRIST

## 2023-05-17 PROCEDURE — 1159F MED LIST DOCD IN RCRD: CPT | Mod: CPTII,,, | Performed by: PODIATRIST

## 2023-05-17 PROCEDURE — 99999 PR PBB SHADOW E&M-EST. PATIENT-LVL IV: ICD-10-PCS | Mod: PBBFAC,,, | Performed by: PODIATRIST

## 2023-05-17 PROCEDURE — 99024 PR POST-OP FOLLOW-UP VISIT: ICD-10-PCS | Mod: ,,, | Performed by: PODIATRIST

## 2023-05-17 PROCEDURE — 99024 POSTOP FOLLOW-UP VISIT: CPT | Mod: ,,, | Performed by: PODIATRIST

## 2023-05-17 PROCEDURE — 4010F ACE/ARB THERAPY RXD/TAKEN: CPT | Mod: CPTII,,, | Performed by: PODIATRIST

## 2023-05-17 PROCEDURE — 99999 PR PBB SHADOW E&M-EST. PATIENT-LVL IV: CPT | Mod: PBBFAC,,, | Performed by: PODIATRIST

## 2023-05-17 PROCEDURE — 3008F PR BODY MASS INDEX (BMI) DOCUMENTED: ICD-10-PCS | Mod: CPTII,,, | Performed by: PODIATRIST

## 2023-05-17 PROCEDURE — 1159F PR MEDICATION LIST DOCUMENTED IN MEDICAL RECORD: ICD-10-PCS | Mod: CPTII,,, | Performed by: PODIATRIST

## 2023-05-17 PROCEDURE — 3044F PR MOST RECENT HEMOGLOBIN A1C LEVEL <7.0%: ICD-10-PCS | Mod: CPTII,,, | Performed by: PODIATRIST

## 2023-05-17 PROCEDURE — 4010F PR ACE/ARB THEARPY RXD/TAKEN: ICD-10-PCS | Mod: CPTII,,, | Performed by: PODIATRIST

## 2023-05-17 PROCEDURE — 1160F RVW MEDS BY RX/DR IN RCRD: CPT | Mod: CPTII,,, | Performed by: PODIATRIST

## 2023-05-17 PROCEDURE — 99214 OFFICE O/P EST MOD 30 MIN: CPT | Mod: PBBFAC | Performed by: PODIATRIST

## 2023-05-17 PROCEDURE — 3044F HG A1C LEVEL LT 7.0%: CPT | Mod: CPTII,,, | Performed by: PODIATRIST

## 2023-05-17 PROCEDURE — 3074F PR MOST RECENT SYSTOLIC BLOOD PRESSURE < 130 MM HG: ICD-10-PCS | Mod: CPTII,,, | Performed by: PODIATRIST

## 2023-05-17 RX ORDER — CIPROFLOXACIN 500 MG/1
500 TABLET ORAL EVERY 12 HOURS
Qty: 28 TABLET | Refills: 0 | Status: SHIPPED | OUTPATIENT
Start: 2023-05-17 | End: 2023-05-31

## 2023-05-19 ENCOUNTER — TELEPHONE (OUTPATIENT)
Dept: PODIATRY | Facility: CLINIC | Age: 63
End: 2023-05-19
Payer: MEDICAID

## 2023-05-19 NOTE — TELEPHONE ENCOUNTER
----- Message from Martha Dsouza sent at 5/18/2023  4:55 PM CDT -----  Regarding: call back  Name of Who is Calling: Lydia with [facility]         What is the request in detail: Calling to see if he would monitor labs that are done weekly and sent to [facility], via the home health nurse will take those labs [facility] . Please advise.         Can the clinic reply by [facility]:           What Number to Call Back if not in [facility]: [phone]

## 2023-05-19 NOTE — TELEPHONE ENCOUNTER
They would like to see if Dr. Donaldson would be monitoring labs during the IV therapy for this patient. The original order was from the hospitalist. Antibiotic is from [facility] it is Ceftriaxone 2gm daily until next week.

## 2023-05-21 NOTE — PROGRESS NOTES
"Juan Pablo Chang is a 62 y.o. male patient.   1. Amputation of foot      Past Medical History:   Diagnosis Date    Diabetes mellitus     Diabetes mellitus, type 2     Hypertension     Neuropathy      No past surgical history pertinent negatives on file.  Scheduled Meds:  Continuous Infusions:  PRN Meds:    Review of patient's allergies indicates:  No Known Allergies  There are no hospital problems to display for this patient.    Blood pressure 125/70, pulse 83, resp. rate 16, height 5' 8" (1.727 m), weight 61.4 kg (135 lb 4.8 oz).                                                                                                                  Subjective  Objective:  Vital signs (most recent): Blood pressure 125/70, pulse 83, resp. rate 16, height 5' 8" (1.727 m), weight 61.4 kg (135 lb 4.8 oz).  Assessment & Plan  Presents today for his initial outpatient postoperative evaluation following a transmetatarsal amputation right foot.  Currently taking Cipro oral which I did extend for another 2 weeks to be safe patient is currently on IV Rocephin and will complete the Rocephin by May 21st.  On evaluation patient has significant reduction in previously noted edema and maceration the area is healing well he still has a little bit of moisture on the lateral aspect of the right foot home health is currently seeing the patient 1 time per week care in home home health is currently seeing the patient.  Patient states home health has drained his brother to change the dressing the other times so that the dressing is being changed every other day.  Patient advised the area looks much better it was painted with Betadine silver alginate and a well-padded dressing applied we will continue the current dressing plan and I plan to follow up with the patient in 2 weeks at which time I will likely start to remove sutures.  Patient advised to contact us with any problems questions or concerns prior to scheduled follow-up  This note was " created using [employer] voice recognition software that occasionally misinterpreted phrases or words.   Orion Donaldson DPM  5/20/2023

## 2023-05-23 ENCOUNTER — TELEPHONE (OUTPATIENT)
Dept: PODIATRY | Facility: CLINIC | Age: 63
End: 2023-05-23
Payer: MEDICAID

## 2023-05-23 NOTE — TELEPHONE ENCOUNTER
Spoke with Dalia at  and V/O given per Dr. Donaldson to remove the PICC line, understanding verbalized.

## 2023-05-23 NOTE — TELEPHONE ENCOUNTER
----- Message from Orion Donaldson DPM sent at 5/23/2023  4:51 PM CDT -----  Contact: Dalia  That is fine you can go ahead and give them a verbal to remove the PICC line.  ----- Message -----  From: Lenora Saleh LPN  Sent: 5/23/2023   3:58 PM CDT  To: Orion Donaldson DPM    Please advise.  ----- Message -----  From: Sandra Song  Sent: 5/23/2023   2:47 PM CDT  To: Anika Sears Staff    Type: Needs Medical Advice    Who Called: Dalia/ [facility]  Best Call Back Number: [phone]  Additional  Information: Requesting a call back to get a verbal order  to remove picc line. Pt has completed IV Antibiotic , the brother is longer available to flush line everyday. And pt can not do it on his own  Please Advise- Thank you

## 2023-05-25 ENCOUNTER — TELEPHONE (OUTPATIENT)
Dept: PODIATRY | Facility: CLINIC | Age: 63
End: 2023-05-25
Payer: MEDICAID

## 2023-05-25 NOTE — TELEPHONE ENCOUNTER
Refill request.    LOV on 5/17/23    oxyCODONE (ROXICODONE) 5 MG immediate release tablet 20 tablet 0 4/6/2023 4/11/2023 No   Sig - Route: Take 1 tablet (5 mg total) by mouth every 6 (six) hours as needed for Pain. - Oral   Class: Print   Earliest Fill Date: 4/6/2023   Notes to Pharmacy: n/a    Order: [id number]   Date/Time Signed: 4/6/2023 09:36

## 2023-05-25 NOTE — TELEPHONE ENCOUNTER
----- Message from Yulisa Shaffer sent at 5/25/2023 11:20 AM CDT -----  Contact: pt [phone]  Type: Needs Medical Advice  Who Called:  Pt   Symptoms (please be specific):  Foot pain after surgery   Pharmacy name and phone #:      [id number] - [place], [place] - [address] [facility] [address]  [address]  [place] [address]  Phone: [phone] Fax: [phone]      Best Call Back Number: [phone]  Additional Information:Pt requesting pain meds be sent to pharmacy. Pls call back and advise

## 2023-05-30 ENCOUNTER — TELEPHONE (OUTPATIENT)
Dept: PODIATRY | Facility: CLINIC | Age: 63
End: 2023-05-30
Payer: MEDICAID

## 2023-05-31 ENCOUNTER — OFFICE VISIT (OUTPATIENT)
Dept: PODIATRY | Facility: CLINIC | Age: 63
End: 2023-05-31
Payer: MEDICAID

## 2023-05-31 VITALS
HEART RATE: 88 BPM | DIASTOLIC BLOOD PRESSURE: 68 MMHG | SYSTOLIC BLOOD PRESSURE: 129 MMHG | HEIGHT: 68 IN | WEIGHT: 135 LBS | BODY MASS INDEX: 20.46 KG/M2

## 2023-05-31 DIAGNOSIS — E11.9 COMPREHENSIVE DIABETIC FOOT EXAMINATION, TYPE 2 DM, ENCOUNTER FOR: ICD-10-CM

## 2023-05-31 DIAGNOSIS — E11.649 TYPE 2 DIABETES MELLITUS WITH HYPOGLYCEMIA WITHOUT COMA, WITHOUT LONG-TERM CURRENT USE OF INSULIN: Primary | ICD-10-CM

## 2023-05-31 PROCEDURE — 3044F HG A1C LEVEL LT 7.0%: CPT | Mod: CPTII,,, | Performed by: PODIATRIST

## 2023-05-31 PROCEDURE — 3008F PR BODY MASS INDEX (BMI) DOCUMENTED: ICD-10-PCS | Mod: CPTII,,, | Performed by: PODIATRIST

## 2023-05-31 PROCEDURE — 3008F BODY MASS INDEX DOCD: CPT | Mod: CPTII,,, | Performed by: PODIATRIST

## 2023-05-31 PROCEDURE — 99213 OFFICE O/P EST LOW 20 MIN: CPT | Mod: PBBFAC | Performed by: PODIATRIST

## 2023-05-31 PROCEDURE — 1160F PR REVIEW ALL MEDS BY PRESCRIBER/CLIN PHARMACIST DOCUMENTED: ICD-10-PCS | Mod: CPTII,,, | Performed by: PODIATRIST

## 2023-05-31 PROCEDURE — 1160F RVW MEDS BY RX/DR IN RCRD: CPT | Mod: CPTII,,, | Performed by: PODIATRIST

## 2023-05-31 PROCEDURE — 1159F MED LIST DOCD IN RCRD: CPT | Mod: CPTII,,, | Performed by: PODIATRIST

## 2023-05-31 PROCEDURE — 3074F PR MOST RECENT SYSTOLIC BLOOD PRESSURE < 130 MM HG: ICD-10-PCS | Mod: CPTII,,, | Performed by: PODIATRIST

## 2023-05-31 PROCEDURE — 99024 POSTOP FOLLOW-UP VISIT: CPT | Mod: ,,, | Performed by: PODIATRIST

## 2023-05-31 PROCEDURE — 99999 PR PBB SHADOW E&M-EST. PATIENT-LVL III: ICD-10-PCS | Mod: PBBFAC,,, | Performed by: PODIATRIST

## 2023-05-31 PROCEDURE — 99024 PR POST-OP FOLLOW-UP VISIT: ICD-10-PCS | Mod: ,,, | Performed by: PODIATRIST

## 2023-05-31 PROCEDURE — 4010F ACE/ARB THERAPY RXD/TAKEN: CPT | Mod: CPTII,,, | Performed by: PODIATRIST

## 2023-05-31 PROCEDURE — 99999 PR PBB SHADOW E&M-EST. PATIENT-LVL III: CPT | Mod: PBBFAC,,, | Performed by: PODIATRIST

## 2023-05-31 PROCEDURE — 4010F PR ACE/ARB THEARPY RXD/TAKEN: ICD-10-PCS | Mod: CPTII,,, | Performed by: PODIATRIST

## 2023-05-31 PROCEDURE — 3044F PR MOST RECENT HEMOGLOBIN A1C LEVEL <7.0%: ICD-10-PCS | Mod: CPTII,,, | Performed by: PODIATRIST

## 2023-05-31 PROCEDURE — 3078F DIAST BP <80 MM HG: CPT | Mod: CPTII,,, | Performed by: PODIATRIST

## 2023-05-31 PROCEDURE — 3078F PR MOST RECENT DIASTOLIC BLOOD PRESSURE < 80 MM HG: ICD-10-PCS | Mod: CPTII,,, | Performed by: PODIATRIST

## 2023-05-31 PROCEDURE — 1159F PR MEDICATION LIST DOCUMENTED IN MEDICAL RECORD: ICD-10-PCS | Mod: CPTII,,, | Performed by: PODIATRIST

## 2023-05-31 PROCEDURE — 3074F SYST BP LT 130 MM HG: CPT | Mod: CPTII,,, | Performed by: PODIATRIST

## 2023-05-31 RX ORDER — METFORMIN HYDROCHLORIDE 1000 MG/1
TABLET ORAL
COMMUNITY
Start: 2023-05-30

## 2023-05-31 RX ORDER — LISINOPRIL 40 MG/1
40 TABLET ORAL
COMMUNITY
Start: 2023-05-30 | End: 2023-08-16

## 2023-05-31 RX ORDER — GABAPENTIN 800 MG/1
400 TABLET ORAL
COMMUNITY
Start: 2023-05-30 | End: 2023-08-16

## 2023-06-04 NOTE — PROGRESS NOTES
"Juan Pablo Chang is a 62 y.o. male patient.   1. Type 2 diabetes mellitus with hypoglycemia without coma, without long-term current use of insulin    2. Comprehensive diabetic foot examination, type 2 DM, encounter for      Past Medical History:   Diagnosis Date    Diabetes mellitus     Diabetes mellitus, type 2     Hypertension     Neuropathy      No past surgical history pertinent negatives on file.  Scheduled Meds:  Continuous Infusions:  PRN Meds:    Review of patient's allergies indicates:  No Known Allergies  There are no hospital problems to display for this patient.    Blood pressure 129/68, pulse 88, height 5' 8" (1.727 m), weight 61.2 kg (135 lb).                                                                                                                                Subjective  Objective:  Vital signs (most recent): Blood pressure 129/68, pulse 88, height 5' 8" (1.727 m), weight 61.2 kg (135 lb).  Assessment & Plan  Presents today for his initial outpatient postoperative evaluation following a transmetatarsal amputation right foot.  Patient advised that the right foot does look better it is not as wet and macerated now that home health has been changing the dressing as well as the patient himself he has been applying Betadine silver alginate to the incision I want him to continue doing this I did remove the patient's sutures today there is still a area that is somewhat macerated on the lateral aspect of the incision however this appears to be superficial I did dispense the patient a black fracture boot so that he can start ambulating more while still being protected he will continue to change the dressing every day and is currently under the care of Geraldine in home home health will be changing the dressing several times a week.  Plan follow-up 2 weeks I am hopeful that will be able to discontinue the dressing completely and allow the patient to start getting the area wet at that time.  Patient advised to " contact us with any problems questions or concerns prior to scheduled follow-up.  This note was created using [employer]ic voice recognition software that occasionally misinterpreted phrases or words.   Orion Donaldson DPM  6/3/2023

## 2023-06-14 ENCOUNTER — OFFICE VISIT (OUTPATIENT)
Dept: PODIATRY | Facility: CLINIC | Age: 63
End: 2023-06-14
Payer: MEDICAID

## 2023-06-14 ENCOUNTER — DOCUMENT SCAN (OUTPATIENT)
Dept: HOME HEALTH SERVICES | Facility: HOSPITAL | Age: 63
End: 2023-06-14
Payer: MEDICAID

## 2023-06-14 VITALS — BODY MASS INDEX: 21.67 KG/M2 | HEIGHT: 68 IN | WEIGHT: 143 LBS

## 2023-06-14 DIAGNOSIS — S98.919A AMPUTATION OF FOOT: Primary | ICD-10-CM

## 2023-06-14 PROCEDURE — 99999 PR PBB SHADOW E&M-EST. PATIENT-LVL III: CPT | Mod: PBBFAC,,, | Performed by: PODIATRIST

## 2023-06-14 PROCEDURE — 4010F PR ACE/ARB THEARPY RXD/TAKEN: ICD-10-PCS | Mod: CPTII,,, | Performed by: PODIATRIST

## 2023-06-14 PROCEDURE — 1159F PR MEDICATION LIST DOCUMENTED IN MEDICAL RECORD: ICD-10-PCS | Mod: CPTII,,, | Performed by: PODIATRIST

## 2023-06-14 PROCEDURE — 3008F PR BODY MASS INDEX (BMI) DOCUMENTED: ICD-10-PCS | Mod: CPTII,,, | Performed by: PODIATRIST

## 2023-06-14 PROCEDURE — 99213 OFFICE O/P EST LOW 20 MIN: CPT | Mod: PBBFAC | Performed by: PODIATRIST

## 2023-06-14 PROCEDURE — 99999 PR PBB SHADOW E&M-EST. PATIENT-LVL III: ICD-10-PCS | Mod: PBBFAC,,, | Performed by: PODIATRIST

## 2023-06-14 PROCEDURE — 1160F PR REVIEW ALL MEDS BY PRESCRIBER/CLIN PHARMACIST DOCUMENTED: ICD-10-PCS | Mod: CPTII,,, | Performed by: PODIATRIST

## 2023-06-14 PROCEDURE — 3044F PR MOST RECENT HEMOGLOBIN A1C LEVEL <7.0%: ICD-10-PCS | Mod: CPTII,,, | Performed by: PODIATRIST

## 2023-06-14 PROCEDURE — 3044F HG A1C LEVEL LT 7.0%: CPT | Mod: CPTII,,, | Performed by: PODIATRIST

## 2023-06-14 PROCEDURE — 4010F ACE/ARB THERAPY RXD/TAKEN: CPT | Mod: CPTII,,, | Performed by: PODIATRIST

## 2023-06-14 PROCEDURE — 99024 PR POST-OP FOLLOW-UP VISIT: ICD-10-PCS | Mod: ,,, | Performed by: PODIATRIST

## 2023-06-14 PROCEDURE — 1160F RVW MEDS BY RX/DR IN RCRD: CPT | Mod: CPTII,,, | Performed by: PODIATRIST

## 2023-06-14 PROCEDURE — 99024 POSTOP FOLLOW-UP VISIT: CPT | Mod: ,,, | Performed by: PODIATRIST

## 2023-06-14 PROCEDURE — 3008F BODY MASS INDEX DOCD: CPT | Mod: CPTII,,, | Performed by: PODIATRIST

## 2023-06-14 PROCEDURE — 1159F MED LIST DOCD IN RCRD: CPT | Mod: CPTII,,, | Performed by: PODIATRIST

## 2023-06-14 RX ORDER — LISINOPRIL 30 MG/1
15 TABLET ORAL
COMMUNITY
Start: 2023-06-12

## 2023-06-18 NOTE — PROGRESS NOTES
"Juan Pablo Chang is a 62 y.o. male patient.   1. Amputation of foot      Past Medical History:   Diagnosis Date    Diabetes mellitus     Diabetes mellitus, type 2     Hypertension     Neuropathy      No past surgical history pertinent negatives on file.  Scheduled Meds:  Continuous Infusions:  PRN Meds:    Review of patient's allergies indicates:  No Known Allergies  There are no hospital problems to display for this patient.    Height 5' 8" (1.727 m), weight 64.9 kg (143 lb).                                      Subjective  Objective:  Vital signs (most recent): Height 5' 8" (1.727 m), weight 64.9 kg (143 lb).  Assessment & Plan  Presents today for postoperative evaluation following a transmetatarsal amputation right foot.  Patient states that he has been applying Betadine to the incision site as directed he has been changing the dressing I am trying to stay off of his foot as much as possible the area looks a lot better since the sutures were removed there were several remaining sutures that required removal today much of the scab area was very loose there is new healthy pink skin and tissue underlying this area I do not want the patient getting his foot wet yet I am hopeful in a couple weeks he will be able to get it wet I do want him to continue to apply the Betadine twice a day if necessary but he does not need to keep a dressing on the area he can cover the area with a sock and continue to wear the fracture boot he states the fracture boot has been extremely comfortable and he likes wearing it it helps him with his stability.  Patient advised to contact us with any problems questions or concerns prior to scheduled follow-up.  This note was created using [employer] voice recognition software that occasionally misinterpreted phrases or words.   Orion Donaldson DPM  6/17/2023                "

## 2023-06-21 ENCOUNTER — TELEPHONE (OUTPATIENT)
Dept: PODIATRY | Facility: CLINIC | Age: 63
End: 2023-06-21
Payer: MEDICAID

## 2023-06-21 DIAGNOSIS — S98.919A AMPUTATION OF FOOT: Primary | ICD-10-CM

## 2023-06-21 NOTE — TELEPHONE ENCOUNTER
Home health wanting to know if patient can have order for home health to come out once a week to check site and vitals. Home health had concerns and didn't want to cut him out completely.

## 2023-06-21 NOTE — TELEPHONE ENCOUNTER
----- Message from Steffanie Gimenez, Patient Care Assistant sent at 6/21/2023  9:56 AM CDT -----  Contact: [facility]   Margarette Pfeiffer is calling to speak w/ a nurse in regards to his HH orders ph [phone]  thanks

## 2023-06-24 PROCEDURE — G0179 MD RECERTIFICATION HHA PT: HCPCS | Mod: ,,, | Performed by: PODIATRIST

## 2023-06-24 PROCEDURE — G0179 PR HOME HEALTH MD RECERTIFICATION: ICD-10-PCS | Mod: ,,, | Performed by: PODIATRIST

## 2023-06-27 ENCOUNTER — DOCUMENT SCAN (OUTPATIENT)
Dept: HOME HEALTH SERVICES | Facility: HOSPITAL | Age: 63
End: 2023-06-27
Payer: MEDICAID

## 2023-07-03 PROBLEM — J96.01 ACUTE RESPIRATORY FAILURE WITH HYPOXIA: Status: RESOLVED | Noted: 2023-04-03 | Resolved: 2023-07-03

## 2023-07-03 PROBLEM — I21.4 NSTEMI (NON-ST ELEVATED MYOCARDIAL INFARCTION): Status: RESOLVED | Noted: 2023-04-01 | Resolved: 2023-07-03

## 2023-07-06 ENCOUNTER — TELEPHONE (OUTPATIENT)
Dept: PODIATRY | Facility: CLINIC | Age: 63
End: 2023-07-06
Payer: MEDICARE

## 2023-07-06 NOTE — TELEPHONE ENCOUNTER
----- Message from Shelly Sharma sent at 7/6/2023  2:18 PM CDT -----  Contact: self  Type:  Needs Medical Advice    Who Called: self    Would the patient rather a call back or a response via [facility]? call  Best Call Back Number: [phone] (home)     Additional Information: pt would like to set up his check up appt for his wound with dr that he missed. Please advise and thank you.

## 2023-07-07 ENCOUNTER — EXTERNAL HOME HEALTH (OUTPATIENT)
Dept: HOME HEALTH SERVICES | Facility: HOSPITAL | Age: 63
End: 2023-07-07
Payer: MEDICAID

## 2023-07-17 ENCOUNTER — OFFICE VISIT (OUTPATIENT)
Dept: PODIATRY | Facility: CLINIC | Age: 63
End: 2023-07-17
Payer: MEDICARE

## 2023-07-17 VITALS
HEIGHT: 68 IN | DIASTOLIC BLOOD PRESSURE: 83 MMHG | BODY MASS INDEX: 22.28 KG/M2 | HEART RATE: 83 BPM | SYSTOLIC BLOOD PRESSURE: 172 MMHG | WEIGHT: 147 LBS

## 2023-07-17 DIAGNOSIS — L97.512 ULCER OF RIGHT FOOT WITH FAT LAYER EXPOSED: Primary | ICD-10-CM

## 2023-07-17 DIAGNOSIS — E11.9 COMPREHENSIVE DIABETIC FOOT EXAMINATION, TYPE 2 DM, ENCOUNTER FOR: ICD-10-CM

## 2023-07-17 DIAGNOSIS — E11.649 TYPE 2 DIABETES MELLITUS WITH HYPOGLYCEMIA WITHOUT COMA, WITHOUT LONG-TERM CURRENT USE OF INSULIN: ICD-10-CM

## 2023-07-17 PROCEDURE — 99214 OFFICE O/P EST MOD 30 MIN: CPT | Mod: PBBFAC | Performed by: PODIATRIST

## 2023-07-17 PROCEDURE — 99214 OFFICE O/P EST MOD 30 MIN: CPT | Mod: S$PBB,,, | Performed by: PODIATRIST

## 2023-07-17 PROCEDURE — 99214 PR OFFICE/OUTPT VISIT, EST, LEVL IV, 30-39 MIN: ICD-10-PCS | Mod: S$PBB,,, | Performed by: PODIATRIST

## 2023-07-17 PROCEDURE — 87077 CULTURE AEROBIC IDENTIFY: CPT | Performed by: PODIATRIST

## 2023-07-17 PROCEDURE — 99999 PR PBB SHADOW E&M-EST. PATIENT-LVL IV: ICD-10-PCS | Mod: PBBFAC,,, | Performed by: PODIATRIST

## 2023-07-17 PROCEDURE — 87186 SC STD MICRODIL/AGAR DIL: CPT | Performed by: PODIATRIST

## 2023-07-17 PROCEDURE — 99999 PR PBB SHADOW E&M-EST. PATIENT-LVL IV: CPT | Mod: PBBFAC,,, | Performed by: PODIATRIST

## 2023-07-17 PROCEDURE — 87070 CULTURE OTHR SPECIMN AEROBIC: CPT | Performed by: PODIATRIST

## 2023-07-18 PROBLEM — L97.512 ULCER OF RIGHT FOOT WITH FAT LAYER EXPOSED: Status: ACTIVE | Noted: 2023-07-18

## 2023-07-18 NOTE — PROGRESS NOTES
Subjective     Patient ID: Juan Pablo Chang is a 62 y.o. male.    Chief Complaint: Follow-up  Patient presents today for follow-up he previously had a transmetatarsal amputation of the right foot he was to have followed up in several weeks but was a no call no-show for his last appointment he is concerned about a large scab formation on the right foot.  HPI  Review of Systems   Neurological:  Positive for numbness.        Objective     Physical Exam  Vitals and nursing note reviewed.   Constitutional:       Appearance: Normal appearance.   Cardiovascular:      Pulses:           Dorsalis pedis pulses are 1+ on the right side and 1+ on the left side.        Posterior tibial pulses are 1+ on the right side and 1+ on the left side.   Pulmonary:      Effort: Pulmonary effort is normal.   Musculoskeletal:         General: Deformity present.      Right foot: Decreased range of motion. Deformity present.        Feet:    Feet:      Right foot:      Protective Sensation: 5 sites tested.   1 site sensed.     Skin integrity: Ulcer, skin breakdown and erythema present.      Left foot:      Protective Sensation: 5 sites tested.   1 site sensed.  Skin:     Capillary Refill: Capillary refill takes 2 to 3 seconds.      Findings: Erythema present.   Neurological:      Mental Status: He is alert.      Sensory: Sensory deficit present.   Psychiatric:         Mood and Affect: Mood normal.         Behavior: Behavior normal.                            Assessment and Plan     1. Ulcer of right foot with fat layer exposed  -     Aerobic culture    2. Type 2 diabetes mellitus with hypoglycemia without coma, without long-term current use of insulin    3. Comprehensive diabetic foot examination, type 2 DM, encounter for        Patient presents today for follow-up he previously had a transmetatarsal amputation of the right foot he was to have followed up in several weeks but was a no call no-show for his last appointment he is concerned about a  large scab formation on the right foot.  Patient has continued to wear the fracture boot he states he feels much better and much more stable when he wears the fracture boot he has not been getting his right foot wet.  Patient states he has been applying the Betadine to the area.  On evaluation patient has a large scab on the dorsal lateral aspect of the right foot I was able to carefully non excisionally trim and remove the scab which displayed a small open wound proximally 1 cm in diameter there was fibrous tissue in the central aspect which was debrided I performed a culture and sensitivity on the area and will place the patient on appropriate antibiotics pending culture several other small scabs were removed and there is healing pink skin and tissue underlying these areas.  Patient is not to get the area wet he is to clean the area every day with Dakin solution apply silver alginate and keep a light dressing on the area he does want to remain in the fracture boot I plan to see him for follow-up in 7-10 days and I have encouraged the patient to keep his follow-up appointment.  Patient advised to contact us with any problems questions or concerns prior to scheduled follow-up.  Patient is beyond his global period following previous amputation.  Total face-to-face time including discussion evaluation treatment discussion of treatment options treatment plan non excisional debridement and wound care equaled 30 minutes.  This note was created using [employer] voice recognition software that occasionally misinterpreted phrases or words.          No follow-ups on file.

## 2023-07-20 ENCOUNTER — TELEPHONE (OUTPATIENT)
Dept: PODIATRY | Facility: CLINIC | Age: 63
End: 2023-07-20
Payer: MEDICARE

## 2023-07-20 LAB — BACTERIA SPEC AEROBE CULT: ABNORMAL

## 2023-07-20 RX ORDER — SULFAMETHOXAZOLE AND TRIMETHOPRIM 800; 160 MG/1; MG/1
1 TABLET ORAL 2 TIMES DAILY
Qty: 28 TABLET | Refills: 0 | Status: SHIPPED | OUTPATIENT
Start: 2023-07-20 | End: 2023-08-03

## 2023-07-20 NOTE — TELEPHONE ENCOUNTER
----- Message from Orion Donaldson DPM sent at 7/20/2023  2:18 PM CDT -----    Please call the patient and advise him his culture was positive for E coli he needs to keep that area dry and clean changing the dressing every day I have sent in a prescription for Bactrim which I want him to start immediately.  ----- Message -----  From: Garrett, [employer] [facility] [employer]  Sent: 7/19/2023   8:54 AM CDT  To: Orion Donaldson DPM       69

## 2023-07-24 PROBLEM — N17.9 AKI (ACUTE KIDNEY INJURY): Status: RESOLVED | Noted: 2023-04-01 | Resolved: 2023-07-24

## 2023-07-24 PROBLEM — A41.9 SEPSIS: Status: RESOLVED | Noted: 2023-04-21 | Resolved: 2023-07-24

## 2023-08-04 ENCOUNTER — EXTERNAL HOME HEALTH (OUTPATIENT)
Dept: HOME HEALTH SERVICES | Facility: HOSPITAL | Age: 63
End: 2023-08-04
Payer: MEDICARE

## 2023-08-16 ENCOUNTER — OFFICE VISIT (OUTPATIENT)
Dept: PODIATRY | Facility: CLINIC | Age: 63
End: 2023-08-16
Payer: MEDICARE

## 2023-08-16 VITALS
SYSTOLIC BLOOD PRESSURE: 160 MMHG | RESPIRATION RATE: 16 BRPM | HEIGHT: 68 IN | BODY MASS INDEX: 21.98 KG/M2 | HEART RATE: 84 BPM | WEIGHT: 145 LBS | DIASTOLIC BLOOD PRESSURE: 88 MMHG

## 2023-08-16 DIAGNOSIS — E11.9 COMPREHENSIVE DIABETIC FOOT EXAMINATION, TYPE 2 DM, ENCOUNTER FOR: ICD-10-CM

## 2023-08-16 DIAGNOSIS — S98.919A AMPUTATION OF FOOT: Primary | ICD-10-CM

## 2023-08-16 DIAGNOSIS — E11.649 TYPE 2 DIABETES MELLITUS WITH HYPOGLYCEMIA WITHOUT COMA, WITHOUT LONG-TERM CURRENT USE OF INSULIN: ICD-10-CM

## 2023-08-16 PROCEDURE — 99999 PR PBB SHADOW E&M-EST. PATIENT-LVL IV: ICD-10-PCS | Mod: PBBFAC,,, | Performed by: PODIATRIST

## 2023-08-16 PROCEDURE — 99999 PR PBB SHADOW E&M-EST. PATIENT-LVL IV: CPT | Mod: PBBFAC,,, | Performed by: PODIATRIST

## 2023-08-16 PROCEDURE — 99214 OFFICE O/P EST MOD 30 MIN: CPT | Mod: PBBFAC | Performed by: PODIATRIST

## 2023-08-16 PROCEDURE — 99213 OFFICE O/P EST LOW 20 MIN: CPT | Mod: S$PBB,,, | Performed by: PODIATRIST

## 2023-08-16 PROCEDURE — 99213 PR OFFICE/OUTPT VISIT, EST, LEVL III, 20-29 MIN: ICD-10-PCS | Mod: S$PBB,,, | Performed by: PODIATRIST

## 2023-08-20 NOTE — PROGRESS NOTES
Subjective     Patient ID: Juan Pablo Chang is a 63 y.o. male.    Chief Complaint: Follow-up, Foot Ulcer, and Diabetes Mellitus  Patient presents today for follow-up he previously had a transmetatarsal amputation of the right foot he was to have followed up in several weeks but was a no call no-show for his last appointment he is concerned about a large scab formation on the right foot.  HPI  Review of Systems   Neurological:  Positive for numbness.          Objective     Physical Exam  Vitals and nursing note reviewed.   Constitutional:       Appearance: Normal appearance.   Cardiovascular:      Pulses:           Dorsalis pedis pulses are 1+ on the right side and 1+ on the left side.        Posterior tibial pulses are 1+ on the right side and 1+ on the left side.   Pulmonary:      Effort: Pulmonary effort is normal.   Musculoskeletal:         General: Deformity present.      Right foot: Decreased range of motion. Deformity present.        Feet:    Feet:      Right foot:      Protective Sensation: 5 sites tested.   1 site sensed.     Skin integrity: Ulcer, skin breakdown and erythema present.      Left foot:      Protective Sensation: 5 sites tested.   1 site sensed.  Skin:     Capillary Refill: Capillary refill takes 2 to 3 seconds.      Findings: Erythema present.   Neurological:      Mental Status: He is alert.      Sensory: Sensory deficit present.   Psychiatric:         Mood and Affect: Mood normal.         Behavior: Behavior normal.                                                                        Assessment and Plan     1. Amputation of foot    2. Type 2 diabetes mellitus with hypoglycemia without coma, without long-term current use of insulin    3. Comprehensive diabetic foot examination, type 2 DM, encounter for        Patient presents today for follow-up he previously had a transmetatarsal amputation of the right foot he was to have followed up in several weeks but was a no call no-show for his last  appointment.  On evaluation the amputation site is completely healed and resolved there was only a small hyperkeratotic scab left on the lateral aspect this was easily debrided displaying no open wound no drainage no signs of infection patient advised he can get the area wet no longer has to keep the dressing on the area and he can start wearing a regular shoe as tolerated states he still trying to navigate his way around trying to wear regular shoe and use a walker because of balance he is currently wearing a fracture boot.  Patient is being released following previous surgery and will be seen as needed for follow-up.  Non excisional debridement performed today to remove callus buildup over the area of incision.  Face-to-face time equaled 20 minutes.  This note was created using [employer] voice recognition software that occasionally misinterpreted phrases or words.          No follow-ups on file.

## 2024-09-10 ENCOUNTER — HOSPITAL ENCOUNTER (OUTPATIENT)
Facility: HOSPITAL | Age: 64
Discharge: LEFT BEFORE DISCHARGE | End: 2024-09-11
Attending: STUDENT IN AN ORGANIZED HEALTH CARE EDUCATION/TRAINING PROGRAM | Admitting: INTERNAL MEDICINE
Payer: MEDICARE

## 2024-09-10 DIAGNOSIS — K52.9 ENTERITIS: ICD-10-CM

## 2024-09-10 DIAGNOSIS — R07.9 CHEST PAIN: ICD-10-CM

## 2024-09-10 DIAGNOSIS — R10.9 ABDOMINAL PAIN: ICD-10-CM

## 2024-09-10 DIAGNOSIS — D72.829 LEUKOCYTOSIS, UNSPECIFIED TYPE: ICD-10-CM

## 2024-09-10 DIAGNOSIS — N17.9 AKI (ACUTE KIDNEY INJURY): Primary | ICD-10-CM

## 2024-09-10 PROCEDURE — 99285 EMERGENCY DEPT VISIT HI MDM: CPT

## 2024-09-11 VITALS
HEART RATE: 80 BPM | RESPIRATION RATE: 11 BRPM | WEIGHT: 145 LBS | TEMPERATURE: 100 F | BODY MASS INDEX: 21.98 KG/M2 | OXYGEN SATURATION: 95 % | SYSTOLIC BLOOD PRESSURE: 110 MMHG | HEIGHT: 68 IN | DIASTOLIC BLOOD PRESSURE: 56 MMHG

## 2024-09-11 PROBLEM — R10.30 LOWER ABDOMINAL PAIN: Status: ACTIVE | Noted: 2024-09-11

## 2024-09-11 PROBLEM — K52.9 GASTROENTERITIS: Status: ACTIVE | Noted: 2024-09-11

## 2024-09-11 LAB
ALBUMIN SERPL BCP-MCNC: 4.3 G/DL (ref 3.5–5.2)
ALP SERPL-CCNC: 53 U/L (ref 55–135)
ALT SERPL W/O P-5'-P-CCNC: 9 U/L (ref 10–44)
ANION GAP SERPL CALC-SCNC: 9 MMOL/L (ref 8–16)
AST SERPL-CCNC: 15 U/L (ref 10–40)
BASOPHILS # BLD AUTO: 0.05 K/UL (ref 0–0.2)
BASOPHILS NFR BLD: 0.3 % (ref 0–1.9)
BILIRUB SERPL-MCNC: 0.6 MG/DL (ref 0.1–1)
BILIRUB UR QL STRIP: NEGATIVE
BUN SERPL-MCNC: 32 MG/DL (ref 8–23)
CALCIUM SERPL-MCNC: 9.2 MG/DL (ref 8.7–10.5)
CHLORIDE SERPL-SCNC: 107 MMOL/L (ref 95–110)
CLARITY UR: CLEAR
CO2 SERPL-SCNC: 22 MMOL/L (ref 23–29)
COLOR UR: YELLOW
CREAT SERPL-MCNC: 1.8 MG/DL (ref 0.5–1.4)
CREAT SERPL-MCNC: 2.1 MG/DL (ref 0.5–1.4)
DIFFERENTIAL METHOD BLD: ABNORMAL
EOSINOPHIL # BLD AUTO: 0.1 K/UL (ref 0–0.5)
EOSINOPHIL NFR BLD: 0.4 % (ref 0–8)
ERYTHROCYTE [DISTWIDTH] IN BLOOD BY AUTOMATED COUNT: 13.3 % (ref 11.5–14.5)
EST. GFR  (NO RACE VARIABLE): 41 ML/MIN/1.73 M^2
GLUCOSE SERPL-MCNC: 127 MG/DL (ref 70–110)
GLUCOSE UR QL STRIP: NEGATIVE
HCT VFR BLD AUTO: 41.7 % (ref 40–54)
HGB BLD-MCNC: 13.7 G/DL (ref 14–18)
HGB UR QL STRIP: NEGATIVE
IMM GRANULOCYTES # BLD AUTO: 0.07 K/UL (ref 0–0.04)
IMM GRANULOCYTES NFR BLD AUTO: 0.4 % (ref 0–0.5)
INFLUENZA A, MOLECULAR: NEGATIVE
INFLUENZA B, MOLECULAR: NEGATIVE
KETONES UR QL STRIP: ABNORMAL
LEUKOCYTE ESTERASE UR QL STRIP: NEGATIVE
LIPASE SERPL-CCNC: 15 U/L (ref 4–60)
LYMPHOCYTES # BLD AUTO: 0.3 K/UL (ref 1–4.8)
LYMPHOCYTES NFR BLD: 1.9 % (ref 18–48)
MCH RBC QN AUTO: 29.5 PG (ref 27–31)
MCHC RBC AUTO-ENTMCNC: 32.9 G/DL (ref 32–36)
MCV RBC AUTO: 90 FL (ref 82–98)
MONOCYTES # BLD AUTO: 0.9 K/UL (ref 0.3–1)
MONOCYTES NFR BLD: 5.3 % (ref 4–15)
NEUTROPHILS # BLD AUTO: 15.7 K/UL (ref 1.8–7.7)
NEUTROPHILS NFR BLD: 91.7 % (ref 38–73)
NITRITE UR QL STRIP: NEGATIVE
NRBC BLD-RTO: 0 /100 WBC
OHS QRS DURATION: 122 MS
OHS QTC CALCULATION: 453 MS
OSMOLALITY SERPL: 307 MOSM/KG (ref 280–300)
PH UR STRIP: 6 [PH] (ref 5–8)
PLATELET # BLD AUTO: 326 K/UL (ref 150–450)
PMV BLD AUTO: 10.6 FL (ref 9.2–12.9)
POCT GLUCOSE: 127 MG/DL (ref 70–110)
POTASSIUM SERPL-SCNC: 5.2 MMOL/L (ref 3.5–5.1)
PROCALCITONIN SERPL IA-MCNC: 0.65 NG/ML (ref 0–0.5)
PROT SERPL-MCNC: 7.1 G/DL (ref 6–8.4)
PROT UR QL STRIP: ABNORMAL
RBC # BLD AUTO: 4.64 M/UL (ref 4.6–6.2)
SAMPLE: ABNORMAL
SARS-COV-2 RDRP RESP QL NAA+PROBE: NEGATIVE
SODIUM SERPL-SCNC: 138 MMOL/L (ref 136–145)
SP GR UR STRIP: 1.02 (ref 1–1.03)
SPECIMEN SOURCE: NORMAL
TROPONIN I SERPL HS-MCNC: 8.3 PG/ML (ref 0–14.9)
URN SPEC COLLECT METH UR: ABNORMAL
UROBILINOGEN UR STRIP-ACNC: NEGATIVE EU/DL
WBC # BLD AUTO: 17.1 K/UL (ref 3.9–12.7)

## 2024-09-11 PROCEDURE — G0378 HOSPITAL OBSERVATION PER HR: HCPCS

## 2024-09-11 PROCEDURE — 87502 INFLUENZA DNA AMP PROBE: CPT | Performed by: STUDENT IN AN ORGANIZED HEALTH CARE EDUCATION/TRAINING PROGRAM

## 2024-09-11 PROCEDURE — 63600175 PHARM REV CODE 636 W HCPCS: Performed by: INTERNAL MEDICINE

## 2024-09-11 PROCEDURE — 96361 HYDRATE IV INFUSION ADD-ON: CPT

## 2024-09-11 PROCEDURE — U0002 COVID-19 LAB TEST NON-CDC: HCPCS | Performed by: STUDENT IN AN ORGANIZED HEALTH CARE EDUCATION/TRAINING PROGRAM

## 2024-09-11 PROCEDURE — 81003 URINALYSIS AUTO W/O SCOPE: CPT | Performed by: STUDENT IN AN ORGANIZED HEALTH CARE EDUCATION/TRAINING PROGRAM

## 2024-09-11 PROCEDURE — 80053 COMPREHEN METABOLIC PANEL: CPT | Performed by: STUDENT IN AN ORGANIZED HEALTH CARE EDUCATION/TRAINING PROGRAM

## 2024-09-11 PROCEDURE — 84145 PROCALCITONIN (PCT): CPT | Performed by: STUDENT IN AN ORGANIZED HEALTH CARE EDUCATION/TRAINING PROGRAM

## 2024-09-11 PROCEDURE — 96376 TX/PRO/DX INJ SAME DRUG ADON: CPT

## 2024-09-11 PROCEDURE — 82962 GLUCOSE BLOOD TEST: CPT

## 2024-09-11 PROCEDURE — 96375 TX/PRO/DX INJ NEW DRUG ADDON: CPT

## 2024-09-11 PROCEDURE — 83690 ASSAY OF LIPASE: CPT | Performed by: STUDENT IN AN ORGANIZED HEALTH CARE EDUCATION/TRAINING PROGRAM

## 2024-09-11 PROCEDURE — 83930 ASSAY OF BLOOD OSMOLALITY: CPT | Performed by: STUDENT IN AN ORGANIZED HEALTH CARE EDUCATION/TRAINING PROGRAM

## 2024-09-11 PROCEDURE — 36415 COLL VENOUS BLD VENIPUNCTURE: CPT | Performed by: STUDENT IN AN ORGANIZED HEALTH CARE EDUCATION/TRAINING PROGRAM

## 2024-09-11 PROCEDURE — 84484 ASSAY OF TROPONIN QUANT: CPT | Performed by: STUDENT IN AN ORGANIZED HEALTH CARE EDUCATION/TRAINING PROGRAM

## 2024-09-11 PROCEDURE — 25000003 PHARM REV CODE 250: Performed by: INTERNAL MEDICINE

## 2024-09-11 PROCEDURE — 85025 COMPLETE CBC W/AUTO DIFF WBC: CPT | Performed by: STUDENT IN AN ORGANIZED HEALTH CARE EDUCATION/TRAINING PROGRAM

## 2024-09-11 PROCEDURE — 63600175 PHARM REV CODE 636 W HCPCS: Performed by: STUDENT IN AN ORGANIZED HEALTH CARE EDUCATION/TRAINING PROGRAM

## 2024-09-11 PROCEDURE — 96365 THER/PROPH/DIAG IV INF INIT: CPT

## 2024-09-11 PROCEDURE — 82565 ASSAY OF CREATININE: CPT | Mod: 59

## 2024-09-11 PROCEDURE — 25000003 PHARM REV CODE 250: Performed by: STUDENT IN AN ORGANIZED HEALTH CARE EDUCATION/TRAINING PROGRAM

## 2024-09-11 RX ORDER — SODIUM,POTASSIUM PHOSPHATES 280-250MG
2 POWDER IN PACKET (EA) ORAL
Status: DISCONTINUED | OUTPATIENT
Start: 2024-09-11 | End: 2024-09-11 | Stop reason: HOSPADM

## 2024-09-11 RX ORDER — IBUPROFEN 200 MG
24 TABLET ORAL
Status: DISCONTINUED | OUTPATIENT
Start: 2024-09-11 | End: 2024-09-11 | Stop reason: HOSPADM

## 2024-09-11 RX ORDER — POLYETHYLENE GLYCOL 3350 17 G/17G
17 POWDER, FOR SOLUTION ORAL DAILY PRN
Status: DISCONTINUED | OUTPATIENT
Start: 2024-09-11 | End: 2024-09-11 | Stop reason: HOSPADM

## 2024-09-11 RX ORDER — FAMOTIDINE 10 MG/ML
20 INJECTION INTRAVENOUS
Status: COMPLETED | OUTPATIENT
Start: 2024-09-11 | End: 2024-09-11

## 2024-09-11 RX ORDER — LANOLIN ALCOHOL/MO/W.PET/CERES
800 CREAM (GRAM) TOPICAL
Status: DISCONTINUED | OUTPATIENT
Start: 2024-09-11 | End: 2024-09-11 | Stop reason: HOSPADM

## 2024-09-11 RX ORDER — NALOXONE HCL 0.4 MG/ML
0.02 VIAL (ML) INJECTION
Status: DISCONTINUED | OUTPATIENT
Start: 2024-09-11 | End: 2024-09-11 | Stop reason: HOSPADM

## 2024-09-11 RX ORDER — INSULIN GLARGINE 300 U/ML
20 INJECTION, SOLUTION SUBCUTANEOUS EVERY MORNING
COMMUNITY

## 2024-09-11 RX ORDER — GLUCAGON 1 MG
1 KIT INJECTION
Status: DISCONTINUED | OUTPATIENT
Start: 2024-09-11 | End: 2024-09-11 | Stop reason: HOSPADM

## 2024-09-11 RX ORDER — ONDANSETRON HYDROCHLORIDE 2 MG/ML
4 INJECTION, SOLUTION INTRAVENOUS EVERY 8 HOURS PRN
Status: DISCONTINUED | OUTPATIENT
Start: 2024-09-11 | End: 2024-09-11 | Stop reason: HOSPADM

## 2024-09-11 RX ORDER — OXYCODONE HYDROCHLORIDE 5 MG/1
5 TABLET ORAL EVERY 6 HOURS PRN
Status: DISCONTINUED | OUTPATIENT
Start: 2024-09-11 | End: 2024-09-11 | Stop reason: HOSPADM

## 2024-09-11 RX ORDER — ACETAMINOPHEN 325 MG/1
650 TABLET ORAL EVERY 8 HOURS PRN
Status: DISCONTINUED | OUTPATIENT
Start: 2024-09-11 | End: 2024-09-11 | Stop reason: HOSPADM

## 2024-09-11 RX ORDER — ACETAMINOPHEN 325 MG/1
650 TABLET ORAL EVERY 4 HOURS PRN
Status: DISCONTINUED | OUTPATIENT
Start: 2024-09-11 | End: 2024-09-11 | Stop reason: HOSPADM

## 2024-09-11 RX ORDER — SODIUM CHLORIDE 9 MG/ML
INJECTION, SOLUTION INTRAVENOUS CONTINUOUS
Status: DISCONTINUED | OUTPATIENT
Start: 2024-09-11 | End: 2024-09-11 | Stop reason: HOSPADM

## 2024-09-11 RX ORDER — LISINOPRIL 40 MG/1
40 TABLET ORAL DAILY
COMMUNITY

## 2024-09-11 RX ORDER — MORPHINE SULFATE 2 MG/ML
2 INJECTION, SOLUTION INTRAMUSCULAR; INTRAVENOUS EVERY 4 HOURS PRN
Status: DISCONTINUED | OUTPATIENT
Start: 2024-09-11 | End: 2024-09-11 | Stop reason: HOSPADM

## 2024-09-11 RX ORDER — SODIUM CHLORIDE 0.9 % (FLUSH) 0.9 %
2 SYRINGE (ML) INJECTION EVERY 12 HOURS PRN
Status: DISCONTINUED | OUTPATIENT
Start: 2024-09-11 | End: 2024-09-11 | Stop reason: HOSPADM

## 2024-09-11 RX ORDER — ONDANSETRON HYDROCHLORIDE 2 MG/ML
4 INJECTION, SOLUTION INTRAVENOUS
Status: COMPLETED | OUTPATIENT
Start: 2024-09-11 | End: 2024-09-11

## 2024-09-11 RX ORDER — TALC
6 POWDER (GRAM) TOPICAL NIGHTLY PRN
Status: DISCONTINUED | OUTPATIENT
Start: 2024-09-11 | End: 2024-09-11 | Stop reason: HOSPADM

## 2024-09-11 RX ORDER — ONDANSETRON 4 MG/1
8 TABLET, ORALLY DISINTEGRATING ORAL EVERY 8 HOURS PRN
Status: DISCONTINUED | OUTPATIENT
Start: 2024-09-11 | End: 2024-09-11 | Stop reason: HOSPADM

## 2024-09-11 RX ORDER — ALUMINUM HYDROXIDE, MAGNESIUM HYDROXIDE, AND SIMETHICONE 1200; 120; 1200 MG/30ML; MG/30ML; MG/30ML
30 SUSPENSION ORAL 4 TIMES DAILY PRN
Status: DISCONTINUED | OUTPATIENT
Start: 2024-09-11 | End: 2024-09-11 | Stop reason: HOSPADM

## 2024-09-11 RX ORDER — AMOXICILLIN 250 MG
1 CAPSULE ORAL DAILY PRN
Status: DISCONTINUED | OUTPATIENT
Start: 2024-09-11 | End: 2024-09-11 | Stop reason: HOSPADM

## 2024-09-11 RX ORDER — IBUPROFEN 200 MG
16 TABLET ORAL
Status: DISCONTINUED | OUTPATIENT
Start: 2024-09-11 | End: 2024-09-11 | Stop reason: HOSPADM

## 2024-09-11 RX ADMIN — SODIUM CHLORIDE, POTASSIUM CHLORIDE, SODIUM LACTATE AND CALCIUM CHLORIDE 500 ML: 600; 310; 30; 20 INJECTION, SOLUTION INTRAVENOUS at 12:09

## 2024-09-11 RX ADMIN — PIPERACILLIN AND TAZOBACTAM 4.5 G: 4; .5 INJECTION, POWDER, LYOPHILIZED, FOR SOLUTION INTRAVENOUS; PARENTERAL at 02:09

## 2024-09-11 RX ADMIN — PIPERACILLIN SODIUM AND TAZOBACTAM SODIUM 4.5 G: 4; .5 INJECTION, POWDER, LYOPHILIZED, FOR SOLUTION INTRAVENOUS at 09:09

## 2024-09-11 RX ADMIN — ONDANSETRON 4 MG: 2 INJECTION INTRAMUSCULAR; INTRAVENOUS at 12:09

## 2024-09-11 RX ADMIN — FAMOTIDINE 20 MG: 10 INJECTION, SOLUTION INTRAVENOUS at 12:09

## 2024-09-11 NOTE — ED PROVIDER NOTES
Encounter Date: 9/10/2024       History     Chief Complaint   Patient presents with    Vomiting    Nausea     Symptoms began approximately three hours ago, nausea, vomiting,diarrhea, and weakness.  Patient also complains of abdominal pain.    Diarrhea     HPI  64-year-old man with a history of diabetes hypertension presents for evaluation of 2 hours of epigastric pain, nonbilious nonbloody vomiting, nausea, diarrhea.  He reports feeling warm and having chills.  He denies cough congestion chest pain shortness of breath change in urination.  He denies history of abdominal surgery.  Review of patient's allergies indicates:  No Known Allergies  Past Medical History:   Diagnosis Date    Diabetes mellitus     Diabetes mellitus, type 2     Hypertension     Neuropathy      Past Surgical History:   Procedure Laterality Date    FOOT AMPUTATION THROUGH METATARSAL Right 4/2/2023    Procedure: AMPUTATION, FOOT, TRANSMETATARSAL;  Surgeon: Orion Donaldson DPM;  Location: [facility];  Service: Podiatry;  Laterality: Right;     No family history on file.  Social History     Tobacco Use    Smoking status: Never    Smokeless tobacco: Current   Substance Use Topics    Alcohol use: Not Currently    Drug use: Never     Review of Systems   All other systems reviewed and are negative.      Physical Exam     Initial Vitals [09/11/24 0002]   BP Pulse Resp Temp SpO2   136/72 101 18 100 °F (37.8 °C) 100 %      MAP       --         Physical Exam    Constitutional: He appears well-developed and well-nourished.   HENT:   Head: Normocephalic and atraumatic.   Eyes: Right eye exhibits no discharge. Left eye exhibits no discharge.   Neck: No tracheal deviation present.   Cardiovascular:  Normal rate, regular rhythm and intact distal pulses.           Pulmonary/Chest: Breath sounds normal. No stridor. No respiratory distress.   Abdominal: Abdomen is soft. Bowel sounds are normal. There is abdominal tenderness.   Mild epigastric tenderness to  palpation without rebound or guarding There is no rebound and no guarding.   Musculoskeletal:      Comments: Right foot partial amputation     Neurological: He is alert and oriented to person, place, and time.   Skin: Skin is warm and dry.         ED Course   Procedures  Labs Reviewed   CBC W/ AUTO DIFFERENTIAL - Abnormal       Result Value    WBC 17.10 (*)     RBC 4.64      Hemoglobin 13.7 (*)     Hematocrit 41.7      MCV 90      MCH 29.5      MCHC 32.9      RDW 13.3      Platelets 326      MPV 10.6      Immature Granulocytes 0.4      Gran # (ANC) 15.7 (*)     Immature Grans (Abs) 0.07 (*)     Lymph # 0.3 (*)     Mono # 0.9      Eos # 0.1      Baso # 0.05      nRBC 0      Gran % 91.7 (*)     Lymph % 1.9 (*)     Mono % 5.3      Eosinophil % 0.4      Basophil % 0.3      Differential Method Automated     COMPREHENSIVE METABOLIC PANEL - Abnormal    Sodium 138      Potassium 5.2 (*)     Chloride 107      CO2 22 (*)     Glucose 127 (*)     BUN 32 (*)     Creatinine 1.8 (*)     Calcium 9.2      Total Protein 7.1      Albumin 4.3      Total Bilirubin 0.6      Alkaline Phosphatase 53 (*)     AST 15      ALT 9 (*)     eGFR 41.0 (*)     Anion Gap 9     URINALYSIS, REFLEX TO URINE CULTURE - Abnormal    Specimen UA Urine, Clean Catch      Color, UA Yellow      Appearance, UA Clear      pH, UA 6.0      Specific Gravity, UA 1.020      Protein, UA Trace (*)     Glucose, UA Negative      Ketones, UA Trace (*)     Bilirubin (UA) Negative      Occult Blood UA Negative      Nitrite, UA Negative      Urobilinogen, UA Negative      Leukocytes, UA Negative      Narrative:     Specimen Source->Urine   ISTAT CREATININE - Abnormal    POC Creatinine 2.1 (*)     Sample VENOUS     POCT GLUCOSE - Abnormal    POCT Glucose 127 (*)    LIPASE    Lipase 15     TROPONIN I HIGH SENSITIVITY    Troponin I High Sensitivity 8.3     SARS-COV-2 RNA AMPLIFICATION, QUAL    SARS-CoV-2 RNA, Amplification, Qual Negative     INFLUENZA A AND B ANTIGEN    Influenza  A, Molecular Negative      Influenza B, Molecular Negative      Flu A & B Source Trach asp.      Narrative:     Specimen Source->Nasopharyngeal Swab   POCT GLUCOSE MONITORING CONTINUOUS        ECG Results              EKG 12-lead (In process)        Collection Time Result Time QRS Duration OHS QTC Calculation    09/11/24 00:15:23 09/11/24 04:49:39 122 453                     In process by Interface, Lab In [facility] (09/11/24 04:49:47)                   Narrative:    Test Reason : R10.9,    Vent. Rate : 101 BPM     Atrial Rate : 101 BPM     P-R Int : 160 ms          QRS Dur : 122 ms      QT Int : 350 ms       P-R-T Axes : 028 -85 017 degrees     QTc Int : 453 ms    Sinus tachycardia  Right bundle branch block  Left anterior fascicular block   Bifascicular block   Septal infarct ,age undetermined  Abnormal ECG  When compared with ECG of 21-APR-2023 16:47,  Left anterior fascicular block is now Present  Septal infarct is now Present  Nonspecific T wave abnormality has replaced inverted T waves in Anterior  leads    Referred By: [email or web address]   SELF           Confirmed By:                                   Imaging Results              US Abdomen Limited (Final result)  Result time 09/11/24 03:34:57      Final result by Devin Delgado MD (09/11/24 03:34:57)                   Impression:      There is moderate gallbladder distention, there is no sonographic evidence for cholelithiasis and no abnormal gallbladder wall thickening, however the technologist indicates a positive sonographic Thorne's sign, clinical and historical correlation is otherwise needed.    Mild biliary dilatation, the common duct measures approximately 6.6 mm.      Electronically signed by: Devin Delgado  Date:    09/11/2024  Time:    03:34               Narrative:    EXAMINATION:  US ABDOMEN LIMITED    CLINICAL HISTORY:  epigastric pain;    TECHNIQUE:  Limited ultrasound of the right upper quadrant of the abdomen (including pancreas, liver,  gallbladder, common bile duct) was performed.    COMPARISON:  CT examination of the abdomen and pelvis September 11, 2024    FINDINGS:  The technologist indicates the examination is somewhat limited due to bowel gas shadowing and patient body habitus.    The pancreas is not well visualized and not well evaluated on this examination.  There is limited visualization of the aorta and IVC, visualized aspects appear unremarkable.    The submitted hepatic length measurement is 16.4 cm, evaluation is somewhat limited however there is no sonographic evidence for focal hepatic mass lesion on submitted imaging.    There is moderate gallbladder distention.  Internal echoes are consistent with artifact, there is no cholelithiasis on this exam.  There is no abnormal gallbladder wall thickening, the technologist indicates a positive sonographic Thorne's sign.    The common duct is mildly dilated measuring 6.6 mm on this exam.    The right kidney measures approximately 12.2 cm in length, there is no evidence for hydronephrosis.                                        CT Abdomen Pelvis  Without Contrast (Final result)  Result time 09/11/24 02:29:43      Final result by Devin Delagdo MD (09/11/24 02:29:43)                   Impression:      Inflammatory small bowel loops of the right abdomen are noted, and there are a few dilated small bowel loops, the findings may relate to inflammatory process of the small bowel, the possibility of superimposed developing obstruction or partial small bowel obstruction is to be considered.  Rotatory configuration of the mesentery is noted, this can be seen with internal hernia.    The common duct is prominent measuring up to 9.8 mm, there is no intrahepatic biliary dilatation, and no CT detectable choledocholithiasis, if clinically warranted ultrasound may be helpful for further evaluation.    Moderate gallbladder distention, there is no evidence for pericholecystic inflammatory  change.    Bilateral perinephric stranding without evidence for ureteral calculus or obstructive uropathy, may relate to a baseline appearance however correlation for renal disease to include UTI/pyelonephritis is needed.    Additional findings as above.    This report was flagged in Epic as abnormal.      Electronically signed by: Devin Delgado  Date:    09/11/2024  Time:    02:29               Narrative:    EXAMINATION:  CT ABDOMEN PELVIS WITHOUT CONTRAST    CLINICAL HISTORY:  Abdominal abscess/infection suspected;    TECHNIQUE:  Low dose axial images, sagittal and coronal reformations were obtained from the lung bases to the pubic symphysis.  Intravenous contrast and oral contrast was not utilized.    COMPARISON:  None    FINDINGS:  The visualized lung bases demonstrate mild motion artifact, mild atelectatic change.  The stomach is predominantly decompressed, there is a small fat density finding at the level the pylorus, this may relate to a small lipoma.    There is moderate gallbladder distention, there is no evidence for pericholecystic inflammatory change.  The common duct is prominent measuring up to 9.8 mm at the hepatic hilum, there is no abnormal intrahepatic biliary dilatation seen.  There is no abnormal pancreatic ductal dilatation and no peripancreatic inflammatory change, no CT detectable choledocholithiasis.    There is no evidence for acute process of the liver, spleen or adrenal glands.  Splenic calcifications are seen likely granulomatous.    There is no evidence for ureteral calculus or obstructive uropathy bilaterally.  Perinephric stranding is seen bilaterally, this may relate to a baseline appearance however correlation for renal disease to include UTI/pyelonephritis is needed.  There is mild atrophic change of the left kidney noted.  The abdominal aorta demonstrates atherosclerotic change, appears normal in caliber otherwise not optimally evaluated on this noncontrast examination.  Small  periaortic retroperitoneal lymph nodes are noted.  The urinary bladder appears unremarkable for degree of distention.  Small fat-density inguinal hernias are noted without bowel involvement.    There are a few dilated small bowel loops of the abdomen noted.  In addition there are a few small bowel loops of the right abdomen as seen on axial images 85 through 110 that demonstrate inflammatory appearance, several of these are not dilated, however there are adjacent dilated small bowel loops.  This may relate to an inflammatory process of the small bowel, may relate to enteritis, there may be a component of developing or partial small bowel obstructive change.  There is appearance of rotatory configuration of the mesentery near this level, this can be seen with internal hernia.    The appendix is identified, it does not appear inflamed.  There is no evidence for inflammatory or obstructive process of the colon.  There is no evidence for free intraperitoneal air.  There is no evidence for pneumatosis, there is no evidence for portal venous air and there is no evidence for mesenteric venous air.    The osseous structures demonstrate chronic change.                                       X-Ray Chest AP Portable (Final result)  Result time 09/11/24 00:42:35      Final result by Rocío Pretty MD (09/11/24 00:42:35)                   Impression:      No acute abnormality.    Stable ovoid sclerotic focus over the anterior right 5th rib.      Electronically signed by: Rocío Pretty  Date:    09/11/2024  Time:    00:42               Narrative:    EXAMINATION:  XR CHEST AP PORTABLE    CLINICAL HISTORY:  Unspecified abdominal pain    TECHNIQUE:  Single frontal view of the chest was performed.    COMPARISON:  Chest x-ray 04/24/2023, 04/02/2023, 07/22/2015    FINDINGS:  Stable sclerotic focus over the anterior right 5th rib is unchanged back to 7/20 2/15.  The lungs appear clear.  There is no evidence of pleural effusion or  pneumothorax.  Osseous structures otherwise appear intact.                                       Medications   famotidine (PF) injection 20 mg (20 mg Intravenous Given 9/11/24 0018)   ondansetron injection 4 mg (4 mg Intravenous Given 9/11/24 0018)   lactated ringers bolus 500 mL (0 mLs Intravenous Stopped 9/11/24 0116)   piperacillin-tazobactam 4.5 g in dextrose 5 % 100 mL IVPB (ready to mix) (0 g Intravenous Stopped 9/11/24 0319)     Medical Decision Making  64-year-old man nausea vomiting epigastric pain diarrhea since this evening heart rate 101, temp a 100°, systolic blood pressure in the 130s, satting well on room air he has a soft abdomen without signs of nicole peritonitis, differential includes pancreatitis gastritis PUD, ACS, metabolic or endocrine derangement, with his age diabetes we will obtain CT scan, his POC creatinine is 2.1 we will scan him non-con we will treat him symptomatically we will give him a small fluid bolus as well, we will also evaluate for atypical ACS.       CT findings with enteritis versus partial small bowel, possible rotational mesentery, CBD is elevated without evidence of choledocho lithiasis, with these findings in his leukocytosis I elected to cover him with antibiotics, elected to obtain ultrasound, no stone or wall thickening but does have positive sonographic Thorne's on their ultrasound, on my repeat exam he says he feels better his abdomen is soft and nontender to palpation including his right upper quadrant, I discussed with hospital medicine for admission for his STERLING, I also discussed with Dr. Pizarro the general surgeon on-call, in light of this mixed clinical picture he said it is reasonable to treat him as an enteritis, does not need an NG tube, can p.o. challenge him.  Monitor him in the hospital.    Amount and/or Complexity of Data Reviewed  External Data Reviewed: notes.  Labs: ordered. Decision-making details documented in ED Course.  Radiology: ordered and  independent interpretation performed. Decision-making details documented in ED Course.  ECG/medicine tests: ordered and independent interpretation performed. Decision-making details documented in ED Course.    Risk  Prescription drug management.  Decision regarding hospitalization.                                      Clinical Impression:  Final diagnoses:  [R10.9] Abdominal pain  [N17.9] STERLING (acute kidney injury) (Primary)  [K52.9] Enteritis  [D72.829] Leukocytosis, unspecified type          ED Disposition Condition    Admit Stable                Jan Richey MD  09/11/24 5741

## 2024-09-11 NOTE — PLAN OF CARE
09/11/24 1257   Final Note   Assessment Type Final Discharge Note   Anticipated Discharge Disposition Left Against

## 2024-09-11 NOTE — PLAN OF CARE
"[facility] - Emergency Dept  Initial Discharge Assessment       Primary Care Provider: Kori Clark FNP    Admission Diagnosis: STERLING (acute kidney injury) [N17.9]    Admission Date: 9/10/2024  Expected Discharge Date:      met with Pt at bedside to complete an initial discharge assessment. Pt AAOx4s.  Demographics, PCP, and insurance verified. Pt has No dialysis. Pt reports inability to complete ADLs without assistance. Pt uses a walker. Pt verbalized plan to discharge home via family transport. Pt has no other needs to be addressed at this time.          Payor: MEDICARE / Plan: MEDICARE PART A & B / Product Type: Government /     Extended Emergency Contact Information  Primary Emergency Contact: Sarah Gill  Mobile Phone: [phone]  Relation: Mother    Discharge Plan A: (P) Home  Discharge Plan B: (P) Home      [id number] - [place], [place] - [address] AT [facility] [address]  [address]  [facility] [address]  Phone: [phone] Fax: [phone]      Initial Assessment (most recent)       Adult Discharge Assessment - 09/11/24 1000          Discharge Assessment    Assessment Type Discharge Planning Assessment     Confirmed/corrected address, phone number and insurance Yes (P)      Confirmed Demographics -- (P)    SW added address to pt's record (confidential address)    Source of Information patient (P)      When was your last doctors appointment? -- (P)    "It's been a while"    Does patient/caregiver understand observation status Yes (P)      Reason For Admission STERLING (P)      People in Home alone (P)      Facility Arrived From: Home (P)      Do you expect to return to your current living situation? Yes (P)      Do you have help at home or someone to help you manage your care at home? Yes (P)      Who are your caregiver(s) and their phone number(s)? BridgetSarah (Mother)  [phone] (Mobile) (P)      Prior to hospitilization cognitive " status: Unable to Assess (P)      Current cognitive status: Alert/Oriented (P)      Walking or Climbing Stairs Difficulty yes (P)      Walking or Climbing Stairs ambulation difficulty, requires equipment;stair climbing difficulty, requires equipment (P)      Dressing/Bathing Difficulty no (P)      Home Accessibility wheelchair accessible (P)      Home Layout Able to live on 1st floor (P)      Equipment Currently Used at Home walker, rolling (P)      Readmission within 30 days? No (P)      Patient currently being followed by outpatient case management? No (P)      Do you currently have service(s) that help you manage your care at home? No (P)      Do you take prescription medications? Yes (P)      Do you have prescription coverage? Yes (P)      Coverage MEDICARE - MEDICARE PART A & B - (P)      Do you have any problems affording any of your prescribed medications? No (P)      Is the patient taking medications as prescribed? yes (P)      Who is going to help you get home at discharge? Sarah Gill (Mother)  [phone] (Mobile) (P)      How do you get to doctors appointments? car, drives self (P)      Are you on dialysis? No (P)      Do you take coumadin? No (P)      Discharge Plan A Home (P)      Discharge Plan B Home (P)      DME Needed Upon Discharge  none (P)      Discharge Plan discussed with: Patient (P)         Physical Activity    On average, how many days per week do you engage in moderate to strenuous exercise (like a brisk walk)? 0 days (P)      On average, how many minutes do you engage in exercise at this level? 0 min (P)         Financial Resource Strain    How hard is it for you to pay for the very basics like food, housing, medical care, and heating? Not hard at all (P)         Housing Stability    In the last 12 months, was there a time when you were not able to pay the mortgage or rent on time? No (P)      At any time in the past 12 months, were you homeless or living in a shelter (including  now)? No (P)         Transportation Needs    Has the lack of transportation kept you from medical appointments, meetings, work or from getting things needed for daily living? No (P)         Food Insecurity    Within the past 12 months, you worried that your food would run out before you got the money to buy more. Never true (P)      Within the past 12 months, the food you bought just didn't last and you didn't have money to get more. Never true (P)         Stress    Do you feel stress - tense, restless, nervous, or anxious, or unable to sleep at night because your mind is troubled all the time - these days? Not at all (P)         Social Isolation    How often do you feel lonely or isolated from those around you?  Never (P)         Alcohol Use    Q1: How often do you have a drink containing alcohol? Never (P)      Q2: How many drinks containing alcohol do you have on a typical day when you are drinking? Patient does not drink (P)      Q3: How often do you have six or more drinks on one occasion? Never (P)         Utilities    In the past 12 months has the electric, gas, oil, or water company threatened to shut off services in your home? No (P)         Health Literacy    How often do you need to have someone help you when you read instructions, pamphlets, or other written material from your doctor or pharmacy? Never (P)

## 2024-09-11 NOTE — H&P
"   [facility]         HISTORY & PHYSICAL   HOSPITALIST ADMISSION NOTE    Admitting MD: Ulises Calzada MD             Patient Name:Juan Pablo Chang  Patient :1960   Patient Age/Gender: 64 y.o. male  MRN:[id number]  CSN:[id number]  Status: OP- Observation  Admission Date:9/10/2024  Date of Service:2024               .HISTORY OF PRESENT ILLNESS   Chief Complaint:   Chief Complaint   Patient presents with    Vomiting    Nausea     Symptoms began approximately three hours ago, nausea, vomiting,diarrhea, and weakness.  Patient also complains of abdominal pain.    Diarrhea        Principal Problem:   <principal problem not specified>    Patient Information Source(s):      patient and ER records.    HPI:  Juan Pablo Chang is a 64 y.o. male who has a history of  hypertension, diabetes, neuropathy  and presents with abdominal pain    Patient states that he has had a bad cold and he has been coughing feels generally sick, vomiting having diarrhea.  He states that he vomited 5 or 6 times yesterday.  Nonbloody nonbilious.  Denies blood in the stool.  Pain is worse in the epigastric region.  Patient denies any fevers, did have some chills.  No chest pain no shortness a breath.  No dysuria.  In the ER patient has a mild biliary dilatation and common bile duct is 6.6 mm, via ultrasound.  Patient also has some perinephric stranding concerning for UTI.  No sign of stones.  CT shows signs of enteritis versus a partial small bowel obstruction and mesenteric rotation.  Surgery was consulted.    No notes on file    Patient states that his brother  yesterday here in the hospital states that he  "on the operating to"John                 .REVIEW OF SYSTEMS.   Review of Systems    All other ROS other than above negative           . MEDICAL HISTORY.   PCP: Kori Clark FNP  Specialists:    Past Medical History    Past Medical History:   Diagnosis Date    Diabetes mellitus     Diabetes " mellitus, type 2     Hypertension     Neuropathy        Past Surgical History     has a past surgical history that includes Foot amputation through metatarsal (Right, 4/2/2023).  Past Surgical History:   Procedure Laterality Date    FOOT AMPUTATION THROUGH METATARSAL Right 4/2/2023    Procedure: AMPUTATION, FOOT, TRANSMETATARSAL;  Surgeon: Orion Donaldson DPM;  Location: [facility] OR;  Service: Podiatry;  Laterality: Right;         Social  History     reports that he has never smoked. He uses smokeless tobacco. He reports that he does not currently use alcohol. He reports that he does not use drugs.  Tobacco Use    Smoking status: Never    Smokeless tobacco: Current   Substance and Sexual Activity    Alcohol use: Not Currently    Drug use: Never    Sexual activity: Not on file     Patient used to smoke 2 packs a day quit 1980    Family History    family history is not on file.       Diabetes    Denies any history of gallbladder disease in family.  Family History    None                    . ALLERGIES.     Review of patient's allergies indicates:  No Known Allergies           . MEDICATIONS.   Home Medications:       No current facility-administered medications on file prior to encounter.     Current Outpatient Medications on File Prior to Encounter   Medication Sig    furosemide (LASIX) 20 MG tablet Take 1 tablet (20 mg total) by mouth once daily.    gabapentin (NEURONTIN) 800 MG tablet Take 800 mg by mouth 3 (three) times daily.    glipiZIDE (GLUCOTROL) 10 MG tablet Take 10 mg by mouth 2 (two) times daily before meals.    insulin detemir U-100, Levemir, (LEVEMIR FLEXPEN) 100 unit/mL (3 mL) InPn pen inject 20 unit by subcutaneous route  every evening    lisinopriL (PRINIVIL,ZESTRIL) 30 MG tablet Take 15 mg by mouth.    metFORMIN (GLUCOPHAGE) 1000 MG tablet TAKE 1 TABLET BY MOUTH TWICE DAILY WITH THE MORNING AND EVENING MEAL                  . PHYSICAL EXAM.         Vital Signs (24h Range): Vital Signs (Most Recent):    Temp:  [100 °F (37.8 °C)] 100 °F (37.8 °C)  Pulse:  [] 83  Resp:  [13-20] 16  SpO2:  [95 %-100 %] 100 %  BP: (101-159)/(53-72) 101/55 Temp: 100 °F (37.8 °C) (09/11/24 0002)  Pulse: 83 (09/11/24 0600)  Resp: 16 (09/11/24 0600)  BP: (!) 101/55 (09/11/24 0600)  SpO2: 100 % (09/11/24 0600)      Vitals:    09/11/24 0400 09/11/24 0430 09/11/24 0500 09/11/24 0600   BP: (!) 110/54 (!) 108/54 (!) 106/53 (!) 101/55   BP Location:       Pulse: 85 83 88 83   Resp: 13 13 13 16   Temp:       TempSrc:       SpO2: 96% 96% 96% 100%   Weight:       Height:               Weight: 65.8 kg (145 lb)  Body mass index is 22.05 kg/m².  Wt Readings from Last 3 Encounters:   09/11/24 65.8 kg (145 lb)   08/16/23 65.8 kg (145 lb)   07/17/23 66.7 kg (147 lb)         --    Physical Exam  Vitals reviewed.   Constitutional:       General: He is not in acute distress.     Appearance: He is not ill-appearing.   HENT:      Head: Normocephalic and atraumatic.   Eyes:      Pupils: Pupils are equal, round, and reactive to light.   Cardiovascular:      Rate and Rhythm: Normal rate.      Heart sounds: No murmur heard.  Pulmonary:      Effort: Pulmonary effort is normal. No respiratory distress.      Breath sounds: No wheezing, rhonchi or rales.   Abdominal:      General: There is no distension.      Palpations: Abdomen is soft.      Tenderness: There is abdominal tenderness (Epigastric).   Musculoskeletal:         General: Deformity (Distal Right foot partial tarsal amputation) present. No swelling or tenderness.   Neurological:      General: No focal deficit present.      Mental Status: He is alert and oriented to person, place, and time.      Cranial Nerves: No cranial nerve deficit.      Motor: No weakness.                     DIAGNOSTIC DATA   Summary    Laboratory Studies:    CBC BMP   Recent Labs   Lab 09/11/24 0012   WBC 17.10*   HGB 13.7*   HCT 41.7       Recent Labs   Lab 09/11/24 0012      K 5.2*      CO2 22*   BUN 32*  "  CREATININE 1.8*   CALCIUM 9.2           All pertinent labs within the past 24 hours have been reviewed.  .    Other Studies:          ------  Diagnostic Data Details          CBC  17.10 13.7 326    41.7     Coag                 BMP  138 107 32 127   5.2 22 1.8     CaMgPhos  9.2              Last labs from current encounter as of 09/11/2024-6:23 AM             Laboratory Studies:    Blood Gas:  No results for input(s): "PH", "PCO2", "PO2", "HCO3", "POCSATURATED", "BE" in the last 72 hours.      Recent Labs   Lab 09/11/24  0012   WBC 17.10*   HGB 13.7*   HCT 41.7      MONO 5.3  0.9   EOSINOPHIL 0.4     No results for input(s): "APTT", "INR", "PTT" in the last 168 hours.     Recent Labs   Lab 09/11/24  0012      K 5.2*      CO2 22*   BUN 32*   CREATININE 1.8*   CALCIUM 9.2   PROT 7.1   BILITOT 0.6   ALKPHOS 53*   ALT 9*   AST 15     No results for input(s): "MG" in the last 168 hours.     Recent Labs   Lab 09/11/24  0012   TROPONINIHS 8.3             Lab Results   Component Value Date    COLORU Yellow 09/11/2024    COLORU Yellow 04/21/2023    COLORU Yellow 03/31/2023    SPECGRAV 1.020 09/11/2024    SPECGRAV 1.025 04/21/2023    SPECGRAV 1.010 03/31/2023    PHUR 6.0 09/11/2024    PHUR 6.0 04/21/2023    PHUR 6.0 03/31/2023    NITRITE Negative 09/11/2024    NITRITE Negative 04/21/2023    NITRITE Negative 03/31/2023    KETONESU Trace (A) 09/11/2024    KETONESU 1+ (A) 04/21/2023    KETONESU Negative 03/31/2023    UROBILINOGEN Negative 09/11/2024    UROBILINOGEN Negative 04/21/2023    UROBILINOGEN 1.0 03/31/2023      No results for input(s): "TSH", "T3FREE", "FREET4" in the last 168 hours.      Lab Results   Component Value Date    HGBA1C 6.5 (H) 03/31/2023     The ASCVD Risk score (Amos HAMM, et al., 2019) failed to calculate for the following reasons:    Cannot find a previous HDL lab    Cannot find a previous total cholesterol lab           ECG:        RADIOLOGY STUDIES:     US Abdomen " Limited  Narrative: EXAMINATION:  US ABDOMEN LIMITED    CLINICAL HISTORY:  epigastric pain;    TECHNIQUE:  Limited ultrasound of the right upper quadrant of the abdomen (including pancreas, liver, gallbladder, common bile duct) was performed.    COMPARISON:  CT examination of the abdomen and pelvis September 11, 2024    FINDINGS:  The technologist indicates the examination is somewhat limited due to bowel gas shadowing and patient body habitus.    The pancreas is not well visualized and not well evaluated on this examination.  There is limited visualization of the aorta and IVC, visualized aspects appear unremarkable.    The submitted hepatic length measurement is 16.4 cm, evaluation is somewhat limited however there is no sonographic evidence for focal hepatic mass lesion on submitted imaging.    There is moderate gallbladder distention.  Internal echoes are consistent with artifact, there is no cholelithiasis on this exam.  There is no abnormal gallbladder wall thickening, the technologist indicates a positive sonographic Thorne's sign.    The common duct is mildly dilated measuring 6.6 mm on this exam.    The right kidney measures approximately 12.2 cm in length, there is no evidence for hydronephrosis.  Impression: There is moderate gallbladder distention, there is no sonographic evidence for cholelithiasis and no abnormal gallbladder wall thickening, however the technologist indicates a positive sonographic Thorne's sign, clinical and historical correlation is otherwise needed.    Mild biliary dilatation, the common duct measures approximately 6.6 mm.    Electronically signed by: Devin Delgado  Date:    09/11/2024  Time:    03:34  CT Abdomen Pelvis  Without Contrast  Narrative: EXAMINATION:  CT ABDOMEN PELVIS WITHOUT CONTRAST    CLINICAL HISTORY:  Abdominal abscess/infection suspected;    TECHNIQUE:  Low dose axial images, sagittal and coronal reformations were obtained from the lung bases to the pubic  symphysis.  Intravenous contrast and oral contrast was not utilized.    COMPARISON:  None    FINDINGS:  The visualized lung bases demonstrate mild motion artifact, mild atelectatic change.  The stomach is predominantly decompressed, there is a small fat density finding at the level the pylorus, this may relate to a small lipoma.    There is moderate gallbladder distention, there is no evidence for pericholecystic inflammatory change.  The common duct is prominent measuring up to 9.8 mm at the hepatic hilum, there is no abnormal intrahepatic biliary dilatation seen.  There is no abnormal pancreatic ductal dilatation and no peripancreatic inflammatory change, no CT detectable choledocholithiasis.    There is no evidence for acute process of the liver, spleen or adrenal glands.  Splenic calcifications are seen likely granulomatous.    There is no evidence for ureteral calculus or obstructive uropathy bilaterally.  Perinephric stranding is seen bilaterally, this may relate to a baseline appearance however correlation for renal disease to include UTI/pyelonephritis is needed.  There is mild atrophic change of the left kidney noted.  The abdominal aorta demonstrates atherosclerotic change, appears normal in caliber otherwise not optimally evaluated on this noncontrast examination.  Small periaortic retroperitoneal lymph nodes are noted.  The urinary bladder appears unremarkable for degree of distention.  Small fat-density inguinal hernias are noted without bowel involvement.    There are a few dilated small bowel loops of the abdomen noted.  In addition there are a few small bowel loops of the right abdomen as seen on axial images 85 through 110 that demonstrate inflammatory appearance, several of these are not dilated, however there are adjacent dilated small bowel loops.  This may relate to an inflammatory process of the small bowel, may relate to enteritis, there may be a component of developing or partial small bowel  obstructive change.  There is appearance of rotatory configuration of the mesentery near this level, this can be seen with internal hernia.    The appendix is identified, it does not appear inflamed.  There is no evidence for inflammatory or obstructive process of the colon.  There is no evidence for free intraperitoneal air.  There is no evidence for pneumatosis, there is no evidence for portal venous air and there is no evidence for mesenteric venous air.    The osseous structures demonstrate chronic change.  Impression: Inflammatory small bowel loops of the right abdomen are noted, and there are a few dilated small bowel loops, the findings may relate to inflammatory process of the small bowel, the possibility of superimposed developing obstruction or partial small bowel obstruction is to be considered.  Rotatory configuration of the mesentery is noted, this can be seen with internal hernia.    The common duct is prominent measuring up to 9.8 mm, there is no intrahepatic biliary dilatation, and no CT detectable choledocholithiasis, if clinically warranted ultrasound may be helpful for further evaluation.    Moderate gallbladder distention, there is no evidence for pericholecystic inflammatory change.    Bilateral perinephric stranding without evidence for ureteral calculus or obstructive uropathy, may relate to a baseline appearance however correlation for renal disease to include UTI/pyelonephritis is needed.    Additional findings as above.    This report was flagged in Epic as abnormal.    Electronically signed by: Devin Delgado  Date:    09/11/2024  Time:    02:29  X-Ray Chest AP Portable  Narrative: EXAMINATION:  XR CHEST AP PORTABLE    CLINICAL HISTORY:  Unspecified abdominal pain    TECHNIQUE:  Single frontal view of the chest was performed.    COMPARISON:  Chest x-ray 04/24/2023, 04/02/2023, 07/22/2015    FINDINGS:  Stable sclerotic focus over the anterior right 5th rib is unchanged back to 7/20 2/15.   The lungs appear clear.  There is no evidence of pleural effusion or pneumothorax.  Osseous structures otherwise appear intact.  Impression: No acute abnormality.    Stable ovoid sclerotic focus over the anterior right 5th rib.    Electronically signed by: Rocío Pretty  Date:    09/11/2024  Time:    00:42          CARDIAC DIAGNOSTIC INFORMATION REVIEW:  Most Recent Echocardiogram:  Results for orders placed during the hospital encounter of 03/31/23    Echo    Interpretation Summary  · The left ventricle is normal in size with moderate concentric hypertrophy and normal systolic function.  · The estimated ejection fraction is 55%.  · Mild right ventricular enlargement.  · Mild left atrial enlargement.      Last Cardiac Cath  No results found for this or any previous visit.        Last Stress Test   No results found for this or any previous visit.          -------                     . ASSESSMENT & PLAN.   Patient Summary:       Patient Problem List:  There are no hospital problems to display for this patient.        Current Medications:       9/10/2024   Medications   piperacillin-tazobactam 4.5 g in dextrose 5 % 100 mL IVPB (ready to mix) (has no administration in time range)   famotidine (PF) injection 20 mg (20 mg Intravenous Given 9/11/24 0018)   ondansetron injection 4 mg (4 mg Intravenous Given 9/11/24 0018)   lactated ringers bolus 500 mL (0 mLs Intravenous Stopped 9/11/24 0116)   piperacillin-tazobactam 4.5 g in dextrose 5 % 100 mL IVPB (ready to mix) (0 g Intravenous Stopped 9/11/24 0319)               Assessment & Plan:    No notes have been filed under this hospital service.  Service: Hospital Medicine         Sepsis Patient has soft blood pressures we will avoid any antihypertensive medication, we will continue with IV fluids, we will also continue with antibiotics, patient has multiple sites of potential infection biliary versus renal versus intestinal.  We will continue to monitor patient and follow  "up with surgical impression as well.    Epigastric pain Patient could have cholecystitis, there is no sign of stone in the bile ducts although there is a slightly dilated CBD,  we will continue on antibiotics and follow up with surgery.  Surgery okay if patient attempts to eat food does not need to be NPO    STERLING Patient has slight STERLING with a creatinine at 2.1 and also possible pyelonephritis versus UTI we will also continue with antibiotics as stated above follow up on cultures continue IV fluids    Diabetes Patient has diabetes we will continue sliding scale    Hypertension Patient has hypertension but will hold medication        EDITABLEPROBLIST     SUBJOBJ    VTE PPX:    VTE Risk Mitigation (From admission, onward)      None          Fluids:    Nutrition:   Code Status:   Code Status: Prior   Admit Status: OP- Observation    Admitting MD:  Ulises Calzada MD -  Department of Hospital Medicine  - [facility]  ED MD:   Consultants:   Treatment Teams This Admission:  PCP: Kori Clark FNP    PATHWAYS ORDERED      [] COPD       [] DKA      [] CHF      [] CVA Ischemic      [] GI Bleed      [] NSTEMI      [] NONE                 Electronically Signed By: Ulises Calzada 9/11/2024 6:23 AM  September 11, 2024     Portions of this chart may have been created with Dragon Voice Recognition Software . Occasional wrong-word or "sound-alike" substitutions may have occurred due to the inherent limitations of voice recognition software. Please read the chart carefully and recognize, using context, where these substitutions have occurred.               "

## 2024-09-11 NOTE — PLAN OF CARE
Pt was explained BATRES. Pt verbalized understanding of BATRES and signed. BATRES scanned to .       09/11/24 0858   BATRES Message   Medicare Outpatient and Observation Notification regarding financial responsibility Explained to patient/caregiver;Signed/date by patient/caregiver   Date BATRES was signed 09/11/24   Time BATRES was signed 0858

## 2024-09-11 NOTE — PLAN OF CARE
Inpatient Upgrade Note     Juan Pablo Chang has warranted treatment spanning two or more midnights of hospital level care for the management of  STERLING, ABDOMINAL PAIN, SEPSIS . He continues to require IV antibiotics, IV fluids, daily labs, further testing/imaging, monitoring of vital signs, and further evaluation by consultants. His condition is also complicated by the following comorbidities: Hypertension, Diabetes, and NEUROPATHY

## 2024-09-11 NOTE — PHARMACY MED REC
"Admission Medication History     The home medication history was taken by Shaun Vazquez.    You may go to "Admission" then "Reconcile Home Medications" tabs to review and/or act upon these items.     The home medication list has been updated by the Pharmacy department.   Please read ALL comments highlighted in yellow.   Please address this information as you see fit.    Feel free to contact us if you have any questions or require assistance.      The medications listed below were removed from the home medication list. Please reorder if appropriate:  Patient reports no longer taking the following medication(s):  Furosemide 20 mg    Medications listed below were obtained from: Patient/family and Analytic software- [employer]  No current facility-administered medications on file prior to encounter.     Current Outpatient Medications on File Prior to Encounter   Medication Sig Dispense Refill    gabapentin (NEURONTIN) 800 MG tablet Take 800 mg by mouth 3 (three) times daily.      glipiZIDE (GLUCOTROL) 10 MG tablet Take 10 mg by mouth 2 (two) times daily before meals.      lisinopriL (PRINIVIL,ZESTRIL) 40 MG tablet Take 40 mg by mouth once daily.      metFORMIN (GLUCOPHAGE) 1000 MG tablet Take 1,000 mg by mouth 2 (two) times daily with meals.      TOUJEO SOLOSTAR U-300 INSULIN 300 unit/mL (1.5 mL) InPn pen Inject 20 Units into the skin every morning.      insulin detemir U-100, Levemir, (LEVEMIR FLEXPEN) 100 unit/mL (3 mL) InPn pen inject 20 unit by subcutaneous route  every evening (Patient not taking: Reported on 9/11/2024)      [DISCONTINUED] furosemide (LASIX) 20 MG tablet Take 1 tablet (20 mg total) by mouth once daily. 30 tablet 2    [DISCONTINUED] lisinopriL (PRINIVIL,ZESTRIL) 30 MG tablet Take 15 mg by mouth.         Shaun Vazquez  EXT 1924                .          "

## 2024-09-11 NOTE — HOSPITAL COURSE
Patient admitted for abdominal pain and nausea and vomiting with STERLING.  Seen by General surgery and agreed with possible enteritis and recommended IV fluids and advancing diet.  As I want to see patient he was complaining that he feels fine and he needs to leave.  I advised him he still has STERLING and all my labs are not back yet.  Since he is alert and oriented x3 he is able to leave but it would be against medical advice as I can not say that I have a safe discharge plan and that he may get more sick and possibly die.  Patient said I am leaving and signed AMA paperwork.

## 2024-09-11 NOTE — DISCHARGE SUMMARY
"[facility] - Emergency Dept  Hospital Medicine  Discharge Summary      Patient Name: Juan Pablo Chang  MRN: [id number]  JULIÁN: [id number]  Patient Class: OP- Observation  Admission Date: 9/10/2024  Hospital Length of Stay: 0 days  Discharge Date and Time:  09/11/2024 1:51 PM  Attending Physician: No att. providers found   Discharging Provider: Jorge Gutiérrez DO  Primary Care Provider: Kori Clark FNP    Primary Care Team: Networked reference to record PCT     HPI:   No notes on file    * No surgery found *      Hospital Course:   Patient admitted for abdominal pain and nausea and vomiting with STERLING.  Seen by General surgery and agreed with possible enteritis and recommended IV fluids and advancing diet.  As I want to see patient he was complaining that he feels fine and he needs to leave.  I advised him he still has STERLING and all my labs are not back yet.  Since he is alert and oriented x3 he is able to leave but it would be against medical advice as I can not say that I have a safe discharge plan and that he may get more sick and possibly die.  Patient said I am leaving and signed AMA paperwork.     Goals of Care Treatment Preferences:  Code Status: Full Code      SDOH Screening:  The patient was screened for utility difficulties, food insecurity, transport difficulties, housing insecurity, and interpersonal safety and there were no concerns identified this admission.     Consults:   Consults (From admission, onward)          Status Ordering Provider     Pharmacy to dose Vancomycin consult  Once        Provider:  (Not yet assigned)   Placed in "And" Linked Group    Ordered MOISE VALENCIA     Inpatient consult to General surgery  Once        Provider:  Layton Pizarro III, MD Acknowledged CRIMMINS, IAN MICHAEL            No new Assessment & Plan notes have been filed under this hospital service since the last note was generated.  Service: Hospital Medicine    Final Active Diagnoses:    " Diagnosis Date Noted POA    PRINCIPAL PROBLEM:  Lower abdominal pain [R10.30] 09/11/2024 Yes    Gastroenteritis [K52.9] 09/11/2024 Yes      Problems Resolved During this Admission:       Discharged Condition: against medical advice    Disposition: Left Against Medical Adv*    Follow Up:    Patient Instructions:   No discharge procedures on file.    Significant Diagnostic Studies: N/A    Pending Diagnostic Studies:       None           Medications:  None    Indwelling Lines/Drains at time of discharge:   Lines/Drains/Airways       None                   Time spent on the discharge of patient: 33 minutes         Jorge Gutiérrez DO  Department of Hospital Medicine  [facility] - Emergency Dept

## 2025-01-08 ENCOUNTER — OFFICE VISIT (OUTPATIENT)
Dept: PODIATRY | Facility: CLINIC | Age: 65
End: 2025-01-08
Payer: MEDICARE

## 2025-01-08 VITALS
HEIGHT: 68 IN | BODY MASS INDEX: 21.98 KG/M2 | DIASTOLIC BLOOD PRESSURE: 90 MMHG | HEART RATE: 80 BPM | SYSTOLIC BLOOD PRESSURE: 170 MMHG | WEIGHT: 145 LBS

## 2025-01-08 DIAGNOSIS — S98.919A AMPUTATION OF FOOT: ICD-10-CM

## 2025-01-08 DIAGNOSIS — E11.9 COMPREHENSIVE DIABETIC FOOT EXAMINATION, TYPE 2 DM, ENCOUNTER FOR: ICD-10-CM

## 2025-01-08 DIAGNOSIS — E11.649 TYPE 2 DIABETES MELLITUS WITH HYPOGLYCEMIA WITHOUT COMA, WITHOUT LONG-TERM CURRENT USE OF INSULIN: Primary | ICD-10-CM

## 2025-01-08 PROBLEM — L97.512 ULCER OF RIGHT FOOT WITH FAT LAYER EXPOSED: Status: RESOLVED | Noted: 2023-07-18 | Resolved: 2025-01-08

## 2025-01-08 PROBLEM — M86.371 CHRONIC MULTIFOCAL OSTEOMYELITIS OF RIGHT FOOT: Status: RESOLVED | Noted: 2023-04-02 | Resolved: 2025-01-08

## 2025-01-08 PROBLEM — L02.611 CELLULITIS AND ABSCESS OF TOE OF RIGHT FOOT: Status: RESOLVED | Noted: 2023-04-01 | Resolved: 2025-01-08

## 2025-01-08 PROBLEM — L03.115 CELLULITIS OF RIGHT LOWER EXTREMITY: Status: RESOLVED | Noted: 2023-04-02 | Resolved: 2025-01-08

## 2025-01-08 PROBLEM — L03.031 CELLULITIS AND ABSCESS OF TOE OF RIGHT FOOT: Status: RESOLVED | Noted: 2023-04-01 | Resolved: 2025-01-08

## 2025-01-08 PROCEDURE — 99214 OFFICE O/P EST MOD 30 MIN: CPT | Mod: S$PBB,,, | Performed by: PODIATRIST

## 2025-01-08 PROCEDURE — 99213 OFFICE O/P EST LOW 20 MIN: CPT | Mod: PBBFAC | Performed by: PODIATRIST

## 2025-01-08 PROCEDURE — 99999 PR PBB SHADOW E&M-EST. PATIENT-LVL III: CPT | Mod: PBBFAC,,, | Performed by: PODIATRIST

## 2025-01-08 RX ORDER — PREGABALIN 200 MG/1
200 CAPSULE ORAL 3 TIMES DAILY
Qty: 90 CAPSULE | Refills: 5 | Status: SHIPPED | OUTPATIENT
Start: 2025-01-08 | End: 2025-07-09

## 2025-01-08 RX ORDER — LISINOPRIL 30 MG/1
30 TABLET ORAL
COMMUNITY
Start: 2025-01-02

## 2025-01-11 NOTE — PROGRESS NOTES
Subjective     Patient ID: Juan Pablo Chang is a 64 y.o. male.    Chief Complaint: Foot Pain (Bilateral), Foot Swelling (Right), and Ankle Pain (Right ankle)  Patient presents for follow-up diabetic evaluation.  HPI  Review of Systems   Neurological:  Positive for numbness.          Objective     Physical Exam  Vitals and nursing note reviewed.   Constitutional:       Appearance: Normal appearance.   Cardiovascular:      Pulses:           Dorsalis pedis pulses are 1+ on the right side and 1+ on the left side.        Posterior tibial pulses are 1+ on the right side and 1+ on the left side.   Pulmonary:      Effort: Pulmonary effort is normal.   Musculoskeletal:         General: Deformity present.      Right foot: Decreased range of motion. Deformity present.        Feet:    Feet:      Right foot:      Protective Sensation: 5 sites tested.   1 site sensed.     Skin integrity: Ulcer, skin breakdown and erythema present.      Left foot:      Protective Sensation: 5 sites tested.   1 site sensed.  Skin:     Capillary Refill: Capillary refill takes 2 to 3 seconds.      Findings: Erythema present.   Neurological:      Mental Status: He is alert.      Sensory: Sensory deficit present.   Psychiatric:         Mood and Affect: Mood normal.         Behavior: Behavior normal.                                            Assessment and Plan     1. Type 2 diabetes mellitus with hypoglycemia without coma, without long-term current use of insulin  -     DIABETIC SHOES FOR HOME USE  -     pregabalin (LYRICA) 200 MG Cap; Take 1 capsule (200 mg total) by mouth 3 (three) times daily.  Dispense: 90 capsule; Refill: 5    2. Comprehensive diabetic foot examination, type 2 DM, encounter for    3. Amputation of foot  -     DIABETIC SHOES FOR HOME USE        Patient presents today follow-up diabetic evaluation.  Patient previously had a transmetatarsal amputation of the right foot he states this has been doing okay he has had some issues with  balance.  Patient states he continues to take gabapentin 800 mg 3 times a day he states it has been very limited as to how much it helps his neuropathy.  Following a lengthy discussion with the patient I did recommend changing the patient to Lyrica 200 mg 3 times a day I have also recommended diabetic shoes for the patient with a toe filler for the right foot patient was in understanding and agreement with this he is having some anterior tibial tendinitis likely because of the way he is trying to compensate and walk I do feel he would greatly benefit from the diabetic shoes especially with a history of amputation as well as diabetic related neuropathy this does make him eligible for diabetic shoes.  Patient's neuropathy has worsened as he relates.  Comprehensive diabetic evaluation performed patient has no skin breaks no signs of infection noted but display significant diabetic related neuropathy bilateral.  Patient advised if he is not doing better on the Lyrica if he is having any issues with the diabetic shoe certainly to contact us for re-evaluation.  This note was created using [employer] voice recognition software that occasionally misinterpreted phrases or words.          No follow-ups on file.

## (undated) DEVICE — TRAY VAG PREP

## (undated) DEVICE — PAD ABD 8X10 STERILE

## (undated) DEVICE — SHEARS HARMONIC CRVD 9 CM

## (undated) DEVICE — DRAPE STERI INSTRUMENT 1018

## (undated) DEVICE — KIT COLLECTION E SWAB REGULAR

## (undated) DEVICE — GLOVE BIOGEL PI ORTHO PRO 7.5

## (undated) DEVICE — GLOVE SURGEONS ULTRA TOUCH 6.5

## (undated) DEVICE — BANDAGE ESMARK ELASTIC ST 4X9

## (undated) DEVICE — SUT BONE WAX 2.5 GRMS 12/BX

## (undated) DEVICE — SUT ETHILON 2 BLK MONO TP-1

## (undated) DEVICE — Device

## (undated) DEVICE — INTERPULSE SET

## (undated) DEVICE — UNDERGLOVES BIOGEL PI SZ 7 LF

## (undated) DEVICE — SUT ETHILON 2-0 FSLX 30 BLK

## (undated) DEVICE — SYR B-D REG DETCH 1INX21GA10ML

## (undated) DEVICE — TOURNIQUET SB QC SP 18X4IN

## (undated) DEVICE — SPONGE LAP 18X18 PREWASHED

## (undated) DEVICE — HEMOSTAT SURGICEL PWD 3G

## (undated) DEVICE — PACK ELITE MINIVIEW DRAPE

## (undated) DEVICE — PAD ELECTROSURGICAL SPL W/CORD

## (undated) DEVICE — UNDERGLOVES BIOGEL PI SIZE 7.5

## (undated) DEVICE — GAUZE SPONGE 4X4 12PLY

## (undated) DEVICE — BANDAGE ROLL COTTN 4.5INX4.1YD

## (undated) DEVICE — LABEL FOR UTILITY MARKER

## (undated) DEVICE — DRAPE T EXTRM SURG 121X128X90

## (undated) DEVICE — DRESSING N ADH OIL EMUL 3X3